# Patient Record
Sex: FEMALE | Race: WHITE | NOT HISPANIC OR LATINO | URBAN - METROPOLITAN AREA
[De-identification: names, ages, dates, MRNs, and addresses within clinical notes are randomized per-mention and may not be internally consistent; named-entity substitution may affect disease eponyms.]

---

## 2019-01-01 ENCOUNTER — EMERGENCY (EMERGENCY)
Facility: HOSPITAL | Age: 74
LOS: 1 days | Discharge: ANOTHER HOSPITAL | End: 2019-01-01
Attending: EMERGENCY MEDICINE | Admitting: EMERGENCY MEDICINE
Payer: MEDICARE

## 2019-01-01 ENCOUNTER — INPATIENT (INPATIENT)
Facility: HOSPITAL | Age: 74
LOS: 9 days | DRG: 85 | End: 2019-10-06
Attending: INTERNAL MEDICINE | Admitting: INTERNAL MEDICINE
Payer: MEDICARE

## 2019-01-01 VITALS
SYSTOLIC BLOOD PRESSURE: 135 MMHG | HEIGHT: 63 IN | DIASTOLIC BLOOD PRESSURE: 91 MMHG | WEIGHT: 115.08 LBS | OXYGEN SATURATION: 95 % | HEART RATE: 102 BPM | RESPIRATION RATE: 20 BRPM

## 2019-01-01 VITALS
HEART RATE: 101 BPM | WEIGHT: 115.08 LBS | DIASTOLIC BLOOD PRESSURE: 56 MMHG | RESPIRATION RATE: 14 BRPM | OXYGEN SATURATION: 92 % | SYSTOLIC BLOOD PRESSURE: 116 MMHG | TEMPERATURE: 98 F

## 2019-01-01 VITALS
RESPIRATION RATE: 18 BRPM | HEART RATE: 103 BPM | DIASTOLIC BLOOD PRESSURE: 54 MMHG | TEMPERATURE: 98 F | OXYGEN SATURATION: 94 % | SYSTOLIC BLOOD PRESSURE: 119 MMHG

## 2019-01-01 VITALS
HEART RATE: 133 BPM | TEMPERATURE: 102 F | SYSTOLIC BLOOD PRESSURE: 79 MMHG | OXYGEN SATURATION: 85 % | RESPIRATION RATE: 26 BRPM | DIASTOLIC BLOOD PRESSURE: 45 MMHG

## 2019-01-01 DIAGNOSIS — S06.5X9A TRAUMATIC SUBDURAL HEMORRHAGE WITH LOSS OF CONSCIOUSNESS OF UNSPECIFIED DURATION, INITIAL ENCOUNTER: ICD-10-CM

## 2019-01-01 DIAGNOSIS — Z71.89 OTHER SPECIFIED COUNSELING: ICD-10-CM

## 2019-01-01 DIAGNOSIS — J96.01 ACUTE RESPIRATORY FAILURE WITH HYPOXIA: ICD-10-CM

## 2019-01-01 DIAGNOSIS — E88.3 TUMOR LYSIS SYNDROME: ICD-10-CM

## 2019-01-01 DIAGNOSIS — R55 SYNCOPE AND COLLAPSE: ICD-10-CM

## 2019-01-01 DIAGNOSIS — Z51.5 ENCOUNTER FOR PALLIATIVE CARE: ICD-10-CM

## 2019-01-01 DIAGNOSIS — A41.9 SEPSIS, UNSPECIFIED ORGANISM: ICD-10-CM

## 2019-01-01 DIAGNOSIS — N17.9 ACUTE KIDNEY FAILURE, UNSPECIFIED: ICD-10-CM

## 2019-01-01 DIAGNOSIS — R06.00 DYSPNEA, UNSPECIFIED: ICD-10-CM

## 2019-01-01 DIAGNOSIS — R45.1 RESTLESSNESS AND AGITATION: ICD-10-CM

## 2019-01-01 DIAGNOSIS — E87.2 ACIDOSIS: ICD-10-CM

## 2019-01-01 DIAGNOSIS — E83.51 HYPOCALCEMIA: ICD-10-CM

## 2019-01-01 DIAGNOSIS — E87.8 OTHER DISORDERS OF ELECTROLYTE AND FLUID BALANCE, NOT ELSEWHERE CLASSIFIED: ICD-10-CM

## 2019-01-01 DIAGNOSIS — Z29.9 ENCOUNTER FOR PROPHYLACTIC MEASURES, UNSPECIFIED: ICD-10-CM

## 2019-01-01 DIAGNOSIS — D46.9 MYELODYSPLASTIC SYNDROME, UNSPECIFIED: ICD-10-CM

## 2019-01-01 DIAGNOSIS — D72.829 ELEVATED WHITE BLOOD CELL COUNT, UNSPECIFIED: ICD-10-CM

## 2019-01-01 DIAGNOSIS — E83.39 OTHER DISORDERS OF PHOSPHORUS METABOLISM: ICD-10-CM

## 2019-01-01 LAB
-  AMIKACIN: SIGNIFICANT CHANGE UP
-  AMPICILLIN/SULBACTAM: SIGNIFICANT CHANGE UP
-  AMPICILLIN: SIGNIFICANT CHANGE UP
-  AZTREONAM: SIGNIFICANT CHANGE UP
-  CEFAZOLIN: SIGNIFICANT CHANGE UP
-  CEFEPIME: SIGNIFICANT CHANGE UP
-  CEFOXITIN: SIGNIFICANT CHANGE UP
-  CEFTRIAXONE: SIGNIFICANT CHANGE UP
-  CIPROFLOXACIN: SIGNIFICANT CHANGE UP
-  GENTAMICIN: SIGNIFICANT CHANGE UP
-  IMIPENEM: SIGNIFICANT CHANGE UP
-  LEVOFLOXACIN: SIGNIFICANT CHANGE UP
-  MEROPENEM: SIGNIFICANT CHANGE UP
-  NITROFURANTOIN: SIGNIFICANT CHANGE UP
-  PIPERACILLIN/TAZOBACTAM: SIGNIFICANT CHANGE UP
-  TIGECYCLINE: SIGNIFICANT CHANGE UP
-  TOBRAMYCIN: SIGNIFICANT CHANGE UP
-  TRIMETHOPRIM/SULFAMETHOXAZOLE: SIGNIFICANT CHANGE UP
ALBUMIN SERPL ELPH-MCNC: 2.9 G/DL — LOW (ref 3.3–5)
ALBUMIN SERPL ELPH-MCNC: 3 G/DL — LOW (ref 3.3–5)
ALBUMIN SERPL ELPH-MCNC: 3.1 G/DL — LOW (ref 3.3–5)
ALBUMIN SERPL ELPH-MCNC: 3.1 G/DL — LOW (ref 3.3–5)
ALBUMIN SERPL ELPH-MCNC: 3.2 G/DL — LOW (ref 3.3–5)
ALBUMIN SERPL ELPH-MCNC: 3.3 G/DL — SIGNIFICANT CHANGE UP (ref 3.3–5)
ALBUMIN SERPL ELPH-MCNC: 3.3 G/DL — SIGNIFICANT CHANGE UP (ref 3.3–5)
ALBUMIN SERPL ELPH-MCNC: 3.4 G/DL — SIGNIFICANT CHANGE UP (ref 3.3–5)
ALBUMIN SERPL ELPH-MCNC: 3.4 G/DL — SIGNIFICANT CHANGE UP (ref 3.3–5)
ALBUMIN SERPL ELPH-MCNC: 3.5 G/DL — SIGNIFICANT CHANGE UP (ref 3.3–5)
ALBUMIN SERPL ELPH-MCNC: 3.6 G/DL — SIGNIFICANT CHANGE UP (ref 3.3–5)
ALBUMIN SERPL ELPH-MCNC: 3.7 G/DL — SIGNIFICANT CHANGE UP (ref 3.3–5)
ALBUMIN SERPL ELPH-MCNC: 3.9 G/DL — SIGNIFICANT CHANGE UP (ref 3.3–5)
ALBUMIN SERPL ELPH-MCNC: 4 G/DL — SIGNIFICANT CHANGE UP (ref 3.3–5)
ALBUMIN SERPL ELPH-MCNC: 4.2 G/DL — SIGNIFICANT CHANGE UP (ref 3.3–5)
ALBUMIN SERPL ELPH-MCNC: 4.4 G/DL — SIGNIFICANT CHANGE UP (ref 3.3–5)
ALP SERPL-CCNC: 101 U/L — SIGNIFICANT CHANGE UP (ref 40–120)
ALP SERPL-CCNC: 106 U/L — SIGNIFICANT CHANGE UP (ref 40–120)
ALP SERPL-CCNC: 109 U/L — SIGNIFICANT CHANGE UP (ref 40–120)
ALP SERPL-CCNC: 111 U/L — SIGNIFICANT CHANGE UP (ref 40–120)
ALP SERPL-CCNC: 111 U/L — SIGNIFICANT CHANGE UP (ref 40–120)
ALP SERPL-CCNC: 114 U/L — SIGNIFICANT CHANGE UP (ref 40–120)
ALP SERPL-CCNC: 120 U/L — SIGNIFICANT CHANGE UP (ref 40–120)
ALP SERPL-CCNC: 123 U/L — HIGH (ref 40–120)
ALP SERPL-CCNC: 126 U/L — HIGH (ref 40–120)
ALP SERPL-CCNC: 130 U/L — HIGH (ref 40–120)
ALP SERPL-CCNC: 155 U/L — HIGH (ref 40–120)
ALP SERPL-CCNC: 157 U/L — HIGH (ref 40–120)
ALP SERPL-CCNC: 82 U/L — SIGNIFICANT CHANGE UP (ref 40–120)
ALP SERPL-CCNC: 83 U/L — SIGNIFICANT CHANGE UP (ref 40–120)
ALP SERPL-CCNC: 84 U/L — SIGNIFICANT CHANGE UP (ref 40–120)
ALP SERPL-CCNC: 88 U/L — SIGNIFICANT CHANGE UP (ref 40–120)
ALP SERPL-CCNC: 89 U/L — SIGNIFICANT CHANGE UP (ref 40–120)
ALP SERPL-CCNC: 90 U/L — SIGNIFICANT CHANGE UP (ref 40–120)
ALP SERPL-CCNC: 90 U/L — SIGNIFICANT CHANGE UP (ref 40–120)
ALP SERPL-CCNC: 92 U/L — SIGNIFICANT CHANGE UP (ref 40–120)
ALP SERPL-CCNC: 94 U/L — SIGNIFICANT CHANGE UP (ref 40–120)
ALP SERPL-CCNC: 94 U/L — SIGNIFICANT CHANGE UP (ref 40–120)
ALP SERPL-CCNC: 97 U/L — SIGNIFICANT CHANGE UP (ref 40–120)
ALP SERPL-CCNC: 97 U/L — SIGNIFICANT CHANGE UP (ref 40–120)
ALT FLD-CCNC: 11 U/L — SIGNIFICANT CHANGE UP (ref 10–45)
ALT FLD-CCNC: 13 U/L — SIGNIFICANT CHANGE UP (ref 10–45)
ALT FLD-CCNC: 13 U/L — SIGNIFICANT CHANGE UP (ref 10–45)
ALT FLD-CCNC: 138 U/L — HIGH (ref 10–45)
ALT FLD-CCNC: 147 U/L — HIGH (ref 10–45)
ALT FLD-CCNC: 147 U/L — HIGH (ref 10–45)
ALT FLD-CCNC: 154 U/L — HIGH (ref 10–45)
ALT FLD-CCNC: 156 U/L — HIGH (ref 10–45)
ALT FLD-CCNC: 158 U/L — HIGH (ref 10–45)
ALT FLD-CCNC: 158 U/L — HIGH (ref 10–45)
ALT FLD-CCNC: 159 U/L — HIGH (ref 10–45)
ALT FLD-CCNC: 161 U/L — HIGH (ref 10–45)
ALT FLD-CCNC: 161 U/L — HIGH (ref 10–45)
ALT FLD-CCNC: 166 U/L — HIGH (ref 10–45)
ALT FLD-CCNC: 17 U/L — SIGNIFICANT CHANGE UP (ref 12–78)
ALT FLD-CCNC: 173 U/L — HIGH (ref 10–45)
ALT FLD-CCNC: 18 U/L — SIGNIFICANT CHANGE UP (ref 10–45)
ALT FLD-CCNC: 18 U/L — SIGNIFICANT CHANGE UP (ref 10–45)
ALT FLD-CCNC: 21 U/L — SIGNIFICANT CHANGE UP (ref 10–45)
ALT FLD-CCNC: 216 U/L — HIGH (ref 10–45)
ALT FLD-CCNC: 226 U/L — HIGH (ref 10–45)
ALT FLD-CCNC: 237 U/L — HIGH (ref 10–45)
ALT FLD-CCNC: 40 U/L — SIGNIFICANT CHANGE UP (ref 10–45)
ALT FLD-CCNC: 92 U/L — HIGH (ref 10–45)
ANION GAP SERPL CALC-SCNC: 12 MMOL/L — SIGNIFICANT CHANGE UP (ref 5–17)
ANION GAP SERPL CALC-SCNC: 13 MMOL/L — SIGNIFICANT CHANGE UP (ref 5–17)
ANION GAP SERPL CALC-SCNC: 14 MMOL/L — SIGNIFICANT CHANGE UP (ref 5–17)
ANION GAP SERPL CALC-SCNC: 15 MMOL/L — SIGNIFICANT CHANGE UP (ref 5–17)
ANION GAP SERPL CALC-SCNC: 16 MMOL/L — SIGNIFICANT CHANGE UP (ref 5–17)
ANION GAP SERPL CALC-SCNC: 17 MMOL/L — SIGNIFICANT CHANGE UP (ref 5–17)
ANION GAP SERPL CALC-SCNC: 18 MMOL/L — HIGH (ref 5–17)
ANION GAP SERPL CALC-SCNC: 19 MMOL/L — HIGH (ref 5–17)
ANION GAP SERPL CALC-SCNC: 21 MMOL/L — HIGH (ref 5–17)
ANION GAP SERPL CALC-SCNC: 28 MMOL/L — HIGH (ref 5–17)
ANION GAP SERPL CALC-SCNC: 30 MMOL/L — HIGH (ref 5–17)
ANION GAP SERPL CALC-SCNC: 30 MMOL/L — HIGH (ref 5–17)
ANION GAP SERPL CALC-SCNC: 31 MMOL/L — HIGH (ref 5–17)
ANION GAP SERPL CALC-SCNC: 32 MMOL/L — HIGH (ref 5–17)
ANION GAP SERPL CALC-SCNC: 33 MMOL/L — HIGH (ref 5–17)
ANION GAP SERPL CALC-SCNC: 34 MMOL/L — HIGH (ref 5–17)
ANISOCYTOSIS BLD QL: SLIGHT — SIGNIFICANT CHANGE UP
ANISOCYTOSIS BLD QL: SLIGHT — SIGNIFICANT CHANGE UP
APPEARANCE UR: ABNORMAL
APTT BLD: 23.9 SEC — LOW (ref 27.5–36.3)
APTT BLD: 24 SEC — LOW (ref 27.5–36.3)
APTT BLD: 24.3 SEC — LOW (ref 27.5–36.3)
APTT BLD: 24.4 SEC — LOW (ref 27.5–36.3)
APTT BLD: 26 SEC — LOW (ref 28.5–37)
APTT BLD: 27.3 SEC — LOW (ref 27.5–36.3)
APTT BLD: 28 SEC — SIGNIFICANT CHANGE UP (ref 27.5–36.3)
APTT BLD: 29.8 SEC — SIGNIFICANT CHANGE UP (ref 27.5–36.3)
APTT BLD: 29.9 SEC — SIGNIFICANT CHANGE UP (ref 27.5–36.3)
APTT BLD: 32 SEC — SIGNIFICANT CHANGE UP (ref 27.5–36.3)
APTT BLD: 37.3 SEC — HIGH (ref 27.5–36.3)
AST SERPL-CCNC: 110 U/L — HIGH (ref 15–37)
AST SERPL-CCNC: 113 U/L — HIGH (ref 10–40)
AST SERPL-CCNC: 163 U/L — HIGH (ref 10–40)
AST SERPL-CCNC: 168 U/L — HIGH (ref 10–40)
AST SERPL-CCNC: 176 U/L — HIGH (ref 10–40)
AST SERPL-CCNC: 186 U/L — HIGH (ref 10–40)
AST SERPL-CCNC: 209 U/L — HIGH (ref 10–40)
AST SERPL-CCNC: 245 U/L — HIGH (ref 10–40)
AST SERPL-CCNC: 257 U/L — HIGH (ref 10–40)
AST SERPL-CCNC: 267 U/L — HIGH (ref 10–40)
AST SERPL-CCNC: 279 U/L — HIGH (ref 10–40)
AST SERPL-CCNC: 295 U/L — HIGH (ref 10–40)
AST SERPL-CCNC: 301 U/L — HIGH (ref 10–40)
AST SERPL-CCNC: 305 U/L — HIGH (ref 10–40)
AST SERPL-CCNC: 326 U/L — HIGH (ref 10–40)
AST SERPL-CCNC: 337 U/L — HIGH (ref 10–40)
AST SERPL-CCNC: 339 U/L — HIGH (ref 10–40)
AST SERPL-CCNC: 366 U/L — HIGH (ref 10–40)
AST SERPL-CCNC: 373 U/L — HIGH (ref 10–40)
AST SERPL-CCNC: 373 U/L — HIGH (ref 10–40)
AST SERPL-CCNC: 390 U/L — HIGH (ref 10–40)
AST SERPL-CCNC: 433 U/L — HIGH (ref 10–40)
AST SERPL-CCNC: 75 U/L — HIGH (ref 10–40)
AST SERPL-CCNC: 87 U/L — HIGH (ref 10–40)
BASE EXCESS BLDA CALC-SCNC: -0.5 MMOL/L — SIGNIFICANT CHANGE UP (ref -2–2)
BASE EXCESS BLDA CALC-SCNC: -3.6 MMOL/L — LOW (ref -2–2)
BASE EXCESS BLDV CALC-SCNC: -0.5 MMOL/L — SIGNIFICANT CHANGE UP (ref -2–2)
BASE EXCESS BLDV CALC-SCNC: -0.8 MMOL/L — SIGNIFICANT CHANGE UP (ref -2–2)
BASE EXCESS BLDV CALC-SCNC: -12 MMOL/L — LOW (ref -2–2)
BASE EXCESS BLDV CALC-SCNC: -12.6 MMOL/L — LOW (ref -2–2)
BASE EXCESS BLDV CALC-SCNC: -15 MMOL/L — LOW (ref -2–2)
BASE EXCESS BLDV CALC-SCNC: -17 MMOL/L — LOW (ref -2–2)
BASE EXCESS BLDV CALC-SCNC: -3.5 MMOL/L — LOW (ref -2–2)
BASE EXCESS BLDV CALC-SCNC: 0 MMOL/L — SIGNIFICANT CHANGE UP (ref -2–2)
BASOPHILS # BLD AUTO: 0 K/UL — SIGNIFICANT CHANGE UP (ref 0–0.2)
BASOPHILS # BLD AUTO: 0.2 K/UL — SIGNIFICANT CHANGE UP (ref 0–0.2)
BASOPHILS NFR BLD AUTO: 0 % — SIGNIFICANT CHANGE UP (ref 0–2)
BASOPHILS NFR BLD AUTO: 0.1 % — SIGNIFICANT CHANGE UP (ref 0–2)
BCR/ABL BY RT - PCR QUANTITATIVE: SIGNIFICANT CHANGE UP
BILIRUB SERPL-MCNC: 0.3 MG/DL — SIGNIFICANT CHANGE UP (ref 0.2–1.2)
BILIRUB SERPL-MCNC: 0.4 MG/DL — SIGNIFICANT CHANGE UP (ref 0.2–1.2)
BILIRUB SERPL-MCNC: 0.5 MG/DL — SIGNIFICANT CHANGE UP (ref 0.2–1.2)
BILIRUB SERPL-MCNC: 0.6 MG/DL — SIGNIFICANT CHANGE UP (ref 0.2–1.2)
BILIRUB SERPL-MCNC: 0.7 MG/DL — SIGNIFICANT CHANGE UP (ref 0.2–1.2)
BILIRUB SERPL-MCNC: 0.7 MG/DL — SIGNIFICANT CHANGE UP (ref 0.2–1.2)
BILIRUB SERPL-MCNC: 0.8 MG/DL — SIGNIFICANT CHANGE UP (ref 0.2–1.2)
BILIRUB SERPL-MCNC: 0.9 MG/DL — SIGNIFICANT CHANGE UP (ref 0.2–1.2)
BILIRUB SERPL-MCNC: 1 MG/DL — SIGNIFICANT CHANGE UP (ref 0.2–1.2)
BILIRUB SERPL-MCNC: 1.1 MG/DL — SIGNIFICANT CHANGE UP (ref 0.2–1.2)
BILIRUB SERPL-MCNC: 1.2 MG/DL — SIGNIFICANT CHANGE UP (ref 0.2–1.2)
BILIRUB UR-MCNC: NEGATIVE — SIGNIFICANT CHANGE UP
BLASTS # FLD: 8 % — HIGH (ref 0–0)
BLD GP AB SCN SERPL QL: NEGATIVE — SIGNIFICANT CHANGE UP
BLD GP AB SCN SERPL QL: NEGATIVE — SIGNIFICANT CHANGE UP
BUN SERPL-MCNC: 10 MG/DL — SIGNIFICANT CHANGE UP (ref 7–23)
BUN SERPL-MCNC: 100 MG/DL — HIGH (ref 7–23)
BUN SERPL-MCNC: 11 MG/DL — SIGNIFICANT CHANGE UP (ref 7–23)
BUN SERPL-MCNC: 16 MG/DL — SIGNIFICANT CHANGE UP (ref 7–23)
BUN SERPL-MCNC: 17 MG/DL — SIGNIFICANT CHANGE UP (ref 7–23)
BUN SERPL-MCNC: 24 MG/DL — HIGH (ref 7–23)
BUN SERPL-MCNC: 28 MG/DL — HIGH (ref 7–23)
BUN SERPL-MCNC: 4 MG/DL — LOW (ref 7–23)
BUN SERPL-MCNC: 46 MG/DL — HIGH (ref 7–23)
BUN SERPL-MCNC: 5 MG/DL — LOW (ref 7–23)
BUN SERPL-MCNC: 53 MG/DL — HIGH (ref 7–23)
BUN SERPL-MCNC: 54 MG/DL — HIGH (ref 7–23)
BUN SERPL-MCNC: 55 MG/DL — HIGH (ref 7–23)
BUN SERPL-MCNC: 6 MG/DL — LOW (ref 7–23)
BUN SERPL-MCNC: 7 MG/DL — SIGNIFICANT CHANGE UP (ref 7–23)
BUN SERPL-MCNC: 8 MG/DL — SIGNIFICANT CHANGE UP (ref 7–23)
BUN SERPL-MCNC: 9 MG/DL — SIGNIFICANT CHANGE UP (ref 7–23)
BUN SERPL-MCNC: 91 MG/DL — HIGH (ref 7–23)
BUN SERPL-MCNC: 95 MG/DL — HIGH (ref 7–23)
BUN SERPL-MCNC: 98 MG/DL — HIGH (ref 7–23)
CA-I BLDA-SCNC: 1.09 MMOL/L — LOW (ref 1.12–1.3)
CA-I SERPL-SCNC: 0.86 MMOL/L — LOW (ref 1.12–1.3)
CA-I SERPL-SCNC: 0.98 MMOL/L — LOW (ref 1.12–1.3)
CA-I SERPL-SCNC: 0.98 MMOL/L — LOW (ref 1.12–1.3)
CA-I SERPL-SCNC: 1.03 MMOL/L — LOW (ref 1.12–1.3)
CA-I SERPL-SCNC: 1.03 MMOL/L — LOW (ref 1.12–1.3)
CA-I SERPL-SCNC: 1.06 MMOL/L — LOW (ref 1.12–1.3)
CA-I SERPL-SCNC: 1.06 MMOL/L — LOW (ref 1.12–1.3)
CA-I SERPL-SCNC: 1.08 MMOL/L — LOW (ref 1.12–1.3)
CALCIUM SERPL-MCNC: 10.1 MG/DL — SIGNIFICANT CHANGE UP (ref 8.4–10.5)
CALCIUM SERPL-MCNC: 10.3 MG/DL — SIGNIFICANT CHANGE UP (ref 8.4–10.5)
CALCIUM SERPL-MCNC: 6 MG/DL — CRITICAL LOW (ref 8.5–10.1)
CALCIUM SERPL-MCNC: 6.2 MG/DL — CRITICAL LOW (ref 8.4–10.5)
CALCIUM SERPL-MCNC: 6.6 MG/DL — LOW (ref 8.4–10.5)
CALCIUM SERPL-MCNC: 6.7 MG/DL — LOW (ref 8.4–10.5)
CALCIUM SERPL-MCNC: 7.1 MG/DL — LOW (ref 8.4–10.5)
CALCIUM SERPL-MCNC: 7.4 MG/DL — LOW (ref 8.4–10.5)
CALCIUM SERPL-MCNC: 7.4 MG/DL — LOW (ref 8.4–10.5)
CALCIUM SERPL-MCNC: 7.6 MG/DL — LOW (ref 8.4–10.5)
CALCIUM SERPL-MCNC: 7.7 MG/DL — LOW (ref 8.4–10.5)
CALCIUM SERPL-MCNC: 7.7 MG/DL — LOW (ref 8.4–10.5)
CALCIUM SERPL-MCNC: 7.8 MG/DL — LOW (ref 8.4–10.5)
CALCIUM SERPL-MCNC: 8 MG/DL — LOW (ref 8.4–10.5)
CALCIUM SERPL-MCNC: 8.1 MG/DL — LOW (ref 8.4–10.5)
CALCIUM SERPL-MCNC: 8.2 MG/DL — LOW (ref 8.4–10.5)
CALCIUM SERPL-MCNC: 8.3 MG/DL — LOW (ref 8.4–10.5)
CALCIUM SERPL-MCNC: 8.3 MG/DL — LOW (ref 8.4–10.5)
CALCIUM SERPL-MCNC: 8.4 MG/DL — SIGNIFICANT CHANGE UP (ref 8.4–10.5)
CALCIUM SERPL-MCNC: 8.5 MG/DL — SIGNIFICANT CHANGE UP (ref 8.4–10.5)
CALCIUM SERPL-MCNC: 8.5 MG/DL — SIGNIFICANT CHANGE UP (ref 8.4–10.5)
CALCIUM SERPL-MCNC: 8.6 MG/DL — SIGNIFICANT CHANGE UP (ref 8.4–10.5)
CALCIUM SERPL-MCNC: 9.6 MG/DL — SIGNIFICANT CHANGE UP (ref 8.4–10.5)
CALCIUM SERPL-MCNC: 9.7 MG/DL — SIGNIFICANT CHANGE UP (ref 8.4–10.5)
CHLORIDE BLDA-SCNC: 101 MMOL/L — SIGNIFICANT CHANGE UP (ref 96–108)
CHLORIDE BLDV-SCNC: 100 MMOL/L — SIGNIFICANT CHANGE UP (ref 96–108)
CHLORIDE BLDV-SCNC: 100 MMOL/L — SIGNIFICANT CHANGE UP (ref 96–108)
CHLORIDE BLDV-SCNC: 101 MMOL/L — SIGNIFICANT CHANGE UP (ref 96–108)
CHLORIDE BLDV-SCNC: 101 MMOL/L — SIGNIFICANT CHANGE UP (ref 96–108)
CHLORIDE BLDV-SCNC: 102 MMOL/L — SIGNIFICANT CHANGE UP (ref 96–108)
CHLORIDE BLDV-SCNC: 103 MMOL/L — SIGNIFICANT CHANGE UP (ref 96–108)
CHLORIDE BLDV-SCNC: 106 MMOL/L — SIGNIFICANT CHANGE UP (ref 96–108)
CHLORIDE BLDV-SCNC: 99 MMOL/L — SIGNIFICANT CHANGE UP (ref 96–108)
CHLORIDE SERPL-SCNC: 100 MMOL/L — SIGNIFICANT CHANGE UP (ref 96–108)
CHLORIDE SERPL-SCNC: 101 MMOL/L — SIGNIFICANT CHANGE UP (ref 96–108)
CHLORIDE SERPL-SCNC: 101 MMOL/L — SIGNIFICANT CHANGE UP (ref 96–108)
CHLORIDE SERPL-SCNC: 104 MMOL/L — SIGNIFICANT CHANGE UP (ref 96–108)
CHLORIDE SERPL-SCNC: 104 MMOL/L — SIGNIFICANT CHANGE UP (ref 96–108)
CHLORIDE SERPL-SCNC: 70 MMOL/L — LOW (ref 96–108)
CHLORIDE SERPL-SCNC: 93 MMOL/L — LOW (ref 96–108)
CHLORIDE SERPL-SCNC: 95 MMOL/L — LOW (ref 96–108)
CHLORIDE SERPL-SCNC: 95 MMOL/L — LOW (ref 96–108)
CHLORIDE SERPL-SCNC: 96 MMOL/L — SIGNIFICANT CHANGE UP (ref 96–108)
CHLORIDE SERPL-SCNC: 97 MMOL/L — SIGNIFICANT CHANGE UP (ref 96–108)
CHLORIDE SERPL-SCNC: 97 MMOL/L — SIGNIFICANT CHANGE UP (ref 96–108)
CHLORIDE SERPL-SCNC: 98 MMOL/L — SIGNIFICANT CHANGE UP (ref 96–108)
CHLORIDE SERPL-SCNC: 99 MMOL/L — SIGNIFICANT CHANGE UP (ref 96–108)
CO2 BLDA-SCNC: 22 MMOL/L — SIGNIFICANT CHANGE UP (ref 22–30)
CO2 BLDA-SCNC: 22 MMOL/L — SIGNIFICANT CHANGE UP (ref 22–30)
CO2 BLDV-SCNC: 11 MMOL/L — LOW (ref 22–30)
CO2 BLDV-SCNC: 13 MMOL/L — LOW (ref 22–30)
CO2 BLDV-SCNC: 14 MMOL/L — LOW (ref 22–30)
CO2 BLDV-SCNC: 15 MMOL/L — LOW (ref 22–30)
CO2 BLDV-SCNC: 23 MMOL/L — SIGNIFICANT CHANGE UP (ref 22–30)
CO2 BLDV-SCNC: 25 MMOL/L — SIGNIFICANT CHANGE UP (ref 22–30)
CO2 BLDV-SCNC: 26 MMOL/L — SIGNIFICANT CHANGE UP (ref 22–30)
CO2 BLDV-SCNC: 26 MMOL/L — SIGNIFICANT CHANGE UP (ref 22–30)
CO2 SERPL-SCNC: 10 MMOL/L — CRITICAL LOW (ref 22–31)
CO2 SERPL-SCNC: 11 MMOL/L — LOW (ref 22–31)
CO2 SERPL-SCNC: 12 MMOL/L — LOW (ref 22–31)
CO2 SERPL-SCNC: 16 MMOL/L — LOW (ref 22–31)
CO2 SERPL-SCNC: 17 MMOL/L — LOW (ref 22–31)
CO2 SERPL-SCNC: 17 MMOL/L — LOW (ref 22–31)
CO2 SERPL-SCNC: 18 MMOL/L — LOW (ref 22–31)
CO2 SERPL-SCNC: 18 MMOL/L — LOW (ref 22–31)
CO2 SERPL-SCNC: 19 MMOL/L — LOW (ref 22–31)
CO2 SERPL-SCNC: 20 MMOL/L — LOW (ref 22–31)
CO2 SERPL-SCNC: 21 MMOL/L — LOW (ref 22–31)
CO2 SERPL-SCNC: 22 MMOL/L — SIGNIFICANT CHANGE UP (ref 22–31)
CO2 SERPL-SCNC: 23 MMOL/L — SIGNIFICANT CHANGE UP (ref 22–31)
CO2 SERPL-SCNC: 8 MMOL/L — CRITICAL LOW (ref 22–31)
CO2 SERPL-SCNC: 9 MMOL/L — CRITICAL LOW (ref 22–31)
COLOR SPEC: YELLOW — SIGNIFICANT CHANGE UP
CREAT SERPL-MCNC: 0.41 MG/DL — LOW (ref 0.5–1.3)
CREAT SERPL-MCNC: 0.42 MG/DL — LOW (ref 0.5–1.3)
CREAT SERPL-MCNC: 0.46 MG/DL — LOW (ref 0.5–1.3)
CREAT SERPL-MCNC: 0.47 MG/DL — LOW (ref 0.5–1.3)
CREAT SERPL-MCNC: 0.5 MG/DL — SIGNIFICANT CHANGE UP (ref 0.5–1.3)
CREAT SERPL-MCNC: 0.55 MG/DL — SIGNIFICANT CHANGE UP (ref 0.5–1.3)
CREAT SERPL-MCNC: 0.56 MG/DL — SIGNIFICANT CHANGE UP (ref 0.5–1.3)
CREAT SERPL-MCNC: 0.56 MG/DL — SIGNIFICANT CHANGE UP (ref 0.5–1.3)
CREAT SERPL-MCNC: 0.61 MG/DL — SIGNIFICANT CHANGE UP (ref 0.5–1.3)
CREAT SERPL-MCNC: 0.61 MG/DL — SIGNIFICANT CHANGE UP (ref 0.5–1.3)
CREAT SERPL-MCNC: 0.69 MG/DL — SIGNIFICANT CHANGE UP (ref 0.5–1.3)
CREAT SERPL-MCNC: 0.8 MG/DL — SIGNIFICANT CHANGE UP (ref 0.5–1.3)
CREAT SERPL-MCNC: 1.06 MG/DL — SIGNIFICANT CHANGE UP (ref 0.5–1.3)
CREAT SERPL-MCNC: 1.14 MG/DL — SIGNIFICANT CHANGE UP (ref 0.5–1.3)
CREAT SERPL-MCNC: 1.33 MG/DL — HIGH (ref 0.5–1.3)
CREAT SERPL-MCNC: 1.51 MG/DL — HIGH (ref 0.5–1.3)
CREAT SERPL-MCNC: 1.61 MG/DL — HIGH (ref 0.5–1.3)
CREAT SERPL-MCNC: 1.73 MG/DL — HIGH (ref 0.5–1.3)
CREAT SERPL-MCNC: 1.91 MG/DL — HIGH (ref 0.5–1.3)
CREAT SERPL-MCNC: 2.22 MG/DL — HIGH (ref 0.5–1.3)
CREAT SERPL-MCNC: 2.7 MG/DL — HIGH (ref 0.5–1.3)
CREAT SERPL-MCNC: 2.79 MG/DL — HIGH (ref 0.5–1.3)
CREAT SERPL-MCNC: 2.8 MG/DL — HIGH (ref 0.5–1.3)
CREAT SERPL-MCNC: 3.19 MG/DL — HIGH (ref 0.5–1.3)
CULTURE RESULTS: SIGNIFICANT CHANGE UP
D DIMER BLD IA.RAPID-MCNC: 9211 NG/ML DDU — HIGH
DACRYOCYTES BLD QL SMEAR: SLIGHT — SIGNIFICANT CHANGE UP
DIFF PNL FLD: ABNORMAL
ELLIPTOCYTES BLD QL SMEAR: SLIGHT — SIGNIFICANT CHANGE UP
EOSINOPHIL # BLD AUTO: 0 K/UL — SIGNIFICANT CHANGE UP (ref 0–0.5)
EOSINOPHIL # BLD AUTO: 0 K/UL — SIGNIFICANT CHANGE UP (ref 0–0.5)
EOSINOPHIL # BLD AUTO: 0.3 K/UL — SIGNIFICANT CHANGE UP (ref 0–0.5)
EOSINOPHIL # BLD AUTO: 0.3 K/UL — SIGNIFICANT CHANGE UP (ref 0–0.5)
EOSINOPHIL NFR BLD AUTO: 0 % — SIGNIFICANT CHANGE UP (ref 0–6)
EOSINOPHIL NFR BLD AUTO: 0.1 % — SIGNIFICANT CHANGE UP (ref 0–6)
EOSINOPHIL NFR BLD AUTO: 1 % — SIGNIFICANT CHANGE UP (ref 0–6)
FIBRINOGEN PPP-MCNC: 448 MG/DL — SIGNIFICANT CHANGE UP (ref 350–510)
GAS PNL BLDA: SIGNIFICANT CHANGE UP
GAS PNL BLDV: 131 MMOL/L — LOW (ref 135–145)
GAS PNL BLDV: 132 MMOL/L — LOW (ref 135–145)
GAS PNL BLDV: 132 MMOL/L — LOW (ref 135–145)
GAS PNL BLDV: 133 MMOL/L — LOW (ref 135–145)
GAS PNL BLDV: 135 MMOL/L — SIGNIFICANT CHANGE UP (ref 135–145)
GAS PNL BLDV: 139 MMOL/L — SIGNIFICANT CHANGE UP (ref 135–145)
GAS PNL BLDV: SIGNIFICANT CHANGE UP
GLUCOSE BLDA-MCNC: 86 MG/DL — SIGNIFICANT CHANGE UP (ref 70–99)
GLUCOSE BLDC GLUCOMTR-MCNC: 113 MG/DL — HIGH (ref 70–99)
GLUCOSE BLDC GLUCOMTR-MCNC: 121 MG/DL — HIGH (ref 70–99)
GLUCOSE BLDC GLUCOMTR-MCNC: 121 MG/DL — HIGH (ref 70–99)
GLUCOSE BLDC GLUCOMTR-MCNC: 125 MG/DL — HIGH (ref 70–99)
GLUCOSE BLDC GLUCOMTR-MCNC: 128 MG/DL — HIGH (ref 70–99)
GLUCOSE BLDC GLUCOMTR-MCNC: 128 MG/DL — HIGH (ref 70–99)
GLUCOSE BLDC GLUCOMTR-MCNC: 135 MG/DL — HIGH (ref 70–99)
GLUCOSE BLDC GLUCOMTR-MCNC: 147 MG/DL — HIGH (ref 70–99)
GLUCOSE BLDC GLUCOMTR-MCNC: 164 MG/DL — HIGH (ref 70–99)
GLUCOSE BLDC GLUCOMTR-MCNC: 170 MG/DL — HIGH (ref 70–99)
GLUCOSE BLDC GLUCOMTR-MCNC: 175 MG/DL — HIGH (ref 70–99)
GLUCOSE BLDC GLUCOMTR-MCNC: 48 MG/DL — LOW (ref 70–99)
GLUCOSE BLDC GLUCOMTR-MCNC: 97 MG/DL — SIGNIFICANT CHANGE UP (ref 70–99)
GLUCOSE BLDC GLUCOMTR-MCNC: 99 MG/DL — SIGNIFICANT CHANGE UP (ref 70–99)
GLUCOSE BLDV-MCNC: 108 MG/DL — HIGH (ref 70–99)
GLUCOSE BLDV-MCNC: 124 MG/DL — HIGH (ref 70–99)
GLUCOSE BLDV-MCNC: 124 MG/DL — HIGH (ref 70–99)
GLUCOSE BLDV-MCNC: 47 MG/DL — LOW (ref 70–99)
GLUCOSE BLDV-MCNC: 71 MG/DL — SIGNIFICANT CHANGE UP (ref 70–99)
GLUCOSE BLDV-MCNC: 80 MG/DL — SIGNIFICANT CHANGE UP (ref 70–99)
GLUCOSE BLDV-MCNC: 82 MG/DL — SIGNIFICANT CHANGE UP (ref 70–99)
GLUCOSE BLDV-MCNC: 82 MG/DL — SIGNIFICANT CHANGE UP (ref 70–99)
GLUCOSE SERPL-MCNC: 1023 MG/DL — CRITICAL HIGH (ref 70–99)
GLUCOSE SERPL-MCNC: 103 MG/DL — HIGH (ref 70–99)
GLUCOSE SERPL-MCNC: 111 MG/DL — HIGH (ref 70–99)
GLUCOSE SERPL-MCNC: 119 MG/DL — HIGH (ref 70–99)
GLUCOSE SERPL-MCNC: 128 MG/DL — HIGH (ref 70–99)
GLUCOSE SERPL-MCNC: 134 MG/DL — HIGH (ref 70–99)
GLUCOSE SERPL-MCNC: 137 MG/DL — HIGH (ref 70–99)
GLUCOSE SERPL-MCNC: 139 MG/DL — HIGH (ref 70–99)
GLUCOSE SERPL-MCNC: 139 MG/DL — HIGH (ref 70–99)
GLUCOSE SERPL-MCNC: 154 MG/DL — HIGH (ref 70–99)
GLUCOSE SERPL-MCNC: 47 MG/DL — LOW (ref 70–99)
GLUCOSE SERPL-MCNC: 77 MG/DL — SIGNIFICANT CHANGE UP (ref 70–99)
GLUCOSE SERPL-MCNC: 78 MG/DL — SIGNIFICANT CHANGE UP (ref 70–99)
GLUCOSE SERPL-MCNC: 79 MG/DL — SIGNIFICANT CHANGE UP (ref 70–99)
GLUCOSE SERPL-MCNC: 83 MG/DL — SIGNIFICANT CHANGE UP (ref 70–99)
GLUCOSE SERPL-MCNC: 84 MG/DL — SIGNIFICANT CHANGE UP (ref 70–99)
GLUCOSE SERPL-MCNC: 85 MG/DL — SIGNIFICANT CHANGE UP (ref 70–99)
GLUCOSE SERPL-MCNC: 88 MG/DL — SIGNIFICANT CHANGE UP (ref 70–99)
GLUCOSE SERPL-MCNC: 88 MG/DL — SIGNIFICANT CHANGE UP (ref 70–99)
GLUCOSE SERPL-MCNC: 91 MG/DL — SIGNIFICANT CHANGE UP (ref 70–99)
GLUCOSE SERPL-MCNC: 97 MG/DL — SIGNIFICANT CHANGE UP (ref 70–99)
GLUCOSE SERPL-MCNC: 98 MG/DL — SIGNIFICANT CHANGE UP (ref 70–99)
GLUCOSE UR QL: NEGATIVE — SIGNIFICANT CHANGE UP
HAPTOGLOB SERPL-MCNC: 92 MG/DL — SIGNIFICANT CHANGE UP (ref 34–200)
HBV SURFACE AB SER-ACNC: SIGNIFICANT CHANGE UP
HBV SURFACE AG SER-ACNC: SIGNIFICANT CHANGE UP
HCO3 BLDA-SCNC: 21 MMOL/L — SIGNIFICANT CHANGE UP (ref 21–29)
HCO3 BLDA-SCNC: 21 MMOL/L — SIGNIFICANT CHANGE UP (ref 21–29)
HCO3 BLDV-SCNC: 10 MMOL/L — LOW (ref 21–29)
HCO3 BLDV-SCNC: 12 MMOL/L — LOW (ref 21–29)
HCO3 BLDV-SCNC: 13 MMOL/L — LOW (ref 21–29)
HCO3 BLDV-SCNC: 14 MMOL/L — LOW (ref 21–29)
HCO3 BLDV-SCNC: 22 MMOL/L — SIGNIFICANT CHANGE UP (ref 21–29)
HCO3 BLDV-SCNC: 24 MMOL/L — SIGNIFICANT CHANGE UP (ref 21–29)
HCO3 BLDV-SCNC: 25 MMOL/L — SIGNIFICANT CHANGE UP (ref 21–29)
HCO3 BLDV-SCNC: 25 MMOL/L — SIGNIFICANT CHANGE UP (ref 21–29)
HCT VFR BLD CALC: 20 % — CRITICAL LOW (ref 34.5–45)
HCT VFR BLD CALC: 20 % — CRITICAL LOW (ref 34.5–45)
HCT VFR BLD CALC: 20.8 % — CRITICAL LOW (ref 34.5–45)
HCT VFR BLD CALC: 20.9 % — CRITICAL LOW (ref 34.5–45)
HCT VFR BLD CALC: 21 % — CRITICAL LOW (ref 34.5–45)
HCT VFR BLD CALC: 21.4 % — LOW (ref 34.5–45)
HCT VFR BLD CALC: 22 % — LOW (ref 34.5–45)
HCT VFR BLD CALC: 22.2 % — LOW (ref 34.5–45)
HCT VFR BLD CALC: 22.3 % — LOW (ref 34.5–45)
HCT VFR BLD CALC: 22.3 % — LOW (ref 34.5–45)
HCT VFR BLD CALC: 22.4 % — LOW (ref 34.5–45)
HCT VFR BLD CALC: 23.2 % — LOW (ref 34.5–45)
HCT VFR BLD CALC: 23.4 % — LOW (ref 34.5–45)
HCT VFR BLD CALC: 23.5 % — LOW (ref 34.5–45)
HCT VFR BLD CALC: 23.8 % — LOW (ref 34.5–45)
HCT VFR BLD CALC: 24.1 % — LOW (ref 34.5–45)
HCT VFR BLD CALC: 24.5 % — LOW (ref 34.5–45)
HCT VFR BLD CALC: 24.8 % — LOW (ref 34.5–45)
HCT VFR BLD CALC: 25.5 % — LOW (ref 34.5–45)
HCT VFR BLDA CALC: 19 % — CRITICAL LOW (ref 39–50)
HCT VFR BLDA CALC: 20 % — CRITICAL LOW (ref 39–50)
HCT VFR BLDA CALC: 22 % — CRITICAL LOW (ref 39–50)
HCT VFR BLDA CALC: 23 % — LOW (ref 39–50)
HCT VFR BLDA CALC: 24 % — LOW (ref 39–50)
HCV AB S/CO SERPL IA: 0.18 S/CO — SIGNIFICANT CHANGE UP (ref 0–0.99)
HCV AB SERPL-IMP: SIGNIFICANT CHANGE UP
HEMATOPATHOLOGY REPORT: SIGNIFICANT CHANGE UP
HGB BLD CALC-MCNC: 6.1 G/DL — CRITICAL LOW (ref 11.5–15.5)
HGB BLD CALC-MCNC: 6.3 G/DL — CRITICAL LOW (ref 11.5–15.5)
HGB BLD CALC-MCNC: 6.4 G/DL — CRITICAL LOW (ref 11.5–15.5)
HGB BLD CALC-MCNC: 6.5 G/DL — CRITICAL LOW (ref 11.5–15.5)
HGB BLD CALC-MCNC: 6.9 G/DL — CRITICAL LOW (ref 11.5–15.5)
HGB BLD CALC-MCNC: 7.3 G/DL — LOW (ref 11.5–15.5)
HGB BLD CALC-MCNC: 7.6 G/DL — LOW (ref 11.5–15.5)
HGB BLD CALC-MCNC: 7.7 G/DL — LOW (ref 11.5–15.5)
HGB BLD CALC-MCNC: 7.8 G/DL — LOW (ref 11.5–15.5)
HGB BLD-MCNC: 5.5 G/DL — CRITICAL LOW (ref 11.5–15.5)
HGB BLD-MCNC: 6.1 G/DL — CRITICAL LOW (ref 11.5–15.5)
HGB BLD-MCNC: 6.5 G/DL — CRITICAL LOW (ref 11.5–15.5)
HGB BLD-MCNC: 6.6 G/DL — CRITICAL LOW (ref 11.5–15.5)
HGB BLD-MCNC: 6.6 G/DL — CRITICAL LOW (ref 11.5–15.5)
HGB BLD-MCNC: 6.8 G/DL — CRITICAL LOW (ref 11.5–15.5)
HGB BLD-MCNC: 6.9 G/DL — CRITICAL LOW (ref 11.5–15.5)
HGB BLD-MCNC: 7 G/DL — CRITICAL LOW (ref 11.5–15.5)
HGB BLD-MCNC: 7.2 G/DL — LOW (ref 11.5–15.5)
HGB BLD-MCNC: 7.3 G/DL — LOW (ref 11.5–15.5)
HGB BLD-MCNC: 7.3 G/DL — LOW (ref 11.5–15.5)
HGB BLD-MCNC: 7.4 G/DL — LOW (ref 11.5–15.5)
HGB BLD-MCNC: 7.5 G/DL — LOW (ref 11.5–15.5)
HGB BLD-MCNC: 7.7 G/DL — LOW (ref 11.5–15.5)
HGB BLD-MCNC: 7.9 G/DL — LOW (ref 11.5–15.5)
HGB BLD-MCNC: 8 G/DL — LOW (ref 11.5–15.5)
HGB BLD-MCNC: 8.2 G/DL — LOW (ref 11.5–15.5)
HGB BLD-MCNC: 8.5 G/DL — LOW (ref 11.5–15.5)
HGB BLD-MCNC: 8.7 G/DL — LOW (ref 11.5–15.5)
HIV 1 & 2 AB SERPL IA.RAPID: SIGNIFICANT CHANGE UP
HOROWITZ INDEX BLDA+IHG-RTO: 40 — SIGNIFICANT CHANGE UP
HOROWITZ INDEX BLDV+IHG-RTO: 30 — SIGNIFICANT CHANGE UP
HOROWITZ INDEX BLDV+IHG-RTO: 50 — SIGNIFICANT CHANGE UP
HOROWITZ INDEX BLDV+IHG-RTO: 70 — SIGNIFICANT CHANGE UP
HOROWITZ INDEX BLDV+IHG-RTO: SIGNIFICANT CHANGE UP
HOROWITZ INDEX BLDV+IHG-RTO: SIGNIFICANT CHANGE UP
HYPOCHROMIA BLD QL: SLIGHT — SIGNIFICANT CHANGE UP
INR BLD: 1.13 RATIO — SIGNIFICANT CHANGE UP (ref 0.88–1.16)
INR BLD: 1.14 RATIO — SIGNIFICANT CHANGE UP (ref 0.88–1.16)
INR BLD: 1.14 RATIO — SIGNIFICANT CHANGE UP (ref 0.88–1.16)
INR BLD: 1.19 RATIO — HIGH (ref 0.88–1.16)
INR BLD: 1.21 RATIO — HIGH (ref 0.88–1.16)
INR BLD: 1.33 RATIO — HIGH (ref 0.88–1.16)
INR BLD: 1.59 RATIO — HIGH (ref 0.88–1.16)
INR BLD: 1.63 RATIO — HIGH (ref 0.88–1.16)
INR BLD: 1.88 RATIO — HIGH (ref 0.88–1.16)
INR BLD: 2.08 RATIO — HIGH (ref 0.88–1.16)
INR BLD: 2.72 RATIO — HIGH (ref 0.88–1.16)
INTUBATED: SIGNIFICANT CHANGE UP
INTUBATED: YES — SIGNIFICANT CHANGE UP
JAK2 P.V617F BLD/T QL: SIGNIFICANT CHANGE UP
KETONES UR-MCNC: SIGNIFICANT CHANGE UP
LACTATE BLDA-MCNC: 4.2 MMOL/L — CRITICAL HIGH (ref 0.7–2)
LACTATE BLDV-MCNC: 1.6 MMOL/L — SIGNIFICANT CHANGE UP (ref 0.7–2)
LACTATE BLDV-MCNC: 11.3 MMOL/L — CRITICAL HIGH (ref 0.7–2)
LACTATE BLDV-MCNC: 11.9 MMOL/L — CRITICAL HIGH (ref 0.7–2)
LACTATE BLDV-MCNC: 12.2 MMOL/L — CRITICAL HIGH (ref 0.7–2)
LACTATE BLDV-MCNC: 2.7 MMOL/L — HIGH (ref 0.7–2)
LACTATE BLDV-MCNC: 2.8 MMOL/L — HIGH (ref 0.7–2)
LACTATE BLDV-MCNC: 3.9 MMOL/L — HIGH (ref 0.7–2)
LACTATE BLDV-MCNC: 4.9 MMOL/L — CRITICAL HIGH (ref 0.7–2)
LDH SERPL L TO P-CCNC: 5945 U/L — HIGH (ref 50–242)
LDH SERPL L TO P-CCNC: 6801 U/L — HIGH (ref 50–242)
LDH SERPL L TO P-CCNC: 7639 U/L — HIGH (ref 50–242)
LDH SERPL L TO P-CCNC: >2250 U/L — HIGH (ref 50–242)
LEGIONELLA AG UR QL: NEGATIVE — SIGNIFICANT CHANGE UP
LEUKOCYTE ESTERASE UR-ACNC: ABNORMAL
LYMPHOCYTES # BLD AUTO: 1.4 K/UL — SIGNIFICANT CHANGE UP (ref 1–3.3)
LYMPHOCYTES # BLD AUTO: 1.45 K/UL — SIGNIFICANT CHANGE UP (ref 1–3.3)
LYMPHOCYTES # BLD AUTO: 16 K/UL — HIGH (ref 1–3.3)
LYMPHOCYTES # BLD AUTO: 2.2 K/UL — SIGNIFICANT CHANGE UP (ref 1–3.3)
LYMPHOCYTES # BLD AUTO: 21 % — SIGNIFICANT CHANGE UP (ref 13–44)
LYMPHOCYTES # BLD AUTO: 5 % — LOW (ref 13–44)
LYMPHOCYTES # BLD AUTO: 6.7 % — LOW (ref 13–44)
LYMPHOCYTES # BLD AUTO: 8 % — LOW (ref 13–44)
MACROCYTES BLD QL: SLIGHT — SIGNIFICANT CHANGE UP
MAGNESIUM SERPL-MCNC: 1.4 MG/DL — LOW (ref 1.6–2.6)
MAGNESIUM SERPL-MCNC: 1.7 MG/DL — SIGNIFICANT CHANGE UP (ref 1.6–2.6)
MAGNESIUM SERPL-MCNC: 1.8 MG/DL — SIGNIFICANT CHANGE UP (ref 1.6–2.6)
MAGNESIUM SERPL-MCNC: 2 MG/DL — SIGNIFICANT CHANGE UP (ref 1.6–2.6)
MAGNESIUM SERPL-MCNC: 2 MG/DL — SIGNIFICANT CHANGE UP (ref 1.6–2.6)
MAGNESIUM SERPL-MCNC: 2.1 MG/DL — SIGNIFICANT CHANGE UP (ref 1.6–2.6)
MAGNESIUM SERPL-MCNC: 2.1 MG/DL — SIGNIFICANT CHANGE UP (ref 1.6–2.6)
MAGNESIUM SERPL-MCNC: 2.2 MG/DL — SIGNIFICANT CHANGE UP (ref 1.6–2.6)
MAGNESIUM SERPL-MCNC: 2.3 MG/DL — SIGNIFICANT CHANGE UP (ref 1.6–2.6)
MAGNESIUM SERPL-MCNC: 2.4 MG/DL — SIGNIFICANT CHANGE UP (ref 1.6–2.6)
MAGNESIUM SERPL-MCNC: 2.5 MG/DL — SIGNIFICANT CHANGE UP (ref 1.6–2.6)
MANUAL SMEAR VERIFICATION: SIGNIFICANT CHANGE UP
MCHC RBC-ENTMCNC: 24.4 PG — LOW (ref 27–34)
MCHC RBC-ENTMCNC: 24.6 PG — LOW (ref 27–34)
MCHC RBC-ENTMCNC: 25.1 PG — LOW (ref 27–34)
MCHC RBC-ENTMCNC: 25.3 PG — LOW (ref 27–34)
MCHC RBC-ENTMCNC: 25.9 PG — LOW (ref 27–34)
MCHC RBC-ENTMCNC: 26 PG — LOW (ref 27–34)
MCHC RBC-ENTMCNC: 26.2 PG — LOW (ref 27–34)
MCHC RBC-ENTMCNC: 26.6 PG — LOW (ref 27–34)
MCHC RBC-ENTMCNC: 26.7 PG — LOW (ref 27–34)
MCHC RBC-ENTMCNC: 26.9 PG — LOW (ref 27–34)
MCHC RBC-ENTMCNC: 27.3 PG — SIGNIFICANT CHANGE UP (ref 27–34)
MCHC RBC-ENTMCNC: 27.5 GM/DL — LOW (ref 32–36)
MCHC RBC-ENTMCNC: 27.6 PG — SIGNIFICANT CHANGE UP (ref 27–34)
MCHC RBC-ENTMCNC: 27.7 PG — SIGNIFICANT CHANGE UP (ref 27–34)
MCHC RBC-ENTMCNC: 27.9 PG — SIGNIFICANT CHANGE UP (ref 27–34)
MCHC RBC-ENTMCNC: 27.9 PG — SIGNIFICANT CHANGE UP (ref 27–34)
MCHC RBC-ENTMCNC: 28 PG — SIGNIFICANT CHANGE UP (ref 27–34)
MCHC RBC-ENTMCNC: 28 PG — SIGNIFICANT CHANGE UP (ref 27–34)
MCHC RBC-ENTMCNC: 28.1 PG — SIGNIFICANT CHANGE UP (ref 27–34)
MCHC RBC-ENTMCNC: 28.2 PG — SIGNIFICANT CHANGE UP (ref 27–34)
MCHC RBC-ENTMCNC: 29 PG — SIGNIFICANT CHANGE UP (ref 27–34)
MCHC RBC-ENTMCNC: 29.3 GM/DL — LOW (ref 32–36)
MCHC RBC-ENTMCNC: 29.3 PG — SIGNIFICANT CHANGE UP (ref 27–34)
MCHC RBC-ENTMCNC: 29.5 GM/DL — LOW (ref 32–36)
MCHC RBC-ENTMCNC: 30.9 GM/DL — LOW (ref 32–36)
MCHC RBC-ENTMCNC: 31 GM/DL — LOW (ref 32–36)
MCHC RBC-ENTMCNC: 31.4 GM/DL — LOW (ref 32–36)
MCHC RBC-ENTMCNC: 31.4 GM/DL — LOW (ref 32–36)
MCHC RBC-ENTMCNC: 31.5 GM/DL — LOW (ref 32–36)
MCHC RBC-ENTMCNC: 31.6 GM/DL — LOW (ref 32–36)
MCHC RBC-ENTMCNC: 32.7 GM/DL — SIGNIFICANT CHANGE UP (ref 32–36)
MCHC RBC-ENTMCNC: 32.8 GM/DL — SIGNIFICANT CHANGE UP (ref 32–36)
MCHC RBC-ENTMCNC: 33.2 GM/DL — SIGNIFICANT CHANGE UP (ref 32–36)
MCHC RBC-ENTMCNC: 33.3 GM/DL — SIGNIFICANT CHANGE UP (ref 32–36)
MCHC RBC-ENTMCNC: 33.5 GM/DL — SIGNIFICANT CHANGE UP (ref 32–36)
MCHC RBC-ENTMCNC: 33.9 GM/DL — SIGNIFICANT CHANGE UP (ref 32–36)
MCHC RBC-ENTMCNC: 34 GM/DL — SIGNIFICANT CHANGE UP (ref 32–36)
MCHC RBC-ENTMCNC: 34.1 GM/DL — SIGNIFICANT CHANGE UP (ref 32–36)
MCHC RBC-ENTMCNC: 34.2 GM/DL — SIGNIFICANT CHANGE UP (ref 32–36)
MCHC RBC-ENTMCNC: 34.3 GM/DL — SIGNIFICANT CHANGE UP (ref 32–36)
MCV RBC AUTO: 81.1 FL — SIGNIFICANT CHANGE UP (ref 80–100)
MCV RBC AUTO: 82.1 FL — SIGNIFICANT CHANGE UP (ref 80–100)
MCV RBC AUTO: 82.4 FL — SIGNIFICANT CHANGE UP (ref 80–100)
MCV RBC AUTO: 82.5 FL — SIGNIFICANT CHANGE UP (ref 80–100)
MCV RBC AUTO: 82.7 FL — SIGNIFICANT CHANGE UP (ref 80–100)
MCV RBC AUTO: 83.2 FL — SIGNIFICANT CHANGE UP (ref 80–100)
MCV RBC AUTO: 83.4 FL — SIGNIFICANT CHANGE UP (ref 80–100)
MCV RBC AUTO: 83.6 FL — SIGNIFICANT CHANGE UP (ref 80–100)
MCV RBC AUTO: 83.7 FL — SIGNIFICANT CHANGE UP (ref 80–100)
MCV RBC AUTO: 83.9 FL — SIGNIFICANT CHANGE UP (ref 80–100)
MCV RBC AUTO: 84.3 FL — SIGNIFICANT CHANGE UP (ref 80–100)
MCV RBC AUTO: 84.3 FL — SIGNIFICANT CHANGE UP (ref 80–100)
MCV RBC AUTO: 85.3 FL — SIGNIFICANT CHANGE UP (ref 80–100)
MCV RBC AUTO: 85.4 FL — SIGNIFICANT CHANGE UP (ref 80–100)
MCV RBC AUTO: 85.5 FL — SIGNIFICANT CHANGE UP (ref 80–100)
MCV RBC AUTO: 85.5 FL — SIGNIFICANT CHANGE UP (ref 80–100)
MCV RBC AUTO: 85.8 FL — SIGNIFICANT CHANGE UP (ref 80–100)
MCV RBC AUTO: 85.9 FL — SIGNIFICANT CHANGE UP (ref 80–100)
MCV RBC AUTO: 86.1 FL — SIGNIFICANT CHANGE UP (ref 80–100)
MCV RBC AUTO: 89.6 FL — SIGNIFICANT CHANGE UP (ref 80–100)
MCV RBC AUTO: 94.5 FL — SIGNIFICANT CHANGE UP (ref 80–100)
METAMYELOCYTES # FLD: 2 % — HIGH (ref 0–0)
METAMYELOCYTES # FLD: 2 % — HIGH (ref 0–0)
METHOD TYPE: SIGNIFICANT CHANGE UP
MICROCYTES BLD QL: SLIGHT — SIGNIFICANT CHANGE UP
MICROCYTES BLD QL: SLIGHT — SIGNIFICANT CHANGE UP
MONOCYTES # BLD AUTO: 4.2 K/UL — HIGH (ref 0–0.9)
MONOCYTES # BLD AUTO: 41.9 K/UL — HIGH (ref 0–0.9)
MONOCYTES # BLD AUTO: 7.96 K/UL — HIGH (ref 0–0.9)
MONOCYTES # BLD AUTO: SIGNIFICANT CHANGE UP (ref 0–0.9)
MONOCYTES NFR BLD AUTO: 13 % — SIGNIFICANT CHANGE UP (ref 2–14)
MONOCYTES NFR BLD AUTO: 20 % — HIGH (ref 2–14)
MONOCYTES NFR BLD AUTO: 24 % — HIGH (ref 2–14)
MONOCYTES NFR BLD AUTO: 36.7 % — HIGH (ref 2–14)
MYELOCYTES NFR BLD: 1.6 % — HIGH (ref 0–0)
MYELOCYTES NFR BLD: 12 % — HIGH (ref 0–0)
NEUTROPHILS # BLD AUTO: 10.48 K/UL — HIGH (ref 1.8–7.4)
NEUTROPHILS # BLD AUTO: 11.6 K/UL — HIGH (ref 1.8–7.4)
NEUTROPHILS # BLD AUTO: 180 K/UL — HIGH (ref 1.8–7.4)
NEUTROPHILS # BLD AUTO: SIGNIFICANT CHANGE UP (ref 1.8–7.4)
NEUTROPHILS NFR BLD AUTO: 31 % — LOW (ref 43–77)
NEUTROPHILS NFR BLD AUTO: 44 % — SIGNIFICANT CHANGE UP (ref 43–77)
NEUTROPHILS NFR BLD AUTO: 48.3 % — SIGNIFICANT CHANGE UP (ref 43–77)
NEUTROPHILS NFR BLD AUTO: 66 % — SIGNIFICANT CHANGE UP (ref 43–77)
NEUTS BAND # BLD: 7 % — SIGNIFICANT CHANGE UP (ref 0–8)
NEUTS HYPERSEG # BLD: PRESENT — SIGNIFICANT CHANGE UP
NITRITE UR-MCNC: NEGATIVE — SIGNIFICANT CHANGE UP
NRBC # BLD: 0 /100 WBCS — SIGNIFICANT CHANGE UP (ref 0–0)
NRBC # BLD: 2 /100 — HIGH (ref 0–0)
NRBC # BLD: 3 /100 WBCS — HIGH (ref 0–0)
NRBC # BLD: 3 /100 WBCS — HIGH (ref 0–0)
NRBC # BLD: 4 /100 — HIGH (ref 0–0)
NRBC # BLD: 9 /100 — HIGH (ref 0–0)
ORGANISM # SPEC MICROSCOPIC CNT: SIGNIFICANT CHANGE UP
ORGANISM # SPEC MICROSCOPIC CNT: SIGNIFICANT CHANGE UP
OTHER CELLS CSF MANUAL: 11 ML/DL — LOW (ref 18–22)
OTHER CELLS CSF MANUAL: 3 ML/DL — LOW (ref 18–22)
OTHER CELLS CSF MANUAL: 5 ML/DL — LOW (ref 18–22)
OTHER CELLS CSF MANUAL: 6 ML/DL — LOW (ref 18–22)
OTHER CELLS CSF MANUAL: 8 ML/DL — LOW (ref 18–22)
PCO2 BLDA: 26 MMHG — LOW (ref 32–46)
PCO2 BLDA: 36 MMHG — SIGNIFICANT CHANGE UP (ref 32–46)
PCO2 BLDV: 28 MMHG — LOW (ref 35–50)
PCO2 BLDV: 29 MMHG — LOW (ref 35–50)
PCO2 BLDV: 34 MMHG — LOW (ref 35–50)
PCO2 BLDV: 35 MMHG — SIGNIFICANT CHANGE UP (ref 35–50)
PCO2 BLDV: 39 MMHG — SIGNIFICANT CHANGE UP (ref 35–50)
PCO2 BLDV: 44 MMHG — SIGNIFICANT CHANGE UP (ref 35–50)
PCO2 BLDV: 47 MMHG — SIGNIFICANT CHANGE UP (ref 35–50)
PCO2 BLDV: 50 MMHG — SIGNIFICANT CHANGE UP (ref 35–50)
PH BLDA: 7.38 — SIGNIFICANT CHANGE UP (ref 7.35–7.45)
PH BLDA: 7.52 — HIGH (ref 7.35–7.45)
PH BLDV: 7.17 — CRITICAL LOW (ref 7.35–7.45)
PH BLDV: 7.18 — CRITICAL LOW (ref 7.35–7.45)
PH BLDV: 7.22 — LOW (ref 7.35–7.45)
PH BLDV: 7.28 — LOW (ref 7.35–7.45)
PH BLDV: 7.32 — LOW (ref 7.35–7.45)
PH BLDV: 7.32 — LOW (ref 7.35–7.45)
PH BLDV: 7.34 — LOW (ref 7.35–7.45)
PH BLDV: 7.41 — SIGNIFICANT CHANGE UP (ref 7.35–7.45)
PH UR: 6.5 — SIGNIFICANT CHANGE UP (ref 5–8)
PHOSPHATE SERPL-MCNC: 1.7 MG/DL — LOW (ref 2.5–4.5)
PHOSPHATE SERPL-MCNC: 11.1 MG/DL — HIGH (ref 2.5–4.5)
PHOSPHATE SERPL-MCNC: 12 MG/DL — HIGH (ref 2.5–4.5)
PHOSPHATE SERPL-MCNC: 2.2 MG/DL — LOW (ref 2.5–4.5)
PHOSPHATE SERPL-MCNC: 2.5 MG/DL — SIGNIFICANT CHANGE UP (ref 2.5–4.5)
PHOSPHATE SERPL-MCNC: 2.5 MG/DL — SIGNIFICANT CHANGE UP (ref 2.5–4.5)
PHOSPHATE SERPL-MCNC: 2.6 MG/DL — SIGNIFICANT CHANGE UP (ref 2.5–4.5)
PHOSPHATE SERPL-MCNC: 2.7 MG/DL — SIGNIFICANT CHANGE UP (ref 2.5–4.5)
PHOSPHATE SERPL-MCNC: 2.9 MG/DL — SIGNIFICANT CHANGE UP (ref 2.5–4.5)
PHOSPHATE SERPL-MCNC: 3.3 MG/DL — SIGNIFICANT CHANGE UP (ref 2.5–4.5)
PHOSPHATE SERPL-MCNC: 3.3 MG/DL — SIGNIFICANT CHANGE UP (ref 2.5–4.5)
PHOSPHATE SERPL-MCNC: 3.5 MG/DL — SIGNIFICANT CHANGE UP (ref 2.5–4.5)
PHOSPHATE SERPL-MCNC: 5.4 MG/DL — HIGH (ref 2.5–4.5)
PHOSPHATE SERPL-MCNC: 6.3 MG/DL — HIGH (ref 2.5–4.5)
PHOSPHATE SERPL-MCNC: 7.3 MG/DL — HIGH (ref 2.5–4.5)
PHOSPHATE SERPL-MCNC: 8 MG/DL — HIGH (ref 2.5–4.5)
PLAT MORPH BLD: NORMAL — SIGNIFICANT CHANGE UP
PLATELET # BLD AUTO: 102 K/UL — LOW (ref 150–400)
PLATELET # BLD AUTO: 109 K/UL — LOW (ref 150–400)
PLATELET # BLD AUTO: 41 K/UL — LOW (ref 150–400)
PLATELET # BLD AUTO: 41 K/UL — LOW (ref 150–400)
PLATELET # BLD AUTO: 42 K/UL — LOW (ref 150–400)
PLATELET # BLD AUTO: 43 K/UL — LOW (ref 150–400)
PLATELET # BLD AUTO: 45 K/UL — LOW (ref 150–400)
PLATELET # BLD AUTO: 47 K/UL — LOW (ref 150–400)
PLATELET # BLD AUTO: 48 K/UL — LOW (ref 150–400)
PLATELET # BLD AUTO: 49 K/UL — LOW (ref 150–400)
PLATELET # BLD AUTO: 55 K/UL — LOW (ref 150–400)
PLATELET # BLD AUTO: 62 K/UL — LOW (ref 150–400)
PLATELET # BLD AUTO: 72 K/UL — LOW (ref 150–400)
PLATELET # BLD AUTO: 73 K/UL — LOW (ref 150–400)
PLATELET # BLD AUTO: 73 K/UL — LOW (ref 150–400)
PLATELET # BLD AUTO: 77 K/UL — LOW (ref 150–400)
PLATELET # BLD AUTO: 78 K/UL — LOW (ref 150–400)
PLATELET # BLD AUTO: 78 K/UL — LOW (ref 150–400)
PLATELET # BLD AUTO: 80 K/UL — LOW (ref 150–400)
PLATELET # BLD AUTO: 87 K/UL — LOW (ref 150–400)
PLATELET # BLD AUTO: 90 K/UL — LOW (ref 150–400)
PO2 BLDA: 172 MMHG — HIGH (ref 74–108)
PO2 BLDA: 86 MMHG — SIGNIFICANT CHANGE UP (ref 74–108)
PO2 BLDV: 26 MMHG — SIGNIFICANT CHANGE UP (ref 25–45)
PO2 BLDV: 30 MMHG — SIGNIFICANT CHANGE UP (ref 25–45)
PO2 BLDV: 33 MMHG — SIGNIFICANT CHANGE UP (ref 25–45)
PO2 BLDV: 34 MMHG — SIGNIFICANT CHANGE UP (ref 25–45)
PO2 BLDV: 37 MMHG — SIGNIFICANT CHANGE UP (ref 25–45)
PO2 BLDV: 38 MMHG — SIGNIFICANT CHANGE UP (ref 25–45)
PO2 BLDV: 42 MMHG — SIGNIFICANT CHANGE UP (ref 25–45)
PO2 BLDV: 50 MMHG — HIGH (ref 25–45)
POIKILOCYTOSIS BLD QL AUTO: SLIGHT — SIGNIFICANT CHANGE UP
POIKILOCYTOSIS BLD QL AUTO: SLIGHT — SIGNIFICANT CHANGE UP
POLYCHROMASIA BLD QL SMEAR: SLIGHT — SIGNIFICANT CHANGE UP
POTASSIUM BLDA-SCNC: 3 MMOL/L — LOW (ref 3.5–5.3)
POTASSIUM BLDV-SCNC: 2.9 MMOL/L — CRITICAL LOW (ref 3.5–5.3)
POTASSIUM BLDV-SCNC: 3 MMOL/L — LOW (ref 3.5–5.3)
POTASSIUM BLDV-SCNC: 3.2 MMOL/L — LOW (ref 3.5–5.3)
POTASSIUM BLDV-SCNC: 3.4 MMOL/L — LOW (ref 3.5–5.3)
POTASSIUM BLDV-SCNC: 3.7 MMOL/L — SIGNIFICANT CHANGE UP (ref 3.5–5.3)
POTASSIUM BLDV-SCNC: 4.1 MMOL/L — SIGNIFICANT CHANGE UP (ref 3.5–5.3)
POTASSIUM BLDV-SCNC: 5.2 MMOL/L — SIGNIFICANT CHANGE UP (ref 3.5–5.3)
POTASSIUM BLDV-SCNC: 6 MMOL/L — HIGH (ref 3.5–5.3)
POTASSIUM SERPL-MCNC: 2.5 MMOL/L — CRITICAL LOW (ref 3.5–5.3)
POTASSIUM SERPL-MCNC: 2.9 MMOL/L — CRITICAL LOW (ref 3.5–5.3)
POTASSIUM SERPL-MCNC: 3.1 MMOL/L — LOW (ref 3.5–5.3)
POTASSIUM SERPL-MCNC: 3.2 MMOL/L — LOW (ref 3.5–5.3)
POTASSIUM SERPL-MCNC: 3.2 MMOL/L — LOW (ref 3.5–5.3)
POTASSIUM SERPL-MCNC: 3.3 MMOL/L — LOW (ref 3.5–5.3)
POTASSIUM SERPL-MCNC: 3.4 MMOL/L — LOW (ref 3.5–5.3)
POTASSIUM SERPL-MCNC: 3.5 MMOL/L — SIGNIFICANT CHANGE UP (ref 3.5–5.3)
POTASSIUM SERPL-MCNC: 3.6 MMOL/L — SIGNIFICANT CHANGE UP (ref 3.5–5.3)
POTASSIUM SERPL-MCNC: 3.7 MMOL/L — SIGNIFICANT CHANGE UP (ref 3.5–5.3)
POTASSIUM SERPL-MCNC: 3.7 MMOL/L — SIGNIFICANT CHANGE UP (ref 3.5–5.3)
POTASSIUM SERPL-MCNC: 3.8 MMOL/L — SIGNIFICANT CHANGE UP (ref 3.5–5.3)
POTASSIUM SERPL-MCNC: 3.9 MMOL/L — SIGNIFICANT CHANGE UP (ref 3.5–5.3)
POTASSIUM SERPL-MCNC: 3.9 MMOL/L — SIGNIFICANT CHANGE UP (ref 3.5–5.3)
POTASSIUM SERPL-MCNC: 4.1 MMOL/L — SIGNIFICANT CHANGE UP (ref 3.5–5.3)
POTASSIUM SERPL-MCNC: 4.2 MMOL/L — SIGNIFICANT CHANGE UP (ref 3.5–5.3)
POTASSIUM SERPL-MCNC: 5.4 MMOL/L — HIGH (ref 3.5–5.3)
POTASSIUM SERPL-MCNC: 6.1 MMOL/L — HIGH (ref 3.5–5.3)
POTASSIUM SERPL-SCNC: 2.5 MMOL/L — CRITICAL LOW (ref 3.5–5.3)
POTASSIUM SERPL-SCNC: 2.9 MMOL/L — CRITICAL LOW (ref 3.5–5.3)
POTASSIUM SERPL-SCNC: 3.1 MMOL/L — LOW (ref 3.5–5.3)
POTASSIUM SERPL-SCNC: 3.2 MMOL/L — LOW (ref 3.5–5.3)
POTASSIUM SERPL-SCNC: 3.2 MMOL/L — LOW (ref 3.5–5.3)
POTASSIUM SERPL-SCNC: 3.3 MMOL/L — LOW (ref 3.5–5.3)
POTASSIUM SERPL-SCNC: 3.4 MMOL/L — LOW (ref 3.5–5.3)
POTASSIUM SERPL-SCNC: 3.5 MMOL/L — SIGNIFICANT CHANGE UP (ref 3.5–5.3)
POTASSIUM SERPL-SCNC: 3.6 MMOL/L — SIGNIFICANT CHANGE UP (ref 3.5–5.3)
POTASSIUM SERPL-SCNC: 3.7 MMOL/L — SIGNIFICANT CHANGE UP (ref 3.5–5.3)
POTASSIUM SERPL-SCNC: 3.7 MMOL/L — SIGNIFICANT CHANGE UP (ref 3.5–5.3)
POTASSIUM SERPL-SCNC: 3.8 MMOL/L — SIGNIFICANT CHANGE UP (ref 3.5–5.3)
POTASSIUM SERPL-SCNC: 3.9 MMOL/L — SIGNIFICANT CHANGE UP (ref 3.5–5.3)
POTASSIUM SERPL-SCNC: 3.9 MMOL/L — SIGNIFICANT CHANGE UP (ref 3.5–5.3)
POTASSIUM SERPL-SCNC: 4.1 MMOL/L — SIGNIFICANT CHANGE UP (ref 3.5–5.3)
POTASSIUM SERPL-SCNC: 4.2 MMOL/L — SIGNIFICANT CHANGE UP (ref 3.5–5.3)
POTASSIUM SERPL-SCNC: 5.4 MMOL/L — HIGH (ref 3.5–5.3)
POTASSIUM SERPL-SCNC: 6.1 MMOL/L — HIGH (ref 3.5–5.3)
PROMYELOCYTES # FLD: 8 % — HIGH (ref 0–0)
PROT SERPL-MCNC: 5.1 G/DL — LOW (ref 6–8.3)
PROT SERPL-MCNC: 5.2 G/DL — LOW (ref 6–8.3)
PROT SERPL-MCNC: 5.4 G/DL — LOW (ref 6–8.3)
PROT SERPL-MCNC: 5.6 G/DL — LOW (ref 6–8.3)
PROT SERPL-MCNC: 5.6 G/DL — LOW (ref 6–8.3)
PROT SERPL-MCNC: 5.7 G/DL — LOW (ref 6–8.3)
PROT SERPL-MCNC: 5.7 G/DL — LOW (ref 6–8.3)
PROT SERPL-MCNC: 5.8 G/DL — LOW (ref 6–8.3)
PROT SERPL-MCNC: 5.9 G/DL — LOW (ref 6–8.3)
PROT SERPL-MCNC: 6 G/DL — SIGNIFICANT CHANGE UP (ref 6–8.3)
PROT SERPL-MCNC: 6 G/DL — SIGNIFICANT CHANGE UP (ref 6–8.3)
PROT SERPL-MCNC: 6.1 G/DL — SIGNIFICANT CHANGE UP (ref 6–8.3)
PROT SERPL-MCNC: 6.1 G/DL — SIGNIFICANT CHANGE UP (ref 6–8.3)
PROT SERPL-MCNC: 6.2 G/DL — SIGNIFICANT CHANGE UP (ref 6–8.3)
PROT SERPL-MCNC: 6.3 G/DL — SIGNIFICANT CHANGE UP (ref 6–8.3)
PROT SERPL-MCNC: 6.4 G/DL — SIGNIFICANT CHANGE UP (ref 6–8.3)
PROT SERPL-MCNC: 6.5 G/DL — SIGNIFICANT CHANGE UP (ref 6–8.3)
PROT SERPL-MCNC: 6.7 G/DL — SIGNIFICANT CHANGE UP (ref 6–8.3)
PROT SERPL-MCNC: 7.6 G/DL — SIGNIFICANT CHANGE UP (ref 6–8.3)
PROT SERPL-MCNC: 7.6 G/DL — SIGNIFICANT CHANGE UP (ref 6–8.3)
PROT SERPL-MCNC: 8.3 G/DL — SIGNIFICANT CHANGE UP (ref 6–8.3)
PROT UR-MCNC: 100 — SIGNIFICANT CHANGE UP
PROTHROM AB SERPL-ACNC: 13 SEC — HIGH (ref 10–12.9)
PROTHROM AB SERPL-ACNC: 13.1 SEC — HIGH (ref 10–12.9)
PROTHROM AB SERPL-ACNC: 13.2 SEC — HIGH (ref 10–12.9)
PROTHROM AB SERPL-ACNC: 13.6 SEC — HIGH (ref 10–12.9)
PROTHROM AB SERPL-ACNC: 14 SEC — HIGH (ref 10–12.9)
PROTHROM AB SERPL-ACNC: 15.3 SEC — HIGH (ref 10–12.9)
PROTHROM AB SERPL-ACNC: 18.4 SEC — HIGH (ref 10–12.9)
PROTHROM AB SERPL-ACNC: 18.9 SEC — HIGH (ref 10–12.9)
PROTHROM AB SERPL-ACNC: 21.9 SEC — HIGH (ref 10–12.9)
PROTHROM AB SERPL-ACNC: 24.2 SEC — HIGH (ref 10–12.9)
PROTHROM AB SERPL-ACNC: 32 SEC — HIGH (ref 10–12.9)
RAPID RVP RESULT: SIGNIFICANT CHANGE UP
RBC # BLD: 2.12 M/UL — LOW (ref 3.8–5.2)
RBC # BLD: 2.35 M/UL — LOW (ref 3.8–5.2)
RBC # BLD: 2.48 M/UL — LOW (ref 3.8–5.2)
RBC # BLD: 2.49 M/UL — LOW (ref 3.8–5.2)
RBC # BLD: 2.49 M/UL — LOW (ref 3.8–5.2)
RBC # BLD: 2.5 M/UL — LOW (ref 3.8–5.2)
RBC # BLD: 2.5 M/UL — LOW (ref 3.8–5.2)
RBC # BLD: 2.59 M/UL — LOW (ref 3.8–5.2)
RBC # BLD: 2.62 M/UL — LOW (ref 3.8–5.2)
RBC # BLD: 2.64 M/UL — LOW (ref 3.8–5.2)
RBC # BLD: 2.69 M/UL — LOW (ref 3.8–5.2)
RBC # BLD: 2.71 M/UL — LOW (ref 3.8–5.2)
RBC # BLD: 2.74 M/UL — LOW (ref 3.8–5.2)
RBC # BLD: 2.78 M/UL — LOW (ref 3.8–5.2)
RBC # BLD: 2.82 M/UL — LOW (ref 3.8–5.2)
RBC # BLD: 2.86 M/UL — LOW (ref 3.8–5.2)
RBC # BLD: 2.88 M/UL — LOW (ref 3.8–5.2)
RBC # BLD: 2.89 M/UL — LOW (ref 3.8–5.2)
RBC # BLD: 2.97 M/UL — LOW (ref 3.8–5.2)
RBC # BLD: 3.02 M/UL — LOW (ref 3.8–5.2)
RBC # BLD: 3.09 M/UL — LOW (ref 3.8–5.2)
RBC # FLD: 17.9 % — HIGH (ref 10.3–14.5)
RBC # FLD: 18.3 % — HIGH (ref 10.3–14.5)
RBC # FLD: 18.5 % — HIGH (ref 10.3–14.5)
RBC # FLD: 18.7 % — HIGH (ref 10.3–14.5)
RBC # FLD: 19.3 % — HIGH (ref 10.3–14.5)
RBC # FLD: 19.4 % — HIGH (ref 10.3–14.5)
RBC # FLD: 19.5 % — HIGH (ref 10.3–14.5)
RBC # FLD: 19.6 % — HIGH (ref 10.3–14.5)
RBC # FLD: 19.7 % — HIGH (ref 10.3–14.5)
RBC # FLD: 19.7 % — HIGH (ref 10.3–14.5)
RBC # FLD: 19.9 % — HIGH (ref 10.3–14.5)
RBC # FLD: 20 % — HIGH (ref 10.3–14.5)
RBC # FLD: 20.3 % — HIGH (ref 10.3–14.5)
RBC # FLD: 20.3 % — HIGH (ref 10.3–14.5)
RBC # FLD: 21 % — HIGH (ref 10.3–14.5)
RBC # FLD: 21.1 % — HIGH (ref 10.3–14.5)
RBC # FLD: 23 % — HIGH (ref 10.3–14.5)
RBC # FLD: 23.2 % — HIGH (ref 10.3–14.5)
RBC # FLD: 23.5 % — HIGH (ref 10.3–14.5)
RBC # FLD: 25.2 % — HIGH (ref 10.3–14.5)
RBC BLD AUTO: ABNORMAL
RBC BLD AUTO: SIGNIFICANT CHANGE UP
RBC BLD AUTO: SIGNIFICANT CHANGE UP
RH IG SCN BLD-IMP: NEGATIVE — SIGNIFICANT CHANGE UP
SAO2 % BLDA: 98 % — HIGH (ref 92–96)
SAO2 % BLDA: 99 % — HIGH (ref 92–96)
SAO2 % BLDV: 33 % — LOW (ref 67–88)
SAO2 % BLDV: 51 % — LOW (ref 67–88)
SAO2 % BLDV: 52 % — LOW (ref 67–88)
SAO2 % BLDV: 54 % — LOW (ref 67–88)
SAO2 % BLDV: 56 % — LOW (ref 67–88)
SAO2 % BLDV: 56 % — LOW (ref 67–88)
SAO2 % BLDV: 60 % — LOW (ref 67–88)
SAO2 % BLDV: 76 % — SIGNIFICANT CHANGE UP (ref 67–88)
SMUDGE CELLS # BLD: PRESENT — SIGNIFICANT CHANGE UP
SODIUM BLDA-SCNC: 132 MMOL/L — LOW (ref 135–145)
SODIUM SERPL-SCNC: 109 MMOL/L — CRITICAL LOW (ref 135–145)
SODIUM SERPL-SCNC: 132 MMOL/L — LOW (ref 135–145)
SODIUM SERPL-SCNC: 133 MMOL/L — LOW (ref 135–145)
SODIUM SERPL-SCNC: 134 MMOL/L — LOW (ref 135–145)
SODIUM SERPL-SCNC: 135 MMOL/L — SIGNIFICANT CHANGE UP (ref 135–145)
SODIUM SERPL-SCNC: 136 MMOL/L — SIGNIFICANT CHANGE UP (ref 135–145)
SODIUM SERPL-SCNC: 136 MMOL/L — SIGNIFICANT CHANGE UP (ref 135–145)
SODIUM SERPL-SCNC: 137 MMOL/L — SIGNIFICANT CHANGE UP (ref 135–145)
SODIUM SERPL-SCNC: 142 MMOL/L — SIGNIFICANT CHANGE UP (ref 135–145)
SODIUM SERPL-SCNC: 143 MMOL/L — SIGNIFICANT CHANGE UP (ref 135–145)
SODIUM SERPL-SCNC: 144 MMOL/L — SIGNIFICANT CHANGE UP (ref 135–145)
SODIUM SERPL-SCNC: 145 MMOL/L — SIGNIFICANT CHANGE UP (ref 135–145)
SP GR SPEC: 1.02 — SIGNIFICANT CHANGE UP (ref 1.01–1.02)
SPECIMEN SOURCE: SIGNIFICANT CHANGE UP
SPHEROCYTES BLD QL SMEAR: SLIGHT — SIGNIFICANT CHANGE UP
TARGETS BLD QL SMEAR: SLIGHT — SIGNIFICANT CHANGE UP
TM INTERPRETATION: SIGNIFICANT CHANGE UP
TROPONIN I SERPL-MCNC: 0.12 NG/ML — HIGH (ref 0.01–0.04)
TROPONIN T, HIGH SENSITIVITY RESULT: 253 NG/L — HIGH (ref 0–51)
TROPONIN T, HIGH SENSITIVITY RESULT: 257 NG/L — HIGH (ref 0–51)
URATE SERPL-MCNC: 0.6 MG/DL — LOW (ref 2.5–7)
URATE SERPL-MCNC: 0.6 MG/DL — LOW (ref 2.5–7)
URATE SERPL-MCNC: 1.5 MG/DL — LOW (ref 2.5–7)
URATE SERPL-MCNC: 18.1 MG/DL — HIGH (ref 2.5–7)
URATE SERPL-MCNC: 2 MG/DL — LOW (ref 2.5–7)
URATE SERPL-MCNC: 2.9 MG/DL — SIGNIFICANT CHANGE UP (ref 2.5–7)
URATE SERPL-MCNC: 3.4 MG/DL — SIGNIFICANT CHANGE UP (ref 2.5–7)
URATE SERPL-MCNC: 3.7 MG/DL — SIGNIFICANT CHANGE UP (ref 2.5–7)
URATE SERPL-MCNC: <0.5 MG/DL — LOW (ref 2.5–7)
URATE SERPL-MCNC: <0.5 MG/DL — LOW (ref 2.5–7)
UROBILINOGEN FLD QL: NEGATIVE — SIGNIFICANT CHANGE UP
VANCOMYCIN TROUGH SERPL-MCNC: 10.9 UG/ML — SIGNIFICANT CHANGE UP (ref 10–20)
VANCOMYCIN TROUGH SERPL-MCNC: 11.9 UG/ML — SIGNIFICANT CHANGE UP (ref 10–20)
VANCOMYCIN TROUGH SERPL-MCNC: 4.7 UG/ML — LOW (ref 10–20)
VANCOMYCIN TROUGH SERPL-MCNC: 8.4 UG/ML — LOW (ref 10–20)
VANCOMYCIN TROUGH SERPL-MCNC: 8.9 UG/ML — LOW (ref 10–20)
VARIANT LYMPHS # BLD: 6.7 % — HIGH (ref 0–6)
WBC # BLD: 108 K/UL — CRITICAL HIGH (ref 3.8–10.5)
WBC # BLD: 12.5 K/UL — HIGH (ref 3.8–10.5)
WBC # BLD: 137 K/UL — CRITICAL HIGH (ref 3.8–10.5)
WBC # BLD: 14.2 K/UL — HIGH (ref 3.8–10.5)
WBC # BLD: 143 K/UL — CRITICAL HIGH (ref 3.8–10.5)
WBC # BLD: 163 K/UL — CRITICAL HIGH (ref 3.8–10.5)
WBC # BLD: 17.5 K/UL — HIGH (ref 3.8–10.5)
WBC # BLD: 184 K/UL — CRITICAL HIGH (ref 3.8–10.5)
WBC # BLD: 205 K/UL — CRITICAL HIGH (ref 3.8–10.5)
WBC # BLD: 21.69 K/UL — HIGH (ref 3.8–10.5)
WBC # BLD: 23 K/UL — HIGH (ref 3.8–10.5)
WBC # BLD: 238 K/UL — CRITICAL HIGH (ref 3.8–10.5)
WBC # BLD: 248.4 K/UL — CRITICAL HIGH (ref 3.8–10.5)
WBC # BLD: 25.8 K/UL — HIGH (ref 3.8–10.5)
WBC # BLD: 266 K/UL — CRITICAL HIGH (ref 3.8–10.5)
WBC # BLD: 27.34 K/UL — HIGH (ref 3.8–10.5)
WBC # BLD: 28.25 K/UL — HIGH (ref 3.8–10.5)
WBC # BLD: 33.4 K/UL — HIGH (ref 3.8–10.5)
WBC # BLD: 38.76 K/UL — HIGH (ref 3.8–10.5)
WBC # BLD: 55.7 K/UL — CRITICAL HIGH (ref 3.8–10.5)
WBC # BLD: 84.3 K/UL — CRITICAL HIGH (ref 3.8–10.5)
WBC # FLD AUTO: 108 K/UL — CRITICAL HIGH (ref 3.8–10.5)
WBC # FLD AUTO: 12.5 K/UL — HIGH (ref 3.8–10.5)
WBC # FLD AUTO: 137 K/UL — CRITICAL HIGH (ref 3.8–10.5)
WBC # FLD AUTO: 14.2 K/UL — HIGH (ref 3.8–10.5)
WBC # FLD AUTO: 143 K/UL — CRITICAL HIGH (ref 3.8–10.5)
WBC # FLD AUTO: 163 K/UL — CRITICAL HIGH (ref 3.8–10.5)
WBC # FLD AUTO: 17.5 K/UL — HIGH (ref 3.8–10.5)
WBC # FLD AUTO: 184 K/UL — CRITICAL HIGH (ref 3.8–10.5)
WBC # FLD AUTO: 205 K/UL — CRITICAL HIGH (ref 3.8–10.5)
WBC # FLD AUTO: 21.69 K/UL — HIGH (ref 3.8–10.5)
WBC # FLD AUTO: 23 K/UL — HIGH (ref 3.8–10.5)
WBC # FLD AUTO: 238 K/UL — CRITICAL HIGH (ref 3.8–10.5)
WBC # FLD AUTO: 248.4 K/UL — CRITICAL HIGH (ref 3.8–10.5)
WBC # FLD AUTO: 25.8 K/UL — HIGH (ref 3.8–10.5)
WBC # FLD AUTO: 266 K/UL — CRITICAL HIGH (ref 3.8–10.5)
WBC # FLD AUTO: 27.34 K/UL — HIGH (ref 3.8–10.5)
WBC # FLD AUTO: 28.25 K/UL — HIGH (ref 3.8–10.5)
WBC # FLD AUTO: 33.4 K/UL — HIGH (ref 3.8–10.5)
WBC # FLD AUTO: 38.76 K/UL — HIGH (ref 3.8–10.5)
WBC # FLD AUTO: 55.7 K/UL — CRITICAL HIGH (ref 3.8–10.5)
WBC # FLD AUTO: 84.3 K/UL — CRITICAL HIGH (ref 3.8–10.5)

## 2019-01-01 PROCEDURE — 99223 1ST HOSP IP/OBS HIGH 75: CPT | Mod: GC

## 2019-01-01 PROCEDURE — 88305 TISSUE EXAM BY PATHOLOGIST: CPT | Mod: 26

## 2019-01-01 PROCEDURE — 74176 CT ABD & PELVIS W/O CONTRAST: CPT | Mod: 26

## 2019-01-01 PROCEDURE — 99233 SBSQ HOSP IP/OBS HIGH 50: CPT

## 2019-01-01 PROCEDURE — 84484 ASSAY OF TROPONIN QUANT: CPT

## 2019-01-01 PROCEDURE — 99233 SBSQ HOSP IP/OBS HIGH 50: CPT | Mod: GC

## 2019-01-01 PROCEDURE — 99497 ADVNCD CARE PLAN 30 MIN: CPT | Mod: 25

## 2019-01-01 PROCEDURE — 86900 BLOOD TYPING SEROLOGIC ABO: CPT

## 2019-01-01 PROCEDURE — 80053 COMPREHEN METABOLIC PANEL: CPT

## 2019-01-01 PROCEDURE — 99292 CRITICAL CARE ADDL 30 MIN: CPT

## 2019-01-01 PROCEDURE — 99291 CRITICAL CARE FIRST HOUR: CPT

## 2019-01-01 PROCEDURE — 99291 CRITICAL CARE FIRST HOUR: CPT | Mod: GC

## 2019-01-01 PROCEDURE — 95951: CPT | Mod: 26

## 2019-01-01 PROCEDURE — 71045 X-RAY EXAM CHEST 1 VIEW: CPT | Mod: 26

## 2019-01-01 PROCEDURE — 85027 COMPLETE CBC AUTOMATED: CPT

## 2019-01-01 PROCEDURE — 88189 FLOWCYTOMETRY/READ 16 & >: CPT

## 2019-01-01 PROCEDURE — 93010 ELECTROCARDIOGRAM REPORT: CPT | Mod: GC

## 2019-01-01 PROCEDURE — 93010 ELECTROCARDIOGRAM REPORT: CPT

## 2019-01-01 PROCEDURE — 71045 X-RAY EXAM CHEST 1 VIEW: CPT | Mod: 26,77

## 2019-01-01 PROCEDURE — 99223 1ST HOSP IP/OBS HIGH 75: CPT

## 2019-01-01 PROCEDURE — 85097 BONE MARROW INTERPRETATION: CPT

## 2019-01-01 PROCEDURE — 36415 COLL VENOUS BLD VENIPUNCTURE: CPT

## 2019-01-01 PROCEDURE — 86850 RBC ANTIBODY SCREEN: CPT

## 2019-01-01 PROCEDURE — 88360 TUMOR IMMUNOHISTOCHEM/MANUAL: CPT | Mod: 26

## 2019-01-01 PROCEDURE — 70450 CT HEAD/BRAIN W/O DYE: CPT | Mod: 26

## 2019-01-01 PROCEDURE — 99222 1ST HOSP IP/OBS MODERATE 55: CPT | Mod: GC

## 2019-01-01 PROCEDURE — 99232 SBSQ HOSP IP/OBS MODERATE 35: CPT | Mod: GC

## 2019-01-01 PROCEDURE — 70450 CT HEAD/BRAIN W/O DYE: CPT

## 2019-01-01 PROCEDURE — 71250 CT THORAX DX C-: CPT | Mod: 26

## 2019-01-01 PROCEDURE — 90945 DIALYSIS ONE EVALUATION: CPT | Mod: GC

## 2019-01-01 PROCEDURE — G0452: CPT | Mod: 26

## 2019-01-01 PROCEDURE — 88342 IMHCHEM/IMCYTCHM 1ST ANTB: CPT | Mod: 26,59

## 2019-01-01 PROCEDURE — 93005 ELECTROCARDIOGRAM TRACING: CPT

## 2019-01-01 PROCEDURE — 36556 INSERT NON-TUNNEL CV CATH: CPT | Mod: GC

## 2019-01-01 PROCEDURE — 71045 X-RAY EXAM CHEST 1 VIEW: CPT

## 2019-01-01 PROCEDURE — 70450 CT HEAD/BRAIN W/O DYE: CPT | Mod: 26,77

## 2019-01-01 PROCEDURE — 93306 TTE W/DOPPLER COMPLETE: CPT | Mod: 26

## 2019-01-01 PROCEDURE — 88341 IMHCHEM/IMCYTCHM EA ADD ANTB: CPT | Mod: 26,59

## 2019-01-01 PROCEDURE — 88313 SPECIAL STAINS GROUP 2: CPT | Mod: 26

## 2019-01-01 PROCEDURE — 95816 EEG AWAKE AND DROWSY: CPT | Mod: 26

## 2019-01-01 PROCEDURE — 99231 SBSQ HOSP IP/OBS SF/LOW 25: CPT | Mod: GC

## 2019-01-01 PROCEDURE — 82962 GLUCOSE BLOOD TEST: CPT

## 2019-01-01 PROCEDURE — 85730 THROMBOPLASTIN TIME PARTIAL: CPT

## 2019-01-01 PROCEDURE — 85610 PROTHROMBIN TIME: CPT

## 2019-01-01 PROCEDURE — 86901 BLOOD TYPING SEROLOGIC RH(D): CPT

## 2019-01-01 RX ORDER — CHLORHEXIDINE GLUCONATE 213 G/1000ML
1 SOLUTION TOPICAL
Refills: 0 | Status: DISCONTINUED | OUTPATIENT
Start: 2019-01-01 | End: 2019-01-01

## 2019-01-01 RX ORDER — HYDROMORPHONE HYDROCHLORIDE 2 MG/ML
0.5 INJECTION INTRAMUSCULAR; INTRAVENOUS; SUBCUTANEOUS
Refills: 0 | Status: DISCONTINUED | OUTPATIENT
Start: 2019-01-01 | End: 2019-01-01

## 2019-01-01 RX ORDER — MAGNESIUM SULFATE 500 MG/ML
1 VIAL (ML) INJECTION ONCE
Refills: 0 | Status: COMPLETED | OUTPATIENT
Start: 2019-01-01 | End: 2019-01-01

## 2019-01-01 RX ORDER — CALCIUM GLUCONATE 100 MG/ML
2 VIAL (ML) INTRAVENOUS ONCE
Refills: 0 | Status: DISCONTINUED | OUTPATIENT
Start: 2019-01-01 | End: 2019-01-01

## 2019-01-01 RX ORDER — PHYTONADIONE (VIT K1) 5 MG
10 TABLET ORAL ONCE
Refills: 0 | Status: COMPLETED | OUTPATIENT
Start: 2019-01-01 | End: 2019-01-01

## 2019-01-01 RX ORDER — CALCIUM ACETATE 667 MG
1334 TABLET ORAL
Refills: 0 | Status: DISCONTINUED | OUTPATIENT
Start: 2019-01-01 | End: 2019-01-01

## 2019-01-01 RX ORDER — FUROSEMIDE 40 MG
40 TABLET ORAL ONCE
Refills: 0 | Status: COMPLETED | OUTPATIENT
Start: 2019-01-01 | End: 2019-01-01

## 2019-01-01 RX ORDER — AZITHROMYCIN 500 MG/1
500 TABLET, FILM COATED ORAL ONCE
Refills: 0 | Status: COMPLETED | OUTPATIENT
Start: 2019-01-01 | End: 2019-01-01

## 2019-01-01 RX ORDER — FUROSEMIDE 40 MG
80 TABLET ORAL ONCE
Refills: 0 | Status: COMPLETED | OUTPATIENT
Start: 2019-01-01 | End: 2019-01-01

## 2019-01-01 RX ORDER — MORPHINE SULFATE 50 MG/1
2 CAPSULE, EXTENDED RELEASE ORAL ONCE
Refills: 0 | Status: DISCONTINUED | OUTPATIENT
Start: 2019-01-01 | End: 2019-01-01

## 2019-01-01 RX ORDER — CEFTRIAXONE 500 MG/1
1000 INJECTION, POWDER, FOR SOLUTION INTRAMUSCULAR; INTRAVENOUS EVERY 24 HOURS
Refills: 0 | Status: DISCONTINUED | OUTPATIENT
Start: 2019-01-01 | End: 2019-01-01

## 2019-01-01 RX ORDER — LIDOCAINE 4 G/100G
1 CREAM TOPICAL ONCE
Refills: 0 | Status: COMPLETED | OUTPATIENT
Start: 2019-01-01 | End: 2019-01-01

## 2019-01-01 RX ORDER — INSULIN HUMAN 100 [IU]/ML
5 INJECTION, SOLUTION SUBCUTANEOUS ONCE
Refills: 0 | Status: COMPLETED | OUTPATIENT
Start: 2019-01-01 | End: 2019-01-01

## 2019-01-01 RX ORDER — POTASSIUM CHLORIDE 20 MEQ
40 PACKET (EA) ORAL ONCE
Refills: 0 | Status: DISCONTINUED | OUTPATIENT
Start: 2019-01-01 | End: 2019-01-01

## 2019-01-01 RX ORDER — SODIUM CHLORIDE 9 MG/ML
1000 INJECTION INTRAMUSCULAR; INTRAVENOUS; SUBCUTANEOUS ONCE
Refills: 0 | Status: DISCONTINUED | OUTPATIENT
Start: 2019-01-01 | End: 2019-01-01

## 2019-01-01 RX ORDER — AZITHROMYCIN 500 MG/1
500 TABLET, FILM COATED ORAL EVERY 24 HOURS
Refills: 0 | Status: DISCONTINUED | OUTPATIENT
Start: 2019-01-01 | End: 2019-01-01

## 2019-01-01 RX ORDER — LEVETIRACETAM 250 MG/1
1000 TABLET, FILM COATED ORAL ONCE
Refills: 0 | Status: COMPLETED | OUTPATIENT
Start: 2019-01-01 | End: 2019-01-01

## 2019-01-01 RX ORDER — SODIUM CHLORIDE 9 MG/ML
1000 INJECTION, SOLUTION INTRAVENOUS
Refills: 0 | Status: DISCONTINUED | OUTPATIENT
Start: 2019-01-01 | End: 2019-01-01

## 2019-01-01 RX ORDER — VANCOMYCIN HCL 1 G
1000 VIAL (EA) INTRAVENOUS ONCE
Refills: 0 | Status: COMPLETED | OUTPATIENT
Start: 2019-01-01 | End: 2019-01-01

## 2019-01-01 RX ORDER — DEXTROSE 50 % IN WATER 50 %
50 SYRINGE (ML) INTRAVENOUS ONCE
Refills: 0 | Status: COMPLETED | OUTPATIENT
Start: 2019-01-01 | End: 2019-01-01

## 2019-01-01 RX ORDER — POTASSIUM CHLORIDE 20 MEQ
20 PACKET (EA) ORAL ONCE
Refills: 0 | Status: COMPLETED | OUTPATIENT
Start: 2019-01-01 | End: 2019-01-01

## 2019-01-01 RX ORDER — POTASSIUM CHLORIDE 20 MEQ
10 PACKET (EA) ORAL
Refills: 0 | Status: COMPLETED | OUTPATIENT
Start: 2019-01-01 | End: 2019-01-01

## 2019-01-01 RX ORDER — METOPROLOL TARTRATE 50 MG
5 TABLET ORAL ONCE
Refills: 0 | Status: COMPLETED | OUTPATIENT
Start: 2019-01-01 | End: 2019-01-01

## 2019-01-01 RX ORDER — RASBURICASE 7.5 MG
6 KIT INTRAVENOUS ONCE
Refills: 0 | Status: DISCONTINUED | OUTPATIENT
Start: 2019-01-01 | End: 2019-01-01

## 2019-01-01 RX ORDER — METOPROLOL TARTRATE 50 MG
2.5 TABLET ORAL EVERY 6 HOURS
Refills: 0 | Status: DISCONTINUED | OUTPATIENT
Start: 2019-01-01 | End: 2019-01-01

## 2019-01-01 RX ORDER — HYDROXYUREA 500 MG/1
2000 CAPSULE ORAL ONCE
Refills: 0 | Status: COMPLETED | OUTPATIENT
Start: 2019-01-01 | End: 2019-01-01

## 2019-01-01 RX ORDER — FENTANYL CITRATE 50 UG/ML
12.5 INJECTION INTRAVENOUS ONCE
Refills: 0 | Status: DISCONTINUED | OUTPATIENT
Start: 2019-01-01 | End: 2019-01-01

## 2019-01-01 RX ORDER — ALTEPLASE 100 MG
2 KIT INTRAVENOUS ONCE
Refills: 0 | Status: DISCONTINUED | OUTPATIENT
Start: 2019-01-01 | End: 2019-01-01

## 2019-01-01 RX ORDER — LEVETIRACETAM 250 MG/1
750 TABLET, FILM COATED ORAL
Refills: 0 | Status: DISCONTINUED | OUTPATIENT
Start: 2019-01-01 | End: 2019-01-01

## 2019-01-01 RX ORDER — POTASSIUM CHLORIDE 20 MEQ
40 PACKET (EA) ORAL ONCE
Refills: 0 | Status: COMPLETED | OUTPATIENT
Start: 2019-01-01 | End: 2019-01-01

## 2019-01-01 RX ORDER — VANCOMYCIN HCL 1 G
750 VIAL (EA) INTRAVENOUS DAILY
Refills: 0 | Status: DISCONTINUED | OUTPATIENT
Start: 2019-01-01 | End: 2019-01-01

## 2019-01-01 RX ORDER — ONDANSETRON 8 MG/1
4 TABLET, FILM COATED ORAL EVERY 6 HOURS
Refills: 0 | Status: DISCONTINUED | OUTPATIENT
Start: 2019-01-01 | End: 2019-01-01

## 2019-01-01 RX ORDER — LEVETIRACETAM 250 MG/1
750 TABLET, FILM COATED ORAL EVERY 12 HOURS
Refills: 0 | Status: DISCONTINUED | OUTPATIENT
Start: 2019-01-01 | End: 2019-01-01

## 2019-01-01 RX ORDER — POTASSIUM CHLORIDE 20 MEQ
10 PACKET (EA) ORAL ONCE
Refills: 0 | Status: COMPLETED | OUTPATIENT
Start: 2019-01-01 | End: 2019-01-01

## 2019-01-01 RX ORDER — ALLOPURINOL 300 MG
150 TABLET ORAL ONCE
Refills: 0 | Status: DISCONTINUED | OUTPATIENT
Start: 2019-01-01 | End: 2019-01-01

## 2019-01-01 RX ORDER — POTASSIUM PHOSPHATE, MONOBASIC POTASSIUM PHOSPHATE, DIBASIC 236; 224 MG/ML; MG/ML
15 INJECTION, SOLUTION INTRAVENOUS ONCE
Refills: 0 | Status: COMPLETED | OUTPATIENT
Start: 2019-01-01 | End: 2019-01-01

## 2019-01-01 RX ORDER — FENTANYL CITRATE 50 UG/ML
25 INJECTION INTRAVENOUS ONCE
Refills: 0 | Status: DISCONTINUED | OUTPATIENT
Start: 2019-01-01 | End: 2019-01-01

## 2019-01-01 RX ORDER — ASPIRIN/CALCIUM CARB/MAGNESIUM 324 MG
0 TABLET ORAL
Qty: 0 | Refills: 0 | DISCHARGE

## 2019-01-01 RX ORDER — DESMOPRESSIN ACETATE 0.1 MG/1
20 TABLET ORAL ONCE
Refills: 0 | Status: COMPLETED | OUTPATIENT
Start: 2019-01-01 | End: 2019-01-01

## 2019-01-01 RX ORDER — RASBURICASE 7.5 MG
6 KIT INTRAVENOUS ONCE
Refills: 0 | Status: COMPLETED | OUTPATIENT
Start: 2019-01-01 | End: 2019-01-01

## 2019-01-01 RX ORDER — VANCOMYCIN HCL 1 G
1000 VIAL (EA) INTRAVENOUS EVERY 24 HOURS
Refills: 0 | Status: DISCONTINUED | OUTPATIENT
Start: 2019-01-01 | End: 2019-01-01

## 2019-01-01 RX ORDER — MORPHINE SULFATE 50 MG/1
1 CAPSULE, EXTENDED RELEASE ORAL EVERY 4 HOURS
Refills: 0 | Status: DISCONTINUED | OUTPATIENT
Start: 2019-01-01 | End: 2019-01-01

## 2019-01-01 RX ORDER — HYDROMORPHONE HYDROCHLORIDE 2 MG/ML
0.2 INJECTION INTRAMUSCULAR; INTRAVENOUS; SUBCUTANEOUS
Refills: 0 | Status: DISCONTINUED | OUTPATIENT
Start: 2019-01-01 | End: 2019-01-01

## 2019-01-01 RX ORDER — DEXMEDETOMIDINE HYDROCHLORIDE IN 0.9% SODIUM CHLORIDE 4 UG/ML
0.5 INJECTION INTRAVENOUS
Qty: 200 | Refills: 0 | Status: DISCONTINUED | OUTPATIENT
Start: 2019-01-01 | End: 2019-01-01

## 2019-01-01 RX ORDER — FENTANYL CITRATE 50 UG/ML
50 INJECTION INTRAVENOUS ONCE
Refills: 0 | Status: DISCONTINUED | OUTPATIENT
Start: 2019-01-01 | End: 2019-01-01

## 2019-01-01 RX ORDER — DEXTROSE 10 % IN WATER 10 %
1000 INTRAVENOUS SOLUTION INTRAVENOUS
Refills: 0 | Status: DISCONTINUED | OUTPATIENT
Start: 2019-01-01 | End: 2019-01-01

## 2019-01-01 RX ORDER — ACETAMINOPHEN 500 MG
650 TABLET ORAL ONCE
Refills: 0 | Status: COMPLETED | OUTPATIENT
Start: 2019-01-01 | End: 2019-01-01

## 2019-01-01 RX ORDER — IPRATROPIUM BROMIDE 0.2 MG/ML
500 SOLUTION, NON-ORAL INHALATION ONCE
Refills: 0 | Status: COMPLETED | OUTPATIENT
Start: 2019-01-01 | End: 2019-01-01

## 2019-01-01 RX ORDER — CALCIUM GLUCONATE 100 MG/ML
2 VIAL (ML) INTRAVENOUS ONCE
Refills: 0 | Status: COMPLETED | OUTPATIENT
Start: 2019-01-01 | End: 2019-01-01

## 2019-01-01 RX ORDER — LIDOCAINE HCL 20 MG/ML
1 VIAL (ML) INJECTION ONCE
Refills: 0 | Status: COMPLETED | OUTPATIENT
Start: 2019-01-01 | End: 2019-01-01

## 2019-01-01 RX ORDER — ROBINUL 0.2 MG/ML
0.4 INJECTION INTRAMUSCULAR; INTRAVENOUS EVERY 6 HOURS
Refills: 0 | Status: DISCONTINUED | OUTPATIENT
Start: 2019-01-01 | End: 2019-01-01

## 2019-01-01 RX ORDER — MEROPENEM 1 G/30ML
1000 INJECTION INTRAVENOUS EVERY 8 HOURS
Refills: 0 | Status: DISCONTINUED | OUTPATIENT
Start: 2019-01-01 | End: 2019-01-01

## 2019-01-01 RX ORDER — ACETAMINOPHEN 500 MG
650 TABLET ORAL EVERY 6 HOURS
Refills: 0 | Status: DISCONTINUED | OUTPATIENT
Start: 2019-01-01 | End: 2019-01-01

## 2019-01-01 RX ORDER — DESMOPRESSIN ACETATE 0.1 MG/1
20 TABLET ORAL ONCE
Refills: 0 | Status: DISCONTINUED | OUTPATIENT
Start: 2019-01-01 | End: 2019-01-01

## 2019-01-01 RX ORDER — PIPERACILLIN AND TAZOBACTAM 4; .5 G/20ML; G/20ML
3.38 INJECTION, POWDER, LYOPHILIZED, FOR SOLUTION INTRAVENOUS ONCE
Refills: 0 | Status: COMPLETED | OUTPATIENT
Start: 2019-01-01 | End: 2019-01-01

## 2019-01-01 RX ORDER — HYDROXYUREA 500 MG/1
0 CAPSULE ORAL
Qty: 0 | Refills: 0 | DISCHARGE

## 2019-01-01 RX ORDER — AZITHROMYCIN 500 MG/1
TABLET, FILM COATED ORAL
Refills: 0 | Status: DISCONTINUED | OUTPATIENT
Start: 2019-01-01 | End: 2019-01-01

## 2019-01-01 RX ORDER — ACETAMINOPHEN 500 MG
975 TABLET ORAL ONCE
Refills: 0 | Status: COMPLETED | OUTPATIENT
Start: 2019-01-01 | End: 2019-01-01

## 2019-01-01 RX ORDER — HYDROXYUREA 500 MG/1
2000 CAPSULE ORAL
Refills: 0 | Status: DISCONTINUED | OUTPATIENT
Start: 2019-01-01 | End: 2019-01-01

## 2019-01-01 RX ORDER — NOREPINEPHRINE BITARTRATE/D5W 8 MG/250ML
0.05 PLASTIC BAG, INJECTION (ML) INTRAVENOUS
Qty: 8 | Refills: 0 | Status: DISCONTINUED | OUTPATIENT
Start: 2019-01-01 | End: 2019-01-01

## 2019-01-01 RX ORDER — PIPERACILLIN AND TAZOBACTAM 4; .5 G/20ML; G/20ML
3.38 INJECTION, POWDER, LYOPHILIZED, FOR SOLUTION INTRAVENOUS EVERY 12 HOURS
Refills: 0 | Status: DISCONTINUED | OUTPATIENT
Start: 2019-01-01 | End: 2019-01-01

## 2019-01-01 RX ORDER — SODIUM CHLORIDE 9 MG/ML
1000 INJECTION, SOLUTION INTRAVENOUS ONCE
Refills: 0 | Status: COMPLETED | OUTPATIENT
Start: 2019-01-01 | End: 2019-01-01

## 2019-01-01 RX ORDER — METOPROLOL TARTRATE 50 MG
0 TABLET ORAL
Qty: 0 | Refills: 0 | DISCHARGE

## 2019-01-01 RX ORDER — HEPARIN SODIUM 5000 [USP'U]/ML
5000 INJECTION INTRAVENOUS; SUBCUTANEOUS ONCE
Refills: 0 | Status: COMPLETED | OUTPATIENT
Start: 2019-01-01 | End: 2019-01-01

## 2019-01-01 RX ORDER — FENTANYL CITRATE 50 UG/ML
100 INJECTION INTRAVENOUS ONCE
Refills: 0 | Status: DISCONTINUED | OUTPATIENT
Start: 2019-01-01 | End: 2019-01-01

## 2019-01-01 RX ORDER — LEVETIRACETAM 250 MG/1
1000 TABLET, FILM COATED ORAL ONCE
Refills: 0 | Status: DISCONTINUED | OUTPATIENT
Start: 2019-01-01 | End: 2019-01-01

## 2019-01-01 RX ORDER — CALCIUM GLUCONATE 100 MG/ML
2 VIAL (ML) INTRAVENOUS EVERY 6 HOURS
Refills: 0 | Status: DISCONTINUED | OUTPATIENT
Start: 2019-01-01 | End: 2019-01-01

## 2019-01-01 RX ORDER — HYDROMORPHONE HYDROCHLORIDE 2 MG/ML
0.5 INJECTION INTRAMUSCULAR; INTRAVENOUS; SUBCUTANEOUS EVERY 6 HOURS
Refills: 0 | Status: DISCONTINUED | OUTPATIENT
Start: 2019-01-01 | End: 2019-01-01

## 2019-01-01 RX ORDER — SODIUM BICARBONATE 1 MEQ/ML
50 SYRINGE (ML) INTRAVENOUS ONCE
Refills: 0 | Status: DISCONTINUED | OUTPATIENT
Start: 2019-01-01 | End: 2019-01-01

## 2019-01-01 RX ORDER — IPRATROPIUM/ALBUTEROL SULFATE 18-103MCG
3 AEROSOL WITH ADAPTER (GRAM) INHALATION EVERY 6 HOURS
Refills: 0 | Status: DISCONTINUED | OUTPATIENT
Start: 2019-01-01 | End: 2019-01-01

## 2019-01-01 RX ADMIN — Medication 100 MILLIEQUIVALENT(S): at 04:10

## 2019-01-01 RX ADMIN — Medication 200 GRAM(S): at 21:01

## 2019-01-01 RX ADMIN — Medication 4.89 MICROGRAM(S)/KG/MIN: at 08:11

## 2019-01-01 RX ADMIN — FENTANYL CITRATE 12.5 MICROGRAM(S): 50 INJECTION INTRAVENOUS at 03:25

## 2019-01-01 RX ADMIN — FENTANYL CITRATE 25 MICROGRAM(S): 50 INJECTION INTRAVENOUS at 14:55

## 2019-01-01 RX ADMIN — Medication 0.5 MILLIGRAM(S): at 05:29

## 2019-01-01 RX ADMIN — CHLORHEXIDINE GLUCONATE 1 APPLICATION(S): 213 SOLUTION TOPICAL at 05:04

## 2019-01-01 RX ADMIN — Medication 100 GRAM(S): at 01:04

## 2019-01-01 RX ADMIN — LEVETIRACETAM 400 MILLIGRAM(S): 250 TABLET, FILM COATED ORAL at 05:05

## 2019-01-01 RX ADMIN — SODIUM CHLORIDE 50 MILLILITER(S): 9 INJECTION, SOLUTION INTRAVENOUS at 19:01

## 2019-01-01 RX ADMIN — CEFTRIAXONE 100 MILLIGRAM(S): 500 INJECTION, POWDER, FOR SOLUTION INTRAMUSCULAR; INTRAVENOUS at 11:56

## 2019-01-01 RX ADMIN — Medication 0.5 MILLIGRAM(S): at 15:22

## 2019-01-01 RX ADMIN — Medication 0.5 MILLIGRAM(S): at 19:52

## 2019-01-01 RX ADMIN — LEVETIRACETAM 400 MILLIGRAM(S): 250 TABLET, FILM COATED ORAL at 01:59

## 2019-01-01 RX ADMIN — ROBINUL 0.4 MILLIGRAM(S): 0.2 INJECTION INTRAMUSCULAR; INTRAVENOUS at 11:31

## 2019-01-01 RX ADMIN — Medication 3 MILLILITER(S): at 17:09

## 2019-01-01 RX ADMIN — DEXMEDETOMIDINE HYDROCHLORIDE IN 0.9% SODIUM CHLORIDE 6.53 MICROGRAM(S)/KG/HR: 4 INJECTION INTRAVENOUS at 12:45

## 2019-01-01 RX ADMIN — CEFTRIAXONE 100 MILLIGRAM(S): 500 INJECTION, POWDER, FOR SOLUTION INTRAMUSCULAR; INTRAVENOUS at 11:16

## 2019-01-01 RX ADMIN — Medication 100 GRAM(S): at 23:53

## 2019-01-01 RX ADMIN — LEVETIRACETAM 400 MILLIGRAM(S): 250 TABLET, FILM COATED ORAL at 05:34

## 2019-01-01 RX ADMIN — FENTANYL CITRATE 12.5 MICROGRAM(S): 50 INJECTION INTRAVENOUS at 03:11

## 2019-01-01 RX ADMIN — Medication 2 MILLIGRAM(S): at 11:31

## 2019-01-01 RX ADMIN — Medication 200 GRAM(S): at 22:37

## 2019-01-01 RX ADMIN — Medication 1 MILLIGRAM(S): at 22:11

## 2019-01-01 RX ADMIN — Medication 3 MILLILITER(S): at 05:28

## 2019-01-01 RX ADMIN — LEVETIRACETAM 400 MILLIGRAM(S): 250 TABLET, FILM COATED ORAL at 17:01

## 2019-01-01 RX ADMIN — Medication 3 MILLILITER(S): at 17:42

## 2019-01-01 RX ADMIN — HYDROMORPHONE HYDROCHLORIDE 0.5 MILLIGRAM(S): 2 INJECTION INTRAMUSCULAR; INTRAVENOUS; SUBCUTANEOUS at 05:29

## 2019-01-01 RX ADMIN — RASBURICASE 108 MILLIGRAM(S): KIT at 14:07

## 2019-01-01 RX ADMIN — Medication 50 MILLILITER(S): at 07:58

## 2019-01-01 RX ADMIN — Medication 200 GRAM(S): at 16:03

## 2019-01-01 RX ADMIN — FENTANYL CITRATE 25 MICROGRAM(S): 50 INJECTION INTRAVENOUS at 13:54

## 2019-01-01 RX ADMIN — Medication 2 MILLIGRAM(S): at 00:53

## 2019-01-01 RX ADMIN — Medication 80 MILLIGRAM(S): at 15:12

## 2019-01-01 RX ADMIN — Medication 200 GRAM(S): at 03:41

## 2019-01-01 RX ADMIN — Medication 40 MILLIEQUIVALENT(S): at 20:10

## 2019-01-01 RX ADMIN — Medication 50 MILLILITER(S): at 18:07

## 2019-01-01 RX ADMIN — SODIUM CHLORIDE 100 MILLILITER(S): 9 INJECTION, SOLUTION INTRAVENOUS at 01:59

## 2019-01-01 RX ADMIN — Medication 250 MILLIGRAM(S): at 14:29

## 2019-01-01 RX ADMIN — MEROPENEM 100 MILLIGRAM(S): 1 INJECTION INTRAVENOUS at 06:05

## 2019-01-01 RX ADMIN — SODIUM CHLORIDE 2000 MILLILITER(S): 9 INJECTION, SOLUTION INTRAVENOUS at 11:02

## 2019-01-01 RX ADMIN — Medication 3 MILLILITER(S): at 11:55

## 2019-01-01 RX ADMIN — HYDROMORPHONE HYDROCHLORIDE 0.5 MILLIGRAM(S): 2 INJECTION INTRAMUSCULAR; INTRAVENOUS; SUBCUTANEOUS at 11:31

## 2019-01-01 RX ADMIN — FENTANYL CITRATE 100 MICROGRAM(S): 50 INJECTION INTRAVENOUS at 10:52

## 2019-01-01 RX ADMIN — MEROPENEM 100 MILLIGRAM(S): 1 INJECTION INTRAVENOUS at 06:32

## 2019-01-01 RX ADMIN — Medication 250 MILLIGRAM(S): at 02:05

## 2019-01-01 RX ADMIN — HYDROXYUREA 2000 MILLIGRAM(S): 500 CAPSULE ORAL at 03:03

## 2019-01-01 RX ADMIN — HYDROMORPHONE HYDROCHLORIDE 0.2 MILLIGRAM(S): 2 INJECTION INTRAMUSCULAR; INTRAVENOUS; SUBCUTANEOUS at 02:37

## 2019-01-01 RX ADMIN — LEVETIRACETAM 400 MILLIGRAM(S): 250 TABLET, FILM COATED ORAL at 17:45

## 2019-01-01 RX ADMIN — Medication 1 MILLIGRAM(S): at 06:05

## 2019-01-01 RX ADMIN — Medication 975 MILLIGRAM(S): at 01:55

## 2019-01-01 RX ADMIN — LIDOCAINE 1 PATCH: 4 CREAM TOPICAL at 01:03

## 2019-01-01 RX ADMIN — SODIUM CHLORIDE 100 MILLILITER(S): 9 INJECTION, SOLUTION INTRAVENOUS at 06:43

## 2019-01-01 RX ADMIN — Medication 100 MILLIEQUIVALENT(S): at 21:36

## 2019-01-01 RX ADMIN — MEROPENEM 100 MILLIGRAM(S): 1 INJECTION INTRAVENOUS at 22:24

## 2019-01-01 RX ADMIN — Medication 1 MILLIGRAM(S): at 18:00

## 2019-01-01 RX ADMIN — Medication 50 MILLILITER(S): at 06:05

## 2019-01-01 RX ADMIN — CHLORHEXIDINE GLUCONATE 1 APPLICATION(S): 213 SOLUTION TOPICAL at 05:09

## 2019-01-01 RX ADMIN — Medication 200 GRAM(S): at 15:13

## 2019-01-01 RX ADMIN — Medication 80 MILLIGRAM(S): at 00:10

## 2019-01-01 RX ADMIN — Medication 0.5 MILLIGRAM(S): at 02:30

## 2019-01-01 RX ADMIN — ROBINUL 0.4 MILLIGRAM(S): 0.2 INJECTION INTRAMUSCULAR; INTRAVENOUS at 05:29

## 2019-01-01 RX ADMIN — Medication 200 GRAM(S): at 15:36

## 2019-01-01 RX ADMIN — Medication 2 MILLIGRAM(S): at 08:03

## 2019-01-01 RX ADMIN — FENTANYL CITRATE 25 MICROGRAM(S): 50 INJECTION INTRAVENOUS at 12:40

## 2019-01-01 RX ADMIN — HYDROMORPHONE HYDROCHLORIDE 0.5 MILLIGRAM(S): 2 INJECTION INTRAMUSCULAR; INTRAVENOUS; SUBCUTANEOUS at 23:37

## 2019-01-01 RX ADMIN — FENTANYL CITRATE 25 MICROGRAM(S): 50 INJECTION INTRAVENOUS at 14:40

## 2019-01-01 RX ADMIN — Medication 650 MILLIGRAM(S): at 05:30

## 2019-01-01 RX ADMIN — Medication 200 GRAM(S): at 03:27

## 2019-01-01 RX ADMIN — Medication 3 MILLILITER(S): at 17:58

## 2019-01-01 RX ADMIN — Medication 200 GRAM(S): at 05:09

## 2019-01-01 RX ADMIN — LEVETIRACETAM 400 MILLIGRAM(S): 250 TABLET, FILM COATED ORAL at 18:10

## 2019-01-01 RX ADMIN — Medication 100 MILLIEQUIVALENT(S): at 02:23

## 2019-01-01 RX ADMIN — DESMOPRESSIN ACETATE 220 MICROGRAM(S): 0.1 TABLET ORAL at 20:10

## 2019-01-01 RX ADMIN — LEVETIRACETAM 400 MILLIGRAM(S): 250 TABLET, FILM COATED ORAL at 05:25

## 2019-01-01 RX ADMIN — Medication 0.5 MILLIGRAM(S): at 13:05

## 2019-01-01 RX ADMIN — MEROPENEM 100 MILLIGRAM(S): 1 INJECTION INTRAVENOUS at 06:59

## 2019-01-01 RX ADMIN — Medication 1 MILLIGRAM(S): at 05:02

## 2019-01-01 RX ADMIN — Medication 3 MILLILITER(S): at 23:47

## 2019-01-01 RX ADMIN — DEXMEDETOMIDINE HYDROCHLORIDE IN 0.9% SODIUM CHLORIDE 6.53 MICROGRAM(S)/KG/HR: 4 INJECTION INTRAVENOUS at 21:02

## 2019-01-01 RX ADMIN — Medication 50 MILLILITER(S): at 00:05

## 2019-01-01 RX ADMIN — Medication 250 MILLIGRAM(S): at 15:17

## 2019-01-01 RX ADMIN — Medication 50 MILLILITER(S): at 10:06

## 2019-01-01 RX ADMIN — HYDROMORPHONE HYDROCHLORIDE 0.2 MILLIGRAM(S): 2 INJECTION INTRAMUSCULAR; INTRAVENOUS; SUBCUTANEOUS at 08:19

## 2019-01-01 RX ADMIN — HYDROMORPHONE HYDROCHLORIDE 0.2 MILLIGRAM(S): 2 INJECTION INTRAMUSCULAR; INTRAVENOUS; SUBCUTANEOUS at 22:23

## 2019-01-01 RX ADMIN — FENTANYL CITRATE 25 MICROGRAM(S): 50 INJECTION INTRAVENOUS at 11:00

## 2019-01-01 RX ADMIN — MEROPENEM 100 MILLIGRAM(S): 1 INJECTION INTRAVENOUS at 21:36

## 2019-01-01 RX ADMIN — Medication 3 MILLILITER(S): at 05:36

## 2019-01-01 RX ADMIN — CHLORHEXIDINE GLUCONATE 1 APPLICATION(S): 213 SOLUTION TOPICAL at 11:00

## 2019-01-01 RX ADMIN — MORPHINE SULFATE 2 MILLIGRAM(S): 50 CAPSULE, EXTENDED RELEASE ORAL at 22:59

## 2019-01-01 RX ADMIN — Medication 0.5 MILLIGRAM(S): at 02:01

## 2019-01-01 RX ADMIN — MEROPENEM 100 MILLIGRAM(S): 1 INJECTION INTRAVENOUS at 13:12

## 2019-01-01 RX ADMIN — Medication 100 MILLIEQUIVALENT(S): at 02:55

## 2019-01-01 RX ADMIN — Medication 3 MILLILITER(S): at 05:20

## 2019-01-01 RX ADMIN — Medication 3 MILLILITER(S): at 23:00

## 2019-01-01 RX ADMIN — Medication 200 GRAM(S): at 22:24

## 2019-01-01 RX ADMIN — Medication 100 MILLIEQUIVALENT(S): at 02:26

## 2019-01-01 RX ADMIN — Medication 100 MILLIEQUIVALENT(S): at 01:05

## 2019-01-01 RX ADMIN — LEVETIRACETAM 400 MILLIGRAM(S): 250 TABLET, FILM COATED ORAL at 05:00

## 2019-01-01 RX ADMIN — Medication 40 MILLIGRAM(S): at 13:05

## 2019-01-01 RX ADMIN — Medication 3 MILLILITER(S): at 00:05

## 2019-01-01 RX ADMIN — HYDROMORPHONE HYDROCHLORIDE 0.2 MILLIGRAM(S): 2 INJECTION INTRAMUSCULAR; INTRAVENOUS; SUBCUTANEOUS at 16:16

## 2019-01-01 RX ADMIN — LIDOCAINE 1 PATCH: 4 CREAM TOPICAL at 07:31

## 2019-01-01 RX ADMIN — INSULIN HUMAN 5 UNIT(S): 100 INJECTION, SOLUTION SUBCUTANEOUS at 12:23

## 2019-01-01 RX ADMIN — LEVETIRACETAM 400 MILLIGRAM(S): 250 TABLET, FILM COATED ORAL at 18:47

## 2019-01-01 RX ADMIN — FENTANYL CITRATE 100 MICROGRAM(S): 50 INJECTION INTRAVENOUS at 11:58

## 2019-01-01 RX ADMIN — Medication 3 MILLILITER(S): at 23:58

## 2019-01-01 RX ADMIN — HYDROMORPHONE HYDROCHLORIDE 0.2 MILLIGRAM(S): 2 INJECTION INTRAMUSCULAR; INTRAVENOUS; SUBCUTANEOUS at 18:25

## 2019-01-01 RX ADMIN — MEROPENEM 100 MILLIGRAM(S): 1 INJECTION INTRAVENOUS at 23:04

## 2019-01-01 RX ADMIN — CHLORHEXIDINE GLUCONATE 1 APPLICATION(S): 213 SOLUTION TOPICAL at 05:16

## 2019-01-01 RX ADMIN — LIDOCAINE 1 PATCH: 4 CREAM TOPICAL at 13:00

## 2019-01-01 RX ADMIN — HYDROMORPHONE HYDROCHLORIDE 0.5 MILLIGRAM(S): 2 INJECTION INTRAMUSCULAR; INTRAVENOUS; SUBCUTANEOUS at 13:50

## 2019-01-01 RX ADMIN — HYDROMORPHONE HYDROCHLORIDE 0.2 MILLIGRAM(S): 2 INJECTION INTRAMUSCULAR; INTRAVENOUS; SUBCUTANEOUS at 19:00

## 2019-01-01 RX ADMIN — Medication 0.5 MILLIGRAM(S): at 10:29

## 2019-01-01 RX ADMIN — POTASSIUM PHOSPHATE, MONOBASIC POTASSIUM PHOSPHATE, DIBASIC 62.5 MILLIMOLE(S): 236; 224 INJECTION, SOLUTION INTRAVENOUS at 03:38

## 2019-01-01 RX ADMIN — CHLORHEXIDINE GLUCONATE 1 APPLICATION(S): 213 SOLUTION TOPICAL at 06:59

## 2019-01-01 RX ADMIN — AZITHROMYCIN 250 MILLIGRAM(S): 500 TABLET, FILM COATED ORAL at 00:10

## 2019-01-01 RX ADMIN — LEVETIRACETAM 400 MILLIGRAM(S): 250 TABLET, FILM COATED ORAL at 17:52

## 2019-01-01 RX ADMIN — POTASSIUM PHOSPHATE, MONOBASIC POTASSIUM PHOSPHATE, DIBASIC 62.5 MILLIMOLE(S): 236; 224 INJECTION, SOLUTION INTRAVENOUS at 21:25

## 2019-01-01 RX ADMIN — Medication 3 MILLILITER(S): at 11:52

## 2019-01-01 RX ADMIN — Medication 70 MILLILITER(S): at 01:47

## 2019-01-01 RX ADMIN — SODIUM CHLORIDE 200 MILLILITER(S): 9 INJECTION, SOLUTION INTRAVENOUS at 14:03

## 2019-01-01 RX ADMIN — LEVETIRACETAM 400 MILLIGRAM(S): 250 TABLET, FILM COATED ORAL at 17:10

## 2019-01-01 RX ADMIN — Medication 0.5 MILLIGRAM(S): at 18:57

## 2019-01-01 RX ADMIN — MEROPENEM 100 MILLIGRAM(S): 1 INJECTION INTRAVENOUS at 14:42

## 2019-01-01 RX ADMIN — LEVETIRACETAM 400 MILLIGRAM(S): 250 TABLET, FILM COATED ORAL at 05:03

## 2019-01-01 RX ADMIN — Medication 200 GRAM(S): at 10:07

## 2019-01-01 RX ADMIN — Medication 50 MILLILITER(S): at 01:07

## 2019-01-01 RX ADMIN — Medication 1 VIAL(S): at 12:30

## 2019-01-01 RX ADMIN — Medication 0.5 MILLIGRAM(S): at 17:02

## 2019-01-01 RX ADMIN — Medication 10 MILLIGRAM(S): at 02:37

## 2019-01-01 RX ADMIN — HYDROMORPHONE HYDROCHLORIDE 0.2 MILLIGRAM(S): 2 INJECTION INTRAMUSCULAR; INTRAVENOUS; SUBCUTANEOUS at 16:36

## 2019-01-01 RX ADMIN — Medication 40 MILLIGRAM(S): at 11:32

## 2019-01-01 RX ADMIN — Medication 0.5 MILLIGRAM(S): at 22:37

## 2019-01-01 RX ADMIN — CHLORHEXIDINE GLUCONATE 1 APPLICATION(S): 213 SOLUTION TOPICAL at 18:02

## 2019-01-01 RX ADMIN — FENTANYL CITRATE 25 MICROGRAM(S): 50 INJECTION INTRAVENOUS at 11:15

## 2019-01-01 RX ADMIN — MEROPENEM 100 MILLIGRAM(S): 1 INJECTION INTRAVENOUS at 21:01

## 2019-01-01 RX ADMIN — POTASSIUM PHOSPHATE, MONOBASIC POTASSIUM PHOSPHATE, DIBASIC 62.5 MILLIMOLE(S): 236; 224 INJECTION, SOLUTION INTRAVENOUS at 02:05

## 2019-01-01 RX ADMIN — LEVETIRACETAM 400 MILLIGRAM(S): 250 TABLET, FILM COATED ORAL at 20:25

## 2019-01-01 RX ADMIN — CEFTRIAXONE 100 MILLIGRAM(S): 500 INJECTION, POWDER, FOR SOLUTION INTRAMUSCULAR; INTRAVENOUS at 11:00

## 2019-01-01 RX ADMIN — Medication 102 MILLIGRAM(S): at 21:02

## 2019-01-01 RX ADMIN — CHLORHEXIDINE GLUCONATE 1 APPLICATION(S): 213 SOLUTION TOPICAL at 05:29

## 2019-01-01 RX ADMIN — Medication 50 MILLILITER(S): at 21:01

## 2019-01-01 RX ADMIN — MEROPENEM 100 MILLIGRAM(S): 1 INJECTION INTRAVENOUS at 05:16

## 2019-01-01 RX ADMIN — Medication 200 GRAM(S): at 11:56

## 2019-01-01 RX ADMIN — MEROPENEM 100 MILLIGRAM(S): 1 INJECTION INTRAVENOUS at 14:40

## 2019-01-01 RX ADMIN — HYDROMORPHONE HYDROCHLORIDE 0.2 MILLIGRAM(S): 2 INJECTION INTRAMUSCULAR; INTRAVENOUS; SUBCUTANEOUS at 08:30

## 2019-01-01 RX ADMIN — Medication 5 MILLIGRAM(S): at 06:02

## 2019-01-01 RX ADMIN — ROBINUL 0.4 MILLIGRAM(S): 0.2 INJECTION INTRAMUSCULAR; INTRAVENOUS at 18:57

## 2019-01-01 RX ADMIN — Medication 3 MILLILITER(S): at 05:25

## 2019-01-01 RX ADMIN — Medication 3 MILLILITER(S): at 17:13

## 2019-01-01 RX ADMIN — Medication 1 MILLIGRAM(S): at 01:14

## 2019-01-01 RX ADMIN — Medication 1 MILLIGRAM(S): at 23:11

## 2019-01-01 RX ADMIN — Medication 100 MILLIEQUIVALENT(S): at 08:34

## 2019-01-01 RX ADMIN — PIPERACILLIN AND TAZOBACTAM 200 GRAM(S): 4; .5 INJECTION, POWDER, LYOPHILIZED, FOR SOLUTION INTRAVENOUS at 02:49

## 2019-01-01 RX ADMIN — Medication 0.5 MILLIGRAM(S): at 13:48

## 2019-01-01 RX ADMIN — Medication 3 MILLILITER(S): at 17:54

## 2019-01-01 RX ADMIN — HYDROMORPHONE HYDROCHLORIDE 0.5 MILLIGRAM(S): 2 INJECTION INTRAMUSCULAR; INTRAVENOUS; SUBCUTANEOUS at 11:45

## 2019-01-01 RX ADMIN — LEVETIRACETAM 400 MILLIGRAM(S): 250 TABLET, FILM COATED ORAL at 05:09

## 2019-01-01 RX ADMIN — Medication 200 GRAM(S): at 10:02

## 2019-01-01 RX ADMIN — Medication 0.5 MILLIGRAM(S): at 09:27

## 2019-01-01 RX ADMIN — Medication 200 GRAM(S): at 21:02

## 2019-01-01 RX ADMIN — HYDROMORPHONE HYDROCHLORIDE 0.2 MILLIGRAM(S): 2 INJECTION INTRAMUSCULAR; INTRAVENOUS; SUBCUTANEOUS at 05:35

## 2019-01-01 RX ADMIN — Medication 50 MILLILITER(S): at 12:23

## 2019-01-01 RX ADMIN — CHLORHEXIDINE GLUCONATE 1 APPLICATION(S): 213 SOLUTION TOPICAL at 05:01

## 2019-01-01 RX ADMIN — Medication 0.5 MILLIGRAM(S): at 21:46

## 2019-01-01 RX ADMIN — LEVETIRACETAM 400 MILLIGRAM(S): 250 TABLET, FILM COATED ORAL at 05:16

## 2019-01-01 RX ADMIN — Medication 50 MILLIEQUIVALENT(S): at 20:41

## 2019-01-01 RX ADMIN — LEVETIRACETAM 400 MILLIGRAM(S): 250 TABLET, FILM COATED ORAL at 17:02

## 2019-01-01 RX ADMIN — Medication 3 MILLILITER(S): at 11:14

## 2019-01-01 RX ADMIN — HYDROMORPHONE HYDROCHLORIDE 0.5 MILLIGRAM(S): 2 INJECTION INTRAMUSCULAR; INTRAVENOUS; SUBCUTANEOUS at 18:57

## 2019-01-01 RX ADMIN — Medication 0.5 MILLIGRAM(S): at 05:36

## 2019-01-01 RX ADMIN — LEVETIRACETAM 400 MILLIGRAM(S): 250 TABLET, FILM COATED ORAL at 06:06

## 2019-01-01 RX ADMIN — LEVETIRACETAM 400 MILLIGRAM(S): 250 TABLET, FILM COATED ORAL at 17:40

## 2019-01-01 RX ADMIN — Medication 80 MILLIGRAM(S): at 15:53

## 2019-01-01 RX ADMIN — CHLORHEXIDINE GLUCONATE 1 APPLICATION(S): 213 SOLUTION TOPICAL at 05:34

## 2019-01-01 RX ADMIN — Medication 3 MILLILITER(S): at 11:49

## 2019-01-01 RX ADMIN — DESMOPRESSIN ACETATE 220 MICROGRAM(S): 0.1 TABLET ORAL at 22:58

## 2019-01-01 RX ADMIN — Medication 3 MILLILITER(S): at 05:01

## 2019-01-01 RX ADMIN — ROBINUL 0.4 MILLIGRAM(S): 0.2 INJECTION INTRAMUSCULAR; INTRAVENOUS at 23:36

## 2019-01-01 RX ADMIN — Medication 3 MILLILITER(S): at 11:28

## 2019-01-01 RX ADMIN — LEVETIRACETAM 400 MILLIGRAM(S): 250 TABLET, FILM COATED ORAL at 09:06

## 2019-01-01 RX ADMIN — Medication 200 GRAM(S): at 01:33

## 2019-01-01 RX ADMIN — Medication 3 MILLILITER(S): at 17:33

## 2019-01-01 RX ADMIN — CHLORHEXIDINE GLUCONATE 1 APPLICATION(S): 213 SOLUTION TOPICAL at 06:06

## 2019-09-26 NOTE — ED PROVIDER NOTE - NEUROLOGICAL, MLM
Alert and oriented to person and year. 1/5 strength LUE. Slight left facial droop. Dysphagia Screen=Failed

## 2019-09-26 NOTE — ED PROVIDER NOTE - CRITICAL CARE PROVIDED
direct patient care (not related to procedure)/additional history taking/consult w/ pt's family directly relating to pts condition/interpretation of diagnostic studies/consultation with other physicians/conducted a detailed discussion of DNR status/telephone consultation with the patient's family/documentation

## 2019-09-26 NOTE — ED ADULT NURSE NOTE - OBJECTIVE STATEMENT
74 yrs old female with h/o htn , was transferred from [place] for subdural bleed. As per EMS report pt was found at [facility] unresponsive in her car. When EMS arrived pt was awake but confused and they had to give her instructions to open the car. Pt was taken to [facility] where  Ct. Scan of the head revealed a bilateral bleed. Upon assessment pt is Aox4 with short term memory loss. Pt has obvious weakness noted to left side  and report generalized body ache only when she moves. Pt also report she is being followed by oncology in [place] but cannot recall why.

## 2019-09-26 NOTE — ED ADULT NURSE REASSESSMENT NOTE - NS ED NURSE REASSESS COMMENT FT1
1unit of Platelets given. Consent in chart. Risks and benefits explained to patient. Patient verbalized understanding of risks and benefits. Patient aware of possible side effects. Vital signs stable. Second RN at bedside for confirmation. Patient a/ox3, confused at times, appears to be relaxed in stretcher with family at the bedside. Will continue to monitor and reassess.

## 2019-09-26 NOTE — ED ADULT NURSE NOTE - CHPI ED NUR SYMPTOMS NEG
no confusion/no fever/no vomiting/no dizziness/no change in level of consciousness/no blurred vision/no loss of consciousness/no nausea/no numbness

## 2019-09-26 NOTE — ED PROVIDER NOTE - CARE PLAN
Principal Discharge DX:	Subdural hematoma  Secondary Diagnosis:	Anemia, unspecified type Principal Discharge DX:	Subdural hematoma  Secondary Diagnosis:	Anemia, unspecified type  Secondary Diagnosis:	ADITHYA (acute kidney injury)

## 2019-09-26 NOTE — ED PROVIDER NOTE - ATTENDING CONTRIBUTION TO CARE
74 f W/ pmh OF htn and polycythemia vera, on ASA and hydroxyurea, p/w bilateral subdurals,   On labs from [place], pt has elevated WBC at 248, hgb of 6.9 and plts of 42,  Called pts hematologist Dr. West [phone] to discuss case,  no answer 74 f W/ pmh OF htn and myelodysplastic syndrome on ASA and hydroxyurea, seen by Dr. West ([phone]) and pts internist ([phone]) p/w bilateral subdurals, from [facility] after she was found by bystanders unresponsive w/ no report of a fall or trauma.   On labs from [place], pt has elevated WBC at 248, hgb of 6.9 and plts of 42,  Called pts hematologist Dr. West [phone] to discuss case,  reviewed labs of elevated WBC in the 200s and hgb of 7 w/ plts in the 40s, appears to be consistent w/ patients baseline. Pt does get intermittent phlebotomy 4 times a year for symptoms. Unknown when patients last aspirin use was.     Spoke w/ patients daughter ([phone]? unsure if this is correct number) who is a physician over the phone who just landed in [place] and will be flying back - discussed subdurals and NSG consult. Spoke w/ NSG and Dr. West as well as in house heme (Dr. Castro) regarding the patient. Agreed on platelet transfusion for platelets at 40 and ddavp for concominant aspirin usage. Additionally, patients son in law  at bedside, discussed code status w/ patient and she is a DNR.    Pt had micu consult for concern for blast crisis    1- Subdurals: pt had repeat scan, stable in size, gcs 15, aaox4 weakness on L leg>L arm; no acute surgical intervention  2- thrombocytopenia- obtaining a unit of platelets  3- leukocytosis- appears to be at baseline , if febrile would culture but pt is denying any fevers, no cough, no dysuria. will continue to monitor  4- prior to subdurals pt had an episode of unresponsiveness- unclear if seizure- neuro was consulted, recommending keppra load of 1g  5- consulted MICU for possible blast crisis and need for leukophresis, w/ stat heme consult, currently no imminent plans to start leukophoresis for symptoms as pt doesn't appear to have hyperviscosity per heme.

## 2019-09-26 NOTE — ED PROVIDER NOTE - CARE PLAN
Principal Discharge DX:	Subdural hematoma  Secondary Diagnosis:	Leukocytosis, unspecified type  Secondary Diagnosis:	Anemia, unspecified type  Secondary Diagnosis:	Platelets decreased Principal Discharge DX:	Subdural hematoma  Secondary Diagnosis:	Leukocytosis, unspecified type  Secondary Diagnosis:	Anemia, unspecified type  Secondary Diagnosis:	Platelets decreased  Secondary Diagnosis:	Acute pulmonary edema  Secondary Diagnosis:	Hypokalemia  Secondary Diagnosis:	Hypocalcemia

## 2019-09-26 NOTE — ED PROVIDER NOTE - CLINICAL SUMMARY MEDICAL DECISION MAKING FREE TEXT BOX
74f w unknown pmh BIBEMS as xfer from [place] for b/l subdural hematomas. Pt appears well, awake and alert. Neuro exam w LUE weakness otherwise nonfocal. From OSH, w b/l subdural + leukocytosis and thrombocytopenia. Will recheck labs and imaging, d/w nsgy, attempt to obtain more collateral info, r/a likely admit

## 2019-09-26 NOTE — CONSULT NOTE ADULT - SUBJECTIVE AND OBJECTIVE BOX
p ([id number])     HPI: 74F transfer from [place] PMx myelodysplastic syndrome on ASA (and hydroxyurea per daughter who is physician at [facility]) p/w bilateral acute SDH. Patient was apparently found unresponsive in her car. Labs from  notable for HgB 6.9, plt 42, . CT head shows acute SDH, 1cm on R, 3mm on L, no shift.    Exam:  AAOx3 with mild slowing, FC, 5/5 strength, no drift    --Anticoagulation:    =====================  PAST MEDICAL HISTORY     PAST SURGICAL HISTORY     Alcohol (Unknown)  shellfish (Unknown)      MEDICATIONS:  Antibiotics:    Neuro:    Other:      SOCIAL HISTORY:   Occupation:   Marital Status:     FAMILY HISTORY:      ROS: Negative except per HPI    LABS:

## 2019-09-26 NOTE — ED PROVIDER NOTE - OBJECTIVE STATEMENT
74f w unknown pmh BIBEMS as xfer from [place] for b/l subdural hematomas. Per EMS pt was found in her parked car not responding w visible oatmeal in her mouth by a bystander who called 911 when she would not respond to knock on window. On EMS arrival pt was awake and alert but confused. Took a while before she was able to understand how to open the door. On arrival at [place] was noted to have left-sided weakness. CT scan revealed b/l subdurals and was sent here w stable neuro exam. Pt herself denies complaints.    Other MRN 74f w unknown pmh BIBEMS as xfer from [place] for b/l subdural hematomas. Per EMS pt was found in her parked car not responding w visible oatmeal in her mouth by a bystander who called 911 when she would not respond to knock on window. On EMS arrival pt was awake and alert but confused. Took a while before she was able to understand how to open the door. On arrival at [place] was noted to have left-sided weakness. CT scan revealed b/l subdurals and was sent here w stable neuro exam. Pt herself denies complaints.

## 2019-09-26 NOTE — ED PROVIDER NOTE - SECONDARY DIAGNOSIS.
Leukocytosis, unspecified type Anemia, unspecified type Platelets decreased Hypokalemia Hypocalcemia ADITHYA (acute kidney injury) Acute pulmonary edema

## 2019-09-26 NOTE — CONSULT NOTE ADULT - SUBJECTIVE AND OBJECTIVE BOX
Clinical impression is most likely right hemispheric cerebrovascular accident.  What could be calculated from NIH stroke scale would be 6 .  The patient would not be a tPA candidate since,  symptoms started unknown time  IF CT HEAD SHOWS NO ICH THEN WORK UP WILL follow up labs   I would recommend telemetry evaluation to rule out underlying arrhythmia.  I would recommend echocardiogram to evaluate ejection fraction.  I would recommend carotid Doppler's to evaluate for carotid stenosis.  I would recommend MRI/MRA of the brain IF NO CONTRADICTIONS   I would recommend to check TSH,lipid panel  and A1C  Avoid hypotension, allow permissive hypertension, keep SBP above 150 and below 190 if possible if no bleed  I will start the patient on aspirin 325 mg once a day.  I will recommend cholesterol medications.  I will get Physical Therapy and Occupational Therapy.  I would recommend fall precautions.  I will continue to followup.

## 2019-09-26 NOTE — ED ADULT NURSE NOTE - CHPI ED NUR SYMPTOMS NEG
no nausea/no numbness/no blurred vision/no confusion/no fever/no vomiting/no dizziness/no loss of consciousness

## 2019-09-26 NOTE — ED PROVIDER NOTE - PROGRESS NOTE DETAILS
spoke w/ nsg phone call from pts internist, states pt has myeloproliferative disease, w/ essential thrombocytosis. Elizabeth Goldberger PGY-3: nsgy came to see pt, state hx sounds c/f possible seizure and requested getting neuro on board  In meantime pt w plt 40s, d/w nsgy - will give platelets and DDAVP Elizabeth Goldberger PGY-3: radiology called to report CT head stable from previous. Called nsgy to discuss, requesting admission to medical service. Still no recs from neuro so paged, awaiting callback, but likely plan to admit to medicine. Pt now c/o left lateral low back pain, unsure whether 2/2 lying flat. Examined affected area, no obvious deformities and neuro intact. Will treat pain phone call from pts internist, states pt has myeloproliferative disease, w/ essential thrombocytosis.  Reportedly w hx elevated wbc. Still given values will call heme Elizabeth Goldberger PGY-3: labs now w blasts 20%, pt w/o known hx leukemia. Called back heme to reeval case, as pt may require higher level of care. States not currently inhouse but will come in to see pt Elizabeth Goldberger PGY-3: also ordered TLS/DIC labs and IV hydration. Will hold off on transfusing prbc (Hb 7) as pt w such high WBC and possibly hyperviscous, pending final heme recs. Will also consult MICU given potential for blast crisis + numerous lab abnormalities Elizabeth Goldberger PGY-3: MICU consulted, will see pt Jonathan Weil, PGY3 - started bipap for tachypnea and hypoxia to mid-80s with fever as well. Treated fever, patient now more comfortable on bipap. Abx given empirically. MICU reassessed and still think she is appropriate for the floor.

## 2019-09-26 NOTE — ED ADULT NURSE NOTE - OBJECTIVE STATEMENT
73 y/o female comes in via EMS with complaints of confusion, left sided weakness after being found in her car. As per EMS they state they found her in her car outside of her hairdresser with oatmeal spilled in the car. Patient unable to recall what happened. Patient denies pain at this time. EKG obtained. Blood glucose obtained. Plan of care discussed with patient. Comfort and safety maintained. Will continue to monitor.

## 2019-09-26 NOTE — ED PROVIDER NOTE - MUSCULOSKELETAL, MLM
Spine appears normal, range of motion is not limited, no muscle or joint tenderness. Hips non tender

## 2019-09-26 NOTE — ED PROVIDER NOTE - OBJECTIVE STATEMENT
75 yo white female found sitting in car in local parking lot with oatmeal in moth and spilled on floor. Police called EMS and patient was transported here at which time, upon arrival, patient denied any symptoms and cannot provide any information what so ever.

## 2019-09-26 NOTE — ED ADULT NURSE NOTE - NSIMPLEMENTINTERV_GEN_ALL_ED
Implemented All Fall with Harm Risk Interventions:  [place] to call system. Call bell, personal items and telephone within reach. Instruct patient to call for assistance. Room bathroom lighting operational. Non-slip footwear when patient is off stretcher. Physically safe environment: no spills, clutter or unnecessary equipment. Stretcher in lowest position, wheels locked, appropriate side rails in place. Provide visual cue, wrist band, yellow gown, etc. Monitor gait and stability. Monitor for mental status changes and reorient to person, place, and time. Review medications for side effects contributing to fall risk. Reinforce activity limits and safety measures with patient and family. Provide visual clues: red socks.

## 2019-09-27 NOTE — H&P ADULT - PROBLEM SELECTOR PLAN 5
In setting of lactic acidosis, uremia, trace ketones  - delta delta of 2 suggestive of pure HAGMA In setting of lactic acidosis, uremia, trace ketones  - delta delta of 2  - bicarb downtrending  - renal c/s In setting of lactic acidosis, uremia, trace ketones  - delta delta of 2  - bicarb downtrending; per renal no need for bicarb at the moment, further recs pending.   - renal c/s, called in am ~7:55 am, pending recs.

## 2019-09-27 NOTE — CONSULT NOTE ADULT - SUBJECTIVE AND OBJECTIVE BOX
# Acute leukemia   # History of MPD         -Admit to floor by hospitalist (No need leukophoresis at this moment)  -Start hydroxyurea 2g BID  -Start allopurinol 150mg daily (stat now and start tomorrow morning)  -Start IVF@150cc/hr  -Please send fibrinogen, LDH, HIV, HCV antibody, HBVc antibody, HBVs antigen  -Check CMP, P, uric acid, Mg, LDH BID for now  -Check CBC at least daily for now  -Echo or MUGA   -Hematology consultation team will continue to follow  -Will send peripheral flow cytometry (Need one green top) Hematology/Oncology Consult Note    HPI:  74 year old female with history of MPD on HU and ASA followed by Dr. Moon at [facility] was brought to [place] ED by EMS after she had syncope and was found to have SDH for which she was transferred to [facility] for further care. At ED, her WBC was 238 with 20% blasts and hematology was consulted for possible leukemia.     She reports that she had epistaxis last week and stopped taking HU and ASA at that time. Otherwise, she denies fever, weight change, appetite change, night sweats.     PAST MEDICAL & SURGICAL HISTORY:  Spleen enlarged  MPD    FAMILY HISTORY:  No cancer history in her family    MEDICATIONS  (STANDING):  lidocaine   Patch 1 Patch Transdermal Once  sodium chloride 0.9% Bolus 1000 milliLiter(s) IV Bolus once      Allergies  Alcohol (Unknown)  No Known Drug Allergies  shellfish (Unknown)    SOCIAL HISTORY:   EtOH: None  Tobacco: Quit 40 years ago. Smokes only occasionally    REVIEW OF SYSTEMS:  General: POSITIVE FOR fatigue. Negative for chills, fever, night sweats, unintentional weight change.  Head: Negative for HA  Eyes: Negative for vision changes, diplopia, impaired vision.  ENT: Negative for tinnitus, vertigo, hearing impairment, postnasal drip, sore throat.  Respiratory: Negative for shortness of breath, cough, sputum.  Cardiovascular: Negative for chest pain, dyspnea on exertion.  Gastrointestinal: Negative for dysphagia, nausea, vomiting, hematemesis, abdominal pain, diarrhea, constipation, hematochezia, tarry stool.  MSK: Negative for myalgia, joint pain, neck stiffness, weakness POSITIVE FOR lowqer back pain.  Neuro: Negative for weakness, loss of balance.   Skin: Negative for rash, erythema.  Psych: Negative for anxiety, depression, sleep disturbance.    VITAL SIGNS:  Height (cm): 160.02 (09-26 @ 18:38), 63 (09-26 @ 17:46)  Weight (kg): 52.2 (09-26 @ 18:38), 52.2 (09-26 @ 16:16)  BMI (kg/m2): 20.4 (09-26 @ 18:38), 131.5 (09-26 @ 17:46)  BSA (m2): 1.53 (09-26 @ 18:38), 0.78 (09-26 @ 17:46)  T(F): 98.6 (09-26-19 @ 22:50), Max: 98.6 (09-26-19 @ 22:50)  HR: 110 (09-26-19 @ 22:50)  BP: 112/67 (09-26-19 @ 22:50)  RR: 20 (09-26-19 @ 22:50)  SpO2: 96% (09-26-19 @ 22:50)  Wt(kg): --    PHYSICAL EXAMINATION:  Constitutional: NAD   Eyes: PERRLA, EOMI, sclera non-icteric, conjunctiva non-anemia  Neck: supple, no masses, no elevated JVP  Mouth Dry oral mucosa.   Respiratory: CTA b/l  Cardiovascular: Normal rate regular rhythm. normal S1S2, no murmur  Gastrointestinal: soft and flat, no tenderness to palpation, normoactive bowel sound, +splenomegaly, + petechiae  Extremities:  No c/c/e  MSK: No joint swelling, erythema, or tenderness   Neurological: AOx3, Cranial nerves II-XII grossly intact  Skin: No rash  Psych: Normal affect    LABS:                        7.0    238.0 )-----------( 43       ( 26 Sep 2019 19:05 )             23.8     09-26    145  |  98  |  98<H>  ----------------------------<  103<H>  2.5<LL>   |  17<L>  |  2.79<H>    Ca    6.2<LL>      26 Sep 2019 19:05  Phos  7.4     09-26  Mg     1.4     09-26    TPro  7.6  /  Alb  4.2  /  TBili  0.4  /  DBili  x   /  AST  75<H>  /  ALT  11  /  AlkPhos  97  09-26    PT/INR - ( 26 Sep 2019 19:05 )   PT: 13.2 sec;   INR: 1.14 ratio         PTT - ( 26 Sep 2019 19:05 )  PTT:24.0 sec Magnesium, Serum: 1.4 mg/dL (09-26 @ 19:06)  Phosphorus Level, Serum: 7.4 mg/dL (09-26 @ 19:06)        RADIOLOGY & ADDITIONAL TESTS:  < from: CT Head No Cont (09.26.19 @ 21:26) >    FINDINGS: There is stable appearance of the right > left holohemispheric   subdural hematomas with minimal mass effectand minimal leftward midline   shift (0.3 cm). Small falcine subdural hematoma is unchanged. There is no   large cortical infarct. Nonspecific mild periventricular and subcortical   white matter lucencies likely represent chronic microvascular ischemic   changes. There is mild to moderate cerebral volume loss with commensurate   ventricular dilatation. There is a partially empty sella.    There is no depressed skull fracture. There is minimal mucosal thickening   of the sinuses. The tympanomastoid region is clear.      IMPRESSION:     Stable appearance of the right > left holohemispheric subdural hematomas   and falcine subdural hematoma.

## 2019-09-27 NOTE — H&P ADULT - HISTORY OF PRESENT ILLNESS
74y F w/ PMH MDS transfer from [place] for b/l subdural hematomas. Patient says she doesn’t know what was happening earlier today and how she ended up in the hospital. Patient is AO to self, hospital, and September. Patient states she feels “shitty”. History obtained per chart review as patient is not cooperative with history, not responding to questions, fixated on discomfort from minimal contact from ongoing interventions (placement of leon, bedside ultrasound, bipap, blood draws, etc). Patient remembered going to [email or web address] to get oatmeal then remembers being the hospital. Per EMS pt was found in her parked car not responding w visible oatmeal in her mouth by a bystander who called 911 when she would not respond to knock on window. On EMS arrival pt was awake and alert but confused. Took a while before she was able to understand how to open the door. On arrival at [place] was noted to have left-sided weakness. Patient had foaming in the mouth. Patient was confused for several hours. No tongue laceration or urinary incontinence. CT scan revealed b/l subdurals and was sent here w stable neuro exam. Patient had returned to the baseline mental status per son but on repeat encounter in ED by MICU, patient seemed to be waxing and waning. Reported that patient had epistaxis last week and stopped taking hydroxyurea and aspirin at that time. Denied fevers, chills, headache, weakness, numbness, vision change, photophobia, neck stiffness, cp, sob, abd pain, n/v/d, dysuria. ED course c/b hypoxemia to 83% on 5L NC, tachycardia to 122, tachypnea to 30s and temporarily placed on bipap. Now sating 95% on 4L. 74y F w/ PMH MDS transfer from [place] for b/l subdural hematomas. Patient says she doesn’t know what was happening earlier today and how she ended up in the hospital. Patient is AO to self, hospital, and September. Patient states she feels “shitty”. History obtained per chart review as patient is not cooperative with history, not responding to questions, fixated on discomfort from minimal contact from ongoing interventions (placement of leon, bedside ultrasound, bipap, blood draws, etc), no family at bedside, and unable to reach family via available emergency contact. Patient remembered going to [employer] to get oatmeal then remembers being the hospital. Per EMS pt was found in her parked car not responding w visible oatmeal in her mouth by a bystander who called 911 when she would not respond to knock on window. On EMS arrival pt was awake and alert but confused. Took a while before she was able to understand how to open the door. On arrival at [place] was noted to have left-sided weakness. Patient had foaming in the mouth. Patient was confused for several hours. No tongue laceration or urinary incontinence. CT scan revealed b/l subdurals and was sent here w stable neuro exam. Patient had returned to the baseline mental status per son but on repeat encounter in ED by MICU, patient seemed to be waxing and waning. Reported that patient had epistaxis last week and stopped taking hydroxyurea and aspirin at that time. Denied fevers, chills, headache, weakness, numbness, vision change, photophobia, neck stiffness, cp, sob, abd pain, n/v/d, dysuria. ED course c/b hypoxemia to 83% on 5L NC, tachycardia to 122, tachypnea to 30s and temporarily placed on bipap. Now sating 95% on 4L. 74y F w/ PMH MDS transfer from [place] for b/l subdural hematomas. Patient says she doesn’t know what was happening earlier today and how she ended up in the hospital. Patient is AO to self, hospital, and September. Patient states she feels “shitty”. History obtained per chart review as patient is not cooperative with history, not responding to questions, fixated on discomfort from minimal contact from ongoing interventions (placement of leon, bedside ultrasound, bipap, blood draws, etc), no family at bedside, and unable to reach family via available emergency contact. Patient remembered going to [place] to get oatmeal then remembers being the hospital. Per EMS pt was found in her parked car not responding w visible oatmeal in her mouth by a bystander who called 911 when she would not respond to knock on window. On EMS arrival pt was awake and alert but confused. Took a while before she was able to understand how to open the door. On arrival at [place] was noted to have left-sided weakness. Patient had foaming in the mouth. Patient was confused for several hours. No tongue laceration or urinary incontinence. CT scan revealed b/l subdurals and was sent here w stable neuro exam. Patient had returned to the baseline mental status per son but on repeat encounter in ED by MICU, patient seemed to be waxing and waning. Reported that patient had epistaxis last week and stopped taking hydroxyurea and aspirin at that time. Denied fevers, chills, headache, weakness, numbness, vision change, photophobia, neck stiffness, cp, sob, abd pain, n/v/d, dysuria. ED course c/b Tm 101, hypoxemia to 83%, tachycardia to 122, tachypnea to 30s and temporarily placed on bipap. Now sating 95% on 4L.

## 2019-09-27 NOTE — H&P ADULT - NSHPREVIEWOFSYSTEMS_GEN_ALL_CORE
Per chart review:  General: No fevers, chills  HEENT: No headaches, acute visual changes, neck stiffness  CVS: No chest pain  Resp: No cough, dyspnea  GI: No abdominal pain, nausea, vomiting, diarrhea  : No dysuria  MSK: unable to assess given clinical status  Ext: unable to assess given clinical status  Skin: unable to assess given clinical status  Heme: unable to assess given clinical status  Neuro: + loss of consciousness, confusion; no numbness, weakness  Endocrine: unable to assess given clinical status

## 2019-09-27 NOTE — CHART NOTE - NSCHARTNOTEFT_GEN_A_CORE
MICU Accept Note    CHIEF COMPLAINT:     HPI / INTERVAL HISTORY:    PAST MEDICAL & SURGICAL HISTORY:  Spleen enlarged  MDS (myelodysplastic syndrome)  No significant past surgical history      FAMILY HISTORY:  Family history of CVA: mother      SOCIAL HISTORY:  Smoking: [  ] Never Smoked  [  ] Former Smoker (# packs x # years)  [  ] Current Smoker (# packs x # years)  Substance Use:   EtOH Use:   Marital Status: [  ] Single  [  ]   [  ]   [  ]   Sexual History:   Occupation:  Recent Travel:  Country of Birth:   Advance Directives:     HOME MEDICATIONS:      Allergies    Alcohol (Unknown)  No Known Drug Allergies  shellfish (Unknown)    Intolerances          REVIEW OF SYSTEMS:  No acute complaints     OBJECTIVE:  ICU Vital Signs Last 24 Hrs  T(C): 36.8 (27 Sep 2019 09:02), Max: 38.4 (27 Sep 2019 01:31)  T(F): 98.3 (27 Sep 2019 09:02), Max: 101.2 (27 Sep 2019 01:31)  HR: 104 (27 Sep 2019 09:02) (101 - 122)  BP: 103/57 (27 Sep 2019 09:02) (101/60 - 135/91)  BP(mean): --  ABP: --  ABP(mean): --  RR: 16 (27 Sep 2019 09:02) (14 - 20)  SpO2: 100% (27 Sep 2019 09:02) (92% - 100%)        CAPILLARY BLOOD GLUCOSE      POCT Blood Glucose.: 163 mg/dL (26 Sep 2019 16:32)      PHYSICAL EXAM:  General:   HEENT:   Neck:   Chest/Lungs:  Heart:  Abdomen:   Extremities:   Skin:   Neuro:   Psych:     LINES:     HOSPITAL MEDICATIONS:  MEDICATIONS  (STANDING):  allopurinol 150 milliGRAM(s) Oral Once  calcium acetate 1334 milliGRAM(s) Oral four times a day with meals  calcium gluconate IVPB 2 Gram(s) IV Intermittent once  chlorhexidine 4% Liquid 1 Application(s) Topical <User Schedule>  CRRT Treatment    <Continuous>  hydroxyurea 2000 milliGRAM(s) Oral two times a day  lactated ringers. 1000 milliLiter(s) (100 mL/Hr) IV Continuous <Continuous>  levETIRAcetam 750 milliGRAM(s) Oral two times a day  piperacillin/tazobactam IVPB.. 3.375 Gram(s) IV Intermittent every 12 hours  PrismaSATE Dialysate BGK 4 / 2.5 5000 milliLiter(s) (4000 mL/Hr) CRRT <Continuous>  PrismaSOL Filtration BGK 0 / 2.5 5000 milliLiter(s) (1800 mL/Hr) CRRT <Continuous>  PrismaSOL Filtration BGK 0 / 2.5 5000 milliLiter(s) (200 mL/Hr) CRRT <Continuous>  rasburicase IVPB 6 milliGRAM(s) IV Intermittent once    MEDICATIONS  (PRN):  acetaminophen   Tablet .. 650 milliGRAM(s) Oral every 6 hours PRN Temp greater or equal to 38C (100.4F), Mild Pain (1 - 3)  ondansetron Injectable 4 milliGRAM(s) IV Push every 6 hours PRN Nausea      LABS:                        7.0    266.0 )-----------( 102      ( 27 Sep 2019 00:22 )             22.4     Hgb Trend: 7.0<--, 7.0<--, 6.9<--      144  |  100  |  95<H>  ----------------------------<  103<H>  3.4<L>   |  16<L>  |  2.70<H>    Ca    7.4<L>      27 Sep 2019 00:20  Phos  6.9       Mg     1.4         TPro  7.6  /  Alb  4.4  /  TBili  0.4  /  DBili  x   /  AST  87<H>  /  ALT  13  /  AlkPhos  94      Creatinine Trend: 2.70<--, 2.79<--, 2.80<--  PT/INR - ( 26 Sep 2019 19:05 )   PT: 13.2 sec;   INR: 1.14 ratio         PTT - ( 26 Sep 2019 19:05 )  PTT:24.0 sec  Urinalysis Basic - ( 27 Sep 2019 01:46 )    Color: Yellow / Appearance: Slightly Turbid / S.018 / pH: x  Gluc: x / Ketone: Trace  / Bili: Negative / Urobili: Negative   Blood: x / Protein: 100 / Nitrite: Negative   Leuk Esterase: Moderate / RBC: 6-10 /hpf / WBC >50   Sq Epi: x / Non Sq Epi: Few / Bacteria: Many      Arterial Blood Gas:   @ 04:55  7.37/24/58/13/86/-10.7  ABG lactate: --        MICROBIOLOGY:     RADIOLOGY & ADDITIONAL TESTS: MICU Accept Note    CHIEF COMPLAINT: CVVHD for tumor lysis syndrome and hyperkalemia    HPI / INTERVAL HISTORY: As per chart review,   74y F w/ PMH MDS transfer from [place] for b/l subdural hematomas. Patient says she doesn’t know what was happening earlier today and how she ended up in the hospital. Patient is AO to [facility], hospital, and [place]. Patient states she feels “shitty”. History obtained per chart review as patient is not cooperative with history, not responding to questions, fixated on discomfort from minimal contact from ongoing interventions (placement of leon, bedside ultrasound, bipap, blood draws, etc), no family at bedside, and unable to reach family via available emergency contact. Patient remembered going to [place] to get oatmeal then remembers being the hospital. Per EMS pt was found in her parked car not responding w visible oatmeal in her mouth by a bystander who called 911 when she would not respond to knock on window. On EMS arrival pt was awake and alert but confused. Took a while before she was able to understand how to open the door. On arrival at [place] was noted to have left-sided weakness. Patient had foaming in the mouth. Patient was confused for several hours. No tongue laceration or urinary incontinence. CT scan revealed b/l subdurals and was sent here w stable neuro exam. Patient had returned to the baseline mental status per son but on repeat encounter in ED by MICU, patient seemed to be waxing and waning. Reported that patient had epistaxis last week and stopped taking hydroxyurea and aspirin at that time. Denied fevers, chills, headache, weakness, numbness, vision change, photophobia, neck stiffness, cp, sob, abd pain, n/v/d, dysuria. ED course c/b Tm 101, hypoxemia to 83%, tachycardia to 122, tachypnea to 30s and temporarily placed on bipap. Now sating 95% on 4L.    PAST MEDICAL & SURGICAL HISTORY:  Spleen enlarged  MDS (myelodysplastic syndrome)  No significant past surgical history      FAMILY HISTORY:  Family history of CVA: mother      HOME MEDICATIONS: Hydrea, ASA, Metoprolol      Allergies:   Alcohol (Unknown)  No Known Drug Allergies  shellfish (Unknown)    REVIEW OF SYSTEMS: Unable to assess. Pt lethargic.     OBJECTIVE:  ICU Vital Signs Last 24 Hrs  T(C): 36.8 (27 Sep 2019 09:02), Max: 38.4 (27 Sep 2019 01:31)  T(F): 98.3 (27 Sep 2019 09:02), Max: 101.2 (27 Sep 2019 01:31)  HR: 104 (27 Sep 2019 09:02) (101 - 122)  BP: 103/57 (27 Sep 2019 09:02) (101/60 - 135/91)  BP(mean): --  ABP: --  ABP(mean): --  RR: 16 (27 Sep 2019 09:02) (14 - 20)  SpO2: 100% (27 Sep 2019 09:02) (92% - 100%)        CAPILLARY BLOOD GLUCOSE      POCT Blood Glucose.: 163 mg/dL (26 Sep 2019 16:32)      PHYSICAL EXAM:    Constitutional: NAD  HEENT: sclera non-icteric, MMM  Neck: supple  Respiratory: basal rales, with normal respiratory effort and no intercostal retractions  Cardiovascular: RRR, normal S1S2, no M/R/G  Gastrointestinal: soft, NTND, BS normal in all four quadrants  Extremities:  no peripheral edema  Neurological: AAOx2-3  Skin: Normal temperature    LINES:     HOSPITAL MEDICATIONS:  MEDICATIONS  (STANDING):  allopurinol 150 milliGRAM(s) Oral Once  calcium acetate 1334 milliGRAM(s) Oral four times a day with meals  calcium gluconate IVPB 2 Gram(s) IV Intermittent once  chlorhexidine 4% Liquid 1 Application(s) Topical <User Schedule>  CRRT Treatment    <Continuous>  hydroxyurea 2000 milliGRAM(s) Oral two times a day  lactated ringers. 1000 milliLiter(s) (100 mL/Hr) IV Continuous <Continuous>  levETIRAcetam 750 milliGRAM(s) Oral two times a day  piperacillin/tazobactam IVPB.. 3.375 Gram(s) IV Intermittent every 12 hours  PrismaSATE Dialysate BGK 4 / 2.5 5000 milliLiter(s) (4000 mL/Hr) CRRT <Continuous>  PrismaSOL Filtration BGK 0 / 2.5 5000 milliLiter(s) (1800 mL/Hr) CRRT <Continuous>  PrismaSOL Filtration BGK 0 / 2.5 5000 milliLiter(s) (200 mL/Hr) CRRT <Continuous>  rasburicase IVPB 6 milliGRAM(s) IV Intermittent once    MEDICATIONS  (PRN):  acetaminophen   Tablet .. 650 milliGRAM(s) Oral every 6 hours PRN Temp greater or equal to 38C (100.4F), Mild Pain (1 - 3)  ondansetron Injectable 4 milliGRAM(s) IV Push every 6 hours PRN Nausea      LABS:                        7.0    266.0 )-----------( 102      ( 27 Sep 2019 00:22 )             22.4     Hgb Trend: 7.0<--, 7.0<--, 6.9<--      144  |  100  |  95<H>  ----------------------------<  103<H>  3.4<L>   |  16<L>  |  2.70<H>    Ca    7.4<L>      27 Sep 2019 00:20  Phos  6.9       Mg     1.4         TPro  7.6  /  Alb  4.4  /  TBili  0.4  /  DBili  x   /  AST  87<H>  /  ALT  13  /  AlkPhos  94      Creatinine Trend: 2.70<--, 2.79<--, 2.80<--  PT/INR - ( 26 Sep 2019 19:05 )   PT: 13.2 sec;   INR: 1.14 ratio         PTT - ( 26 Sep 2019 19:05 )  PTT:24.0 sec  Urinalysis Basic - ( 27 Sep 2019 01:46 )    Color: Yellow / Appearance: Slightly Turbid / S.018 / pH: x  Gluc: x / Ketone: Trace  / Bili: Negative / Urobili: Negative   Blood: x / Protein: 100 / Nitrite: Negative   Leuk Esterase: Moderate / RBC: 6-10 /hpf / WBC >50   Sq Epi: x / Non Sq Epi: Few / Bacteria: Many      Arterial Blood Gas:   @ 04:55  7.37/24/58/13/86/-10.7  ABG lactate: --    MICROBIOLOGY:     RADIOLOGY & ADDITIONAL TESTS: MICU Accept Note    CHIEF COMPLAINT: CVVHD for tumor lysis syndrome and hyperkalemia    HPI / INTERVAL HISTORY: As per chart review,   74y F w/ PMH MDS transfer from [place] for b/l subdural hematomas. Patient says she doesn’t know what was happening earlier today and how she ended up in the hospital. Patient is AO to [facility], hospital, and [place]. Patient states she feels “shitty”. History obtained per chart review as patient is not cooperative with history, not responding to questions, fixated on discomfort from minimal contact from ongoing interventions (placement of leon, bedside ultrasound, bipap, blood draws, etc), no family at bedside, and unable to reach family via available emergency contact. Patient remembered going to [employer] to get oatmeal then remembers being the hospital. Per EMS pt was found in her parked car not responding w visible oatmeal in her mouth by a bystander who called 911 when she would not respond to knock on window. On EMS arrival pt was awake and alert but confused. Took a while before she was able to understand how to open the door. On arrival at [place] was noted to have left-sided weakness. Patient had foaming in the mouth. Patient was confused for several hours. No tongue laceration or urinary incontinence. CT scan revealed b/l subdurals and was sent here w stable neuro exam. Patient had returned to the baseline mental status per son but on repeat encounter in ED by MICU, patient seemed to be waxing and waning. Reported that patient had epistaxis last week and stopped taking hydroxyurea and aspirin at that time. Denied fevers, chills, headache, weakness, numbness, vision change, photophobia, neck stiffness, cp, sob, abd pain, n/v/d, dysuria. ED course c/b Tm 101, hypoxemia to 83%, tachycardia to 122, tachypnea to 30s and temporarily placed on bipap. Now sating 95% on 4L.    PAST MEDICAL & SURGICAL HISTORY:  Spleen enlarged  MDS (myelodysplastic syndrome)  No significant past surgical history      FAMILY HISTORY:  Family history of CVA: mother      HOME MEDICATIONS: Hydrea, ASA, Metoprolol      Allergies:   Alcohol (Unknown)  No Known Drug Allergies  shellfish (Unknown)    REVIEW OF SYSTEMS: Unable to assess. Pt lethargic.     OBJECTIVE:  ICU Vital Signs Last 24 Hrs  T(C): 36.8 (27 Sep 2019 09:02), Max: 38.4 (27 Sep 2019 01:31)  T(F): 98.3 (27 Sep 2019 09:02), Max: 101.2 (27 Sep 2019 01:31)  HR: 104 (27 Sep 2019 09:02) (101 - 122)  BP: 103/57 (27 Sep 2019 09:02) (101/60 - 135/91)  BP(mean): --  ABP: --  ABP(mean): --  RR: 16 (27 Sep 2019 09:02) (14 - 20)  SpO2: 100% (27 Sep 2019 09:02) (92% - 100%)        CAPILLARY BLOOD GLUCOSE      POCT Blood Glucose.: 163 mg/dL (26 Sep 2019 16:32)      PHYSICAL EXAM:    Constitutional: NAD  HEENT: sclera non-icteric, MMM  Neck: supple  Respiratory: basal rales, with normal respiratory effort and no intercostal retractions  Cardiovascular: RRR, normal S1S2, no M/R/G  Gastrointestinal: soft, NTND, BS normal in all four quadrants  Extremities:  no peripheral edema  Neurological: AAOx2-3  Skin: Normal temperature    LINES:     HOSPITAL MEDICATIONS:  MEDICATIONS  (STANDING):  allopurinol 150 milliGRAM(s) Oral Once  calcium acetate 1334 milliGRAM(s) Oral four times a day with meals  calcium gluconate IVPB 2 Gram(s) IV Intermittent once  chlorhexidine 4% Liquid 1 Application(s) Topical <User Schedule>  CRRT Treatment    <Continuous>  hydroxyurea 2000 milliGRAM(s) Oral two times a day  lactated ringers. 1000 milliLiter(s) (100 mL/Hr) IV Continuous <Continuous>  levETIRAcetam 750 milliGRAM(s) Oral two times a day  piperacillin/tazobactam IVPB.. 3.375 Gram(s) IV Intermittent every 12 hours  PrismaSATE Dialysate BGK 4 / 2.5 5000 milliLiter(s) (4000 mL/Hr) CRRT <Continuous>  PrismaSOL Filtration BGK 0 / 2.5 5000 milliLiter(s) (1800 mL/Hr) CRRT <Continuous>  PrismaSOL Filtration BGK 0 / 2.5 5000 milliLiter(s) (200 mL/Hr) CRRT <Continuous>  rasburicase IVPB 6 milliGRAM(s) IV Intermittent once    MEDICATIONS  (PRN):  acetaminophen   Tablet .. 650 milliGRAM(s) Oral every 6 hours PRN Temp greater or equal to 38C (100.4F), Mild Pain (1 - 3)  ondansetron Injectable 4 milliGRAM(s) IV Push every 6 hours PRN Nausea      LABS:                        7.0    266.0 )-----------( 102      ( 27 Sep 2019 00:22 )             22.4     Hgb Trend: 7.0<--, 7.0<--, 6.9<--      144  |  100  |  95<H>  ----------------------------<  103<H>  3.4<L>   |  16<L>  |  2.70<H>    Ca    7.4<L>      27 Sep 2019 00:20  Phos  6.9       Mg     1.4         TPro  7.6  /  Alb  4.4  /  TBili  0.4  /  DBili  x   /  AST  87<H>  /  ALT  13  /  AlkPhos  94      Creatinine Trend: 2.70<--, 2.79<--, 2.80<--  PT/INR - ( 26 Sep 2019 19:05 )   PT: 13.2 sec;   INR: 1.14 ratio         PTT - ( 26 Sep 2019 19:05 )  PTT:24.0 sec  Urinalysis Basic - ( 27 Sep 2019 01:46 )    Color: Yellow / Appearance: Slightly Turbid / S.018 / pH: x  Gluc: x / Ketone: Trace  / Bili: Negative / Urobili: Negative   Blood: x / Protein: 100 / Nitrite: Negative   Leuk Esterase: Moderate / RBC: 6-10 /hpf / WBC >50   Sq Epi: x / Non Sq Epi: Few / Bacteria: Many      Arterial Blood Gas:   @ 04:55  7.37/24/58/13/86/-10.7  ABG lactate: --    MICROBIOLOGY:     RADIOLOGY & ADDITIONAL TESTS:    ASSESSMENT AND PLAN:  74F PMH MDS transferred from [place] with BL subdural hematomas, 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    #NEURO  BL subdural hematomas  - no plan for surgical intervention  -    #RESP    #CV    #ID        # MICU Accept Note    CHIEF COMPLAINT: CVVHD for tumor lysis syndrome and hyperkalemia    HPI / INTERVAL HISTORY: As per chart review,   74y F w/ PMH MDS transfer from [place] for b/l subdural hematomas. Patient says she doesn’t know what was happening earlier today and how she ended up in the hospital. Patient is AO to [facility], hospital, and [place]. Patient states she feels “shitty”. History obtained per chart review as patient is not cooperative with history, not responding to questions, fixated on discomfort from minimal contact from ongoing interventions (placement of leon, bedside ultrasound, bipap, blood draws, etc), no family at bedside, and unable to reach family via available emergency contact. Patient remembered going to [place] to get oatmeal then remembers being the hospital. Per EMS pt was found in her parked car not responding w visible oatmeal in her mouth by a bystander who called 911 when she would not respond to knock on window. On EMS arrival pt was awake and alert but confused. Took a while before she was able to understand how to open the door. On arrival at [place] was noted to have left-sided weakness. Patient had foaming in the mouth. Patient was confused for several hours. No tongue laceration or urinary incontinence. CT scan revealed b/l subdurals and was sent here w stable neuro exam. Patient had returned to the baseline mental status per son but on repeat encounter in ED by MICU, patient seemed to be waxing and waning. Reported that patient had epistaxis last week and stopped taking hydroxyurea and aspirin at that time. Denied fevers, chills, headache, weakness, numbness, vision change, photophobia, neck stiffness, cp, sob, abd pain, n/v/d, dysuria. ED course c/b Tm 101, hypoxemia to 83%, tachycardia to 122, tachypnea to 30s and temporarily placed on bipap. Now sating 95% on 4L.    PAST MEDICAL & SURGICAL HISTORY:  Spleen enlarged  MDS (myelodysplastic syndrome)  No significant past surgical history      FAMILY HISTORY:  Family history of CVA: mother      HOME MEDICATIONS: Hydrea, ASA, Metoprolol      Allergies:   Alcohol (Unknown)  No Known Drug Allergies  shellfish (Unknown)    REVIEW OF SYSTEMS: Unable to assess. Pt lethargic.     OBJECTIVE:  ICU Vital Signs Last 24 Hrs  T(C): 36.8 (27 Sep 2019 09:02), Max: 38.4 (27 Sep 2019 01:31)  T(F): 98.3 (27 Sep 2019 09:02), Max: 101.2 (27 Sep 2019 01:31)  HR: 104 (27 Sep 2019 09:02) (101 - 122)  BP: 103/57 (27 Sep 2019 09:02) (101/60 - 135/91)  BP(mean): --  ABP: --  ABP(mean): --  RR: 16 (27 Sep 2019 09:02) (14 - 20)  SpO2: 100% (27 Sep 2019 09:02) (92% - 100%)        CAPILLARY BLOOD GLUCOSE      POCT Blood Glucose.: 163 mg/dL (26 Sep 2019 16:32)      PHYSICAL EXAM:    Constitutional: NAD  HEENT: sclera non-icteric, MMM  Neck: supple  Respiratory: basal rales, with normal respiratory effort and no intercostal retractions  Cardiovascular: RRR, normal S1S2, no M/R/G  Gastrointestinal: soft, NTND, BS normal in all four quadrants  Extremities:  no peripheral edema  Neurological: AAOx2-3  Skin: Normal temperature    LINES:     HOSPITAL MEDICATIONS:  MEDICATIONS  (STANDING):  allopurinol 150 milliGRAM(s) Oral Once  calcium acetate 1334 milliGRAM(s) Oral four times a day with meals  calcium gluconate IVPB 2 Gram(s) IV Intermittent once  chlorhexidine 4% Liquid 1 Application(s) Topical <User Schedule>  CRRT Treatment    <Continuous>  hydroxyurea 2000 milliGRAM(s) Oral two times a day  lactated ringers. 1000 milliLiter(s) (100 mL/Hr) IV Continuous <Continuous>  levETIRAcetam 750 milliGRAM(s) Oral two times a day  piperacillin/tazobactam IVPB.. 3.375 Gram(s) IV Intermittent every 12 hours  PrismaSATE Dialysate BGK 4 / 2.5 5000 milliLiter(s) (4000 mL/Hr) CRRT <Continuous>  PrismaSOL Filtration BGK 0 / 2.5 5000 milliLiter(s) (1800 mL/Hr) CRRT <Continuous>  PrismaSOL Filtration BGK 0 / 2.5 5000 milliLiter(s) (200 mL/Hr) CRRT <Continuous>  rasburicase IVPB 6 milliGRAM(s) IV Intermittent once    MEDICATIONS  (PRN):  acetaminophen   Tablet .. 650 milliGRAM(s) Oral every 6 hours PRN Temp greater or equal to 38C (100.4F), Mild Pain (1 - 3)  ondansetron Injectable 4 milliGRAM(s) IV Push every 6 hours PRN Nausea      LABS:                        7.0    266.0 )-----------( 102      ( 27 Sep 2019 00:22 )             22.4     Hgb Trend: 7.0<--, 7.0<--, 6.9<--      144  |  100  |  95<H>  ----------------------------<  103<H>  3.4<L>   |  16<L>  |  2.70<H>    Ca    7.4<L>      27 Sep 2019 00:20  Phos  6.9       Mg     1.4         TPro  7.6  /  Alb  4.4  /  TBili  0.4  /  DBili  x   /  AST  87<H>  /  ALT  13  /  AlkPhos  94      Creatinine Trend: 2.70<--, 2.79<--, 2.80<--  PT/INR - ( 26 Sep 2019 19:05 )   PT: 13.2 sec;   INR: 1.14 ratio         PTT - ( 26 Sep 2019 19:05 )  PTT:24.0 sec  Urinalysis Basic - ( 27 Sep 2019 01:46 )    Color: Yellow / Appearance: Slightly Turbid / S.018 / pH: x  Gluc: x / Ketone: Trace  / Bili: Negative / Urobili: Negative   Blood: x / Protein: 100 / Nitrite: Negative   Leuk Esterase: Moderate / RBC: 6-10 /hpf / WBC >50   Sq Epi: x / Non Sq Epi: Few / Bacteria: Many      Arterial Blood Gas:   @ 04:55  7.37/24/58/13/86/-10.7  ABG lactate: --    MICROBIOLOGY:     RADIOLOGY & ADDITIONAL TESTS:    ASSESSMENT AND PLAN:  74F PMH MDS transferred from [place] with BL subdural hematomas, 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    #NEURO  waxing MS at this   BL subdural hematomas  -no plan for surgical intervention  -repeat CTH now     Seizure today  s/p Ativan 1   Will get CTH, Keppra load    #RESP  Hypoxic respiratory failure on 4-5 L NC  No history of lung disease, but possible leukostasis from blast crisis?  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  In TLS, on CVVHD for hyperkalemia, will give calcium as needed,   Labs q6, LR @ 200 cc/hr  Will given pRBC for anemia     #GOC  DNR/DNI  No NGT  Daughter is plastic surgeon at [employer], please speak to her before any intervention such as pressors MICU Accept Note    CHIEF COMPLAINT: CVVHD for tumor lysis syndrome and hyperkalemia    HPI / INTERVAL HISTORY: As per chart review,   74y F w/ PMH MDS transfer from [place] for b/l subdural hematomas. Patient says she doesn’t know what was happening earlier today and how she ended up in the hospital. Patient is AO to [facility], hospital, and September. Patient states she feels “shitty”. History obtained per chart review as patient is not cooperative with history, not responding to questions, fixated on discomfort from minimal contact from ongoing interventions (placement of leon, bedside ultrasound, bipap, blood draws, etc), no family at bedside, and unable to reach family via available emergency contact. Patient remembered going to [place] to get oatmeal then remembers being the hospital. Per EMS pt was found in her parked car not responding w visible oatmeal in her mouth by a bystander who called 911 when she would not respond to knock on window. On EMS arrival pt was awake and alert but confused. Took a while before she was able to understand how to open the door. On arrival at [place] was noted to have left-sided weakness. Patient had foaming in the mouth. Patient was confused for several hours. No tongue laceration or urinary incontinence. CT scan revealed b/l subdurals and was sent here w stable neuro exam. Patient had returned to the baseline mental status per son but on repeat encounter in ED by MICU, patient seemed to be waxing and waning. Reported that patient had epistaxis last week and stopped taking hydroxyurea and aspirin at that time. Denied fevers, chills, headache, weakness, numbness, vision change, photophobia, neck stiffness, cp, sob, abd pain, n/v/d, dysuria. ED course c/b Tm 101, hypoxemia to 83%, tachycardia to 122, tachypnea to 30s and temporarily placed on bipap. Now sating 95% on 4L.    PAST MEDICAL & SURGICAL HISTORY:  Spleen enlarged  MDS (myelodysplastic syndrome)  No significant past surgical history      FAMILY HISTORY:  Family history of CVA: mother      HOME MEDICATIONS: Hydrea, ASA, Metoprolol      Allergies:   Alcohol (Unknown)  No Known Drug Allergies  shellfish (Unknown)    REVIEW OF SYSTEMS: Unable to assess. Pt lethargic.     OBJECTIVE:  ICU Vital Signs Last 24 Hrs  T(C): 36.8 (27 Sep 2019 09:02), Max: 38.4 (27 Sep 2019 01:31)  T(F): 98.3 (27 Sep 2019 09:02), Max: 101.2 (27 Sep 2019 01:31)  HR: 104 (27 Sep 2019 09:02) (101 - 122)  BP: 103/57 (27 Sep 2019 09:02) (101/60 - 135/91)  BP(mean): --  ABP: --  ABP(mean): --  RR: 16 (27 Sep 2019 09:02) (14 - 20)  SpO2: 100% (27 Sep 2019 09:02) (92% - 100%)        CAPILLARY BLOOD GLUCOSE      POCT Blood Glucose.: 163 mg/dL (26 Sep 2019 16:32)      PHYSICAL EXAM:    Constitutional: NAD  HEENT: sclera non-icteric, MMM  Neck: supple  Respiratory: basal rales, with normal respiratory effort and no intercostal retractions  Cardiovascular: RRR, normal S1S2, no M/R/G  Gastrointestinal: soft, NTND, BS normal in all four quadrants  Extremities:  no peripheral edema  Neurological: AAOx2-3  Skin: Normal temperature    LINES:     HOSPITAL MEDICATIONS:  MEDICATIONS  (STANDING):  allopurinol 150 milliGRAM(s) Oral Once  calcium acetate 1334 milliGRAM(s) Oral four times a day with meals  calcium gluconate IVPB 2 Gram(s) IV Intermittent once  chlorhexidine 4% Liquid 1 Application(s) Topical <User Schedule>  CRRT Treatment    <Continuous>  hydroxyurea 2000 milliGRAM(s) Oral two times a day  lactated ringers. 1000 milliLiter(s) (100 mL/Hr) IV Continuous <Continuous>  levETIRAcetam 750 milliGRAM(s) Oral two times a day  piperacillin/tazobactam IVPB.. 3.375 Gram(s) IV Intermittent every 12 hours  PrismaSATE Dialysate BGK 4 / 2.5 5000 milliLiter(s) (4000 mL/Hr) CRRT <Continuous>  PrismaSOL Filtration BGK 0 / 2.5 5000 milliLiter(s) (1800 mL/Hr) CRRT <Continuous>  PrismaSOL Filtration BGK 0 / 2.5 5000 milliLiter(s) (200 mL/Hr) CRRT <Continuous>  rasburicase IVPB 6 milliGRAM(s) IV Intermittent once    MEDICATIONS  (PRN):  acetaminophen   Tablet .. 650 milliGRAM(s) Oral every 6 hours PRN Temp greater or equal to 38C (100.4F), Mild Pain (1 - 3)  ondansetron Injectable 4 milliGRAM(s) IV Push every 6 hours PRN Nausea      LABS:                        7.0    266.0 )-----------( 102      ( 27 Sep 2019 00:22 )             22.4     Hgb Trend: 7.0<--, 7.0<--, 6.9<--      144  |  100  |  95<H>  ----------------------------<  103<H>  3.4<L>   |  16<L>  |  2.70<H>    Ca    7.4<L>      27 Sep 2019 00:20  Phos  6.9       Mg     1.4         TPro  7.6  /  Alb  4.4  /  TBili  0.4  /  DBili  x   /  AST  87<H>  /  ALT  13  /  AlkPhos  94      Creatinine Trend: 2.70<--, 2.79<--, 2.80<--  PT/INR - ( 26 Sep 2019 19:05 )   PT: 13.2 sec;   INR: 1.14 ratio         PTT - ( 26 Sep 2019 19:05 )  PTT:24.0 sec  Urinalysis Basic - ( 27 Sep 2019 01:46 )    Color: Yellow / Appearance: Slightly Turbid / S.018 / pH: x  Gluc: x / Ketone: Trace  / Bili: Negative / Urobili: Negative   Blood: x / Protein: 100 / Nitrite: Negative   Leuk Esterase: Moderate / RBC: 6-10 /hpf / WBC >50   Sq Epi: x / Non Sq Epi: Few / Bacteria: Many      Arterial Blood Gas:   @ 04:55  7.37/24/58/13/86/-10.7  ABG lactate: --    MICROBIOLOGY:     RADIOLOGY & ADDITIONAL TESTS:    ASSESSMENT AND PLAN:  74F PMH MDS transferred from [place] with BL subdural hematomas, 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    #NEURO  waxing MS at this   BL subdural hematomas  -no plan for surgical intervention  -repeat CTH now     Seizure today  s/p Ativan 1   Will get CTH, Keppra load    #RESP  Hypoxic respiratory failure on 4-5 L NC  No history of lung disease, but possible leukostasis from blast crisis?  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  In TLS, on CVVHD for hyperkalemia, will give calcium as needed,   Labs q6, LR @ 200 cc/hr  Will given pRBC for anemia     #GOC  DNR/DNI  No NGT  Daughter is plastic surgeon at [place], please speak to her before any intervention such as pressors MICU Accept Note    CHIEF COMPLAINT: CVVHD for tumor lysis syndrome and hyperkalemia    HPI / INTERVAL HISTORY: As per chart review,   74y F w/ PMH MDS transfer from [place] for b/l subdural hematomas. Patient says she doesn’t know what was happening earlier today and how she ended up in the hospital. Patient is AO to [facility], hospital, and September. Patient states she feels “shitty”. History obtained per chart review as patient is not cooperative with history, not responding to questions, fixated on discomfort from minimal contact from ongoing interventions (placement of leon, bedside ultrasound, bipap, blood draws, etc), no family at bedside, and unable to reach family via available emergency contact. Patient remembered going to [employer] to get oatmeal then remembers being the hospital. Per EMS pt was found in her parked car not responding w visible oatmeal in her mouth by a bystander who called 911 when she would not respond to knock on window. On EMS arrival pt was awake and alert but confused. Took a while before she was able to understand how to open the door. On arrival at [place] was noted to have left-sided weakness. Patient had foaming in the mouth. Patient was confused for several hours. No tongue laceration or urinary incontinence. CT scan revealed b/l subdurals and was sent here w stable neuro exam. Patient had returned to the baseline mental status per son but on repeat encounter in ED by MICU, patient seemed to be waxing and waning. Reported that patient had epistaxis last week and stopped taking hydroxyurea and aspirin at that time. Denied fevers, chills, headache, weakness, numbness, vision change, photophobia, neck stiffness, cp, sob, abd pain, n/v/d, dysuria. ED course c/b Tm 101, hypoxemia to 83%, tachycardia to 122, tachypnea to 30s and temporarily placed on bipap. Now sating 95% on 4L.    PAST MEDICAL & SURGICAL HISTORY:  Spleen enlarged  MDS (myelodysplastic syndrome)  No significant past surgical history      FAMILY HISTORY:  Family history of CVA: mother      HOME MEDICATIONS: Hydrea, ASA, Metoprolol      Allergies:   Alcohol (Unknown)  No Known Drug Allergies  shellfish (Unknown)    REVIEW OF SYSTEMS: Unable to assess. Pt lethargic.     OBJECTIVE:  ICU Vital Signs Last 24 Hrs  T(C): 36.8 (27 Sep 2019 09:02), Max: 38.4 (27 Sep 2019 01:31)  T(F): 98.3 (27 Sep 2019 09:02), Max: 101.2 (27 Sep 2019 01:31)  HR: 104 (27 Sep 2019 09:02) (101 - 122)  BP: 103/57 (27 Sep 2019 09:02) (101/60 - 135/91)  BP(mean): --  ABP: --  ABP(mean): --  RR: 16 (27 Sep 2019 09:02) (14 - 20)  SpO2: 100% (27 Sep 2019 09:02) (92% - 100%)        CAPILLARY BLOOD GLUCOSE      POCT Blood Glucose.: 163 mg/dL (26 Sep 2019 16:32)      PHYSICAL EXAM:    Constitutional: NAD  HEENT: sclera non-icteric, MMM  Neck: supple  Respiratory: basal rales, with normal respiratory effort and no intercostal retractions  Cardiovascular: RRR, normal S1S2, no M/R/G  Gastrointestinal: soft, NTND, BS normal in all four quadrants  Extremities:  no peripheral edema  Neurological: AAOx2-3  Skin: Normal temperature    LINES:     HOSPITAL MEDICATIONS:  MEDICATIONS  (STANDING):  allopurinol 150 milliGRAM(s) Oral Once  calcium acetate 1334 milliGRAM(s) Oral four times a day with meals  calcium gluconate IVPB 2 Gram(s) IV Intermittent once  chlorhexidine 4% Liquid 1 Application(s) Topical <User Schedule>  CRRT Treatment    <Continuous>  hydroxyurea 2000 milliGRAM(s) Oral two times a day  lactated ringers. 1000 milliLiter(s) (100 mL/Hr) IV Continuous <Continuous>  levETIRAcetam 750 milliGRAM(s) Oral two times a day  piperacillin/tazobactam IVPB.. 3.375 Gram(s) IV Intermittent every 12 hours  PrismaSATE Dialysate BGK 4 / 2.5 5000 milliLiter(s) (4000 mL/Hr) CRRT <Continuous>  PrismaSOL Filtration BGK 0 / 2.5 5000 milliLiter(s) (1800 mL/Hr) CRRT <Continuous>  PrismaSOL Filtration BGK 0 / 2.5 5000 milliLiter(s) (200 mL/Hr) CRRT <Continuous>  rasburicase IVPB 6 milliGRAM(s) IV Intermittent once    MEDICATIONS  (PRN):  acetaminophen   Tablet .. 650 milliGRAM(s) Oral every 6 hours PRN Temp greater or equal to 38C (100.4F), Mild Pain (1 - 3)  ondansetron Injectable 4 milliGRAM(s) IV Push every 6 hours PRN Nausea      LABS:                        7.0    266.0 )-----------( 102      ( 27 Sep 2019 00:22 )             22.4     Hgb Trend: 7.0<--, 7.0<--, 6.9<--      144  |  100  |  95<H>  ----------------------------<  103<H>  3.4<L>   |  16<L>  |  2.70<H>    Ca    7.4<L>      27 Sep 2019 00:20  Phos  6.9       Mg     1.4         TPro  7.6  /  Alb  4.4  /  TBili  0.4  /  DBili  x   /  AST  87<H>  /  ALT  13  /  AlkPhos  94      Creatinine Trend: 2.70<--, 2.79<--, 2.80<--  PT/INR - ( 26 Sep 2019 19:05 )   PT: 13.2 sec;   INR: 1.14 ratio         PTT - ( 26 Sep 2019 19:05 )  PTT:24.0 sec  Urinalysis Basic - ( 27 Sep 2019 01:46 )    Color: Yellow / Appearance: Slightly Turbid / S.018 / pH: x  Gluc: x / Ketone: Trace  / Bili: Negative / Urobili: Negative   Blood: x / Protein: 100 / Nitrite: Negative   Leuk Esterase: Moderate / RBC: 6-10 /hpf / WBC >50   Sq Epi: x / Non Sq Epi: Few / Bacteria: Many      Arterial Blood Gas:   @ 04:55  7.37/24/58/13/86/-10.7  ABG lactate: --    MICROBIOLOGY:     RADIOLOGY & ADDITIONAL TESTS:    ASSESSMENT AND PLAN:  74F PMH MDS transferred from [place] with BL subdural hematomas, 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    #NEURO  waxing MS at this   BL subdural hematomas  -no plan for surgical intervention  -repeat CTH now     Seizure today  s/p Ativan 1   Will get CTH, Keppra load    #RESP  Hypoxic respiratory failure on 4-5 L NC  No history of lung disease, but possible leukostasis from blast crisis?  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

## 2019-09-27 NOTE — H&P ADULT - PROBLEM SELECTOR PLAN 10
DVT ppx: SCDs, hold pharmacologic ppx in setting of SDH  NPO for now in setting of critical illness, waxing and waning mental status  Fall, seizure, aspiration precaution DVT ppx: SCDs, hold pharmacologic ppx in setting of SDH  NPO for now in setting of critical illness, waxing and waning mental status  S/S eval  Fall, seizure, aspiration precaution  GOC: DNR order placed in ED; unclear if DNI as well; per signout given to RN, MOLST filled out with son-in-law who temporarily left the ED with the form but should be coming back with the form; needs follow up  Needs med-rec in AM DVT ppx: SCDs, hold pharmacologic ppx in setting of SDH  NPO for now in setting of critical illness, waxing and waning mental status  S/S eval  Fall, seizure, aspiration precaution  GOC: DNR order placed in ED; confirmed entire MOLST with HCP/daughter, pt also DNI and no feeding tube, but +trial of IVF and +trial of abx.   Needs med-rec in AM, daughter at bedside/HCP unable to provide full list.

## 2019-09-27 NOTE — H&P ADULT - PROBLEM SELECTOR PLAN 7
- check records from Dr. Moon at [facility] including BMBx  - hydroxyurea 2g BID  - monitor CBC daily  - f/u heme recs

## 2019-09-27 NOTE — CONSULT NOTE ADULT - SUBJECTIVE AND OBJECTIVE BOX
HPI:  74F w/ PMH of Myelodysplastic syndrome on ASA (and hydroxyurea per daughter who is physician at [facility]), was noted to have LOC episode in car, later found to have bilateral SDH at [facility]. Was transferred here for further care. Neurology consulted for LOC episode while in car.     Patient accompanied by son whom is at bedside. Son was not there during the episode. Patient states she was sitting in her car and she doesn't remember anything else. States she woke up at [facility]. Son states patient was confused for several hours after arriving at [place] but later returned to her baseline mental status. [place] ED states patient found foaming at the mouth. No tongue laceration or urinary incontinence. No hx of seizures or falls.     Labs from PV notable for HgB 6.9, plt 42, . CT head shows acute SDH, 1cm on R, 3mm on L, no shift.    A 10-system ROS was performed and is negative except for those items noted above and/or in the HPI.    PAST MEDICAL & SURGICAL HISTORY:  Spleen enlarged  MDS (myelodysplastic syndrome)    FAMILY HISTORY:    SOCIAL HISTORY:   T/E/D: No smoke, drink, drugs     MEDICATIONS (HOME):  Home Medications:    Exam:   MS: A&Ox3. Language fluent, comprehensive w/ intact repetition. Severe L.  Visuospatial neglect, anosognosia, though her arm was mine. Can follow all commands in right hand but not left.   CN: VFF. LANE. EOMI. V1-V3 intact. Face symmetric. T/u midline. Normal shrug.   Motor: Diffuse atrophy. Increased tone throughout. LUE drift (poor effort, suspect its 2/2 neglect as patient able to keep it up past 10 seconds when assisted).  LLE 5/5. R. side full strength.   Sensation: intact to PP throughout  Reflexes: 3+ in LE, ?Clonus in r. patella. B  Coordination: No dysmetria on FNF or H2S bilaterally   Gait: Deferred     STUDIES & IMAGIN-27 Na 144   K 3.4<L>   P --   Mg --   Ca 7.4<L>   Cr 2.70<H>

## 2019-09-27 NOTE — CONSULT NOTE ADULT - SUBJECTIVE AND OBJECTIVE BOX
[facility] DIVISION OF KIDNEY DISEASES AND HYPERTENSION -- INITIAL CONSULT NOTE  --------------------------------------------------------------------------------  HPI:    History obtained from pt's daughter and chart review pt was confused.     73yo F with hx MPD on HU and ASA (followed by Dr. Edwina Moon at [facility]) transferred from [facility] for b/l SDH after found unconscious and  with 20% blasts, PLT 40. As per daughter pt was in her usual state of health, until past days, independent on daily activities.. Pt was found in her car unresponsiveness  by a bystander called for EMS, transferred to [facility], upon arrival pt was awake and alert but confused, CT scan revealed b/l subdurals, transferred to [facility] for further evaluation.  As per chart pt did not have seizure, LOC, syncope, stroke, Family denied recent falls/trauma, hx of bleeding disorder or kidney disease. She takes aspirin, but no other blood thinners. Labs on arrival to [facility] showed  with 20% blasts, PLT 40. Scr of 2.80.  Given 1u PLT and DDAVP. ED course c/b fever T101.2, hypoxia to 83% on NC that improved to 89% on 5L NC, tachypnea to 30s. Placed on BIPAP with RR 27. Overnight was evaluated by HemOnc, whom started on hydroxyurea, allopurinol, MICU evaluated initially suggest to candidate for MICU admission. Today pt noted to have scr at 2.7, decreasing UOP, uric acid 18, rasburicase ordered, Phos elevated, Nephrology consulted for ADITHYA in the setting of TLS.      During examination pt was somnolent, opened eyes to verbal commands, able to say few words, but overall had periods of confusion, family at bed side, whom stated not her baseline, denies previous hx of kidney disease, NSAID use or herbal medications. On review of labs no previous labs to compare.     PAST HISTORY  --------------------------------------------------------------------------------  PAST MEDICAL & SURGICAL HISTORY:  Spleen enlarged  MDS (myelodysplastic syndrome)  No significant past surgical history    FAMILY HISTORY:  Family history of CVA: mother    PAST SOCIAL HISTORY:    ALLERGIES & MEDICATIONS  --------------------------------------------------------------------------------  Allergies    Alcohol (Unknown)  No Known Drug Allergies  shellfish (Unknown)    Intolerances      Standing Inpatient Medications  allopurinol 150 milliGRAM(s) Oral Once  calcium acetate 1334 milliGRAM(s) Oral four times a day with meals  calcium gluconate IVPB 2 Gram(s) IV Intermittent once  chlorhexidine 4% Liquid 1 Application(s) Topical <User Schedule>  CRRT Treatment    <Continuous>  hydroxyurea 2000 milliGRAM(s) Oral two times a day  lactated ringers. 1000 milliLiter(s) IV Continuous <Continuous>  levETIRAcetam 750 milliGRAM(s) Oral two times a day  piperacillin/tazobactam IVPB.. 3.375 Gram(s) IV Intermittent every 12 hours  PrismaSATE Dialysate BGK 4 / 2.5 5000 milliLiter(s) CRRT <Continuous>  PrismaSOL Filtration BGK 0 / 2.5 5000 milliLiter(s) CRRT <Continuous>  PrismaSOL Filtration BGK 0 / 2.5 5000 milliLiter(s) CRRT <Continuous>  rasburicase IVPB 6 milliGRAM(s) IV Intermittent once    PRN Inpatient Medications  acetaminophen   Tablet .. 650 milliGRAM(s) Oral every 6 hours PRN  ondansetron Injectable 4 milliGRAM(s) IV Push every 6 hours PRN      REVIEW OF SYSTEMS  --------------------------------------------------------------------------------  not able to obtain.     VITALS/PHYSICAL EXAM  --------------------------------------------------------------------------------  T(C): 36.8 (09-27-19 @ 09:02), Max: 38.4 (09-27-19 @ 01:31)  HR: 104 (09-27-19 @ 09:02) (101 - 122)  BP: 103/57 (09-27-19 @ 09:02) (101/60 - 135/91)  RR: 16 (09-27-19 @ 09:02) (14 - 20)  SpO2: 100% (09-27-19 @ 09:02) (92% - 100%)  Wt(kg): --  Height (cm): 160.02 (09-26-19 @ 18:38)  Weight (kg): 52.2 (09-27-19 @ 06:07)  BMI (kg/m2): 20.4 (09-27-19 @ 06:07)  BSA (m2): 1.53 (09-27-19 @ 06:07)      Physical Exam:  	Gen: NAD,  	HEENT: Oral mucosa moist.   	Pulm: CTA B/L  	CV: RRR, S1S2; no rub  	Abd: +BS, soft, slightly tender, splenomegaly.   	: No suprapubic tenderness  	UE:  no edema;   	LE: no edema  	Neuro: somnolent, limited  	Vascular access: none.     LABS/STUDIES  --------------------------------------------------------------------------------              7.0    266.0 >-----------<  102      [09-27-19 @ 00:22]              22.4     144  |  100  |  95  ----------------------------<  103      [09-27-19 @ 00:20]  3.4   |  16  |  2.70        Ca     7.4     [09-27-19 @ 00:20]      Mg     1.4     [09-26-19 @ 19:06]      Phos  6.9     [09-27-19 @ 00:20]    TPro  7.6  /  Alb  4.4  /  TBili  0.4  /  DBili  x   /  AST  87  /  ALT  13  /  AlkPhos  94  [09-27-19 @ 00:20]    PT/INR: PT 13.2 , INR 1.14       [09-26-19 @ 19:05]  PTT: 24.0       [09-26-19 @ 19:05]    Uric acid 18.4      [09-27-19 @ 00:20]  Troponin .121      [09-26-19 @ 16:54]  LDH >2250      [09-27-19 @ 00:20]    Creatinine Trend:  SCr 2.70 [09-27 @ 00:20]  SCr 2.79 [09-26 @ 19:05]  SCr 2.80 [09-26 @ 16:54]    Urinalysis - [09-27-19 @ 01:46]      Color Yellow / Appearance Slightly Turbid / SG 1.018 / pH 6.5      Gluc Negative / Ketone Trace  / Bili Negative / Urobili Negative       Blood Moderate / Protein 100 / Leuk Est Moderate / Nitrite Negative      RBC 6-10 / WBC >50 / Hyaline 0-2 / Gran  / Sq Epi  / Non Sq Epi Few / Bacteria Many        HBsAb Nonreact      [09-27-19 @ 05:35]  HBsAg Nonreact      [09-27-19 @ 05:35]  HCV 0.18, Nonreact      [09-27-19 @ 05:35]  HIV Nonreact      [09-27-19 @ 02:50]

## 2019-09-27 NOTE — H&P ADULT - PROBLEM SELECTOR PLAN 1
Repeat CT showed stable appearance of right > left holohemispheric   subdural hematomas with minimal mass effect and minimal leftward midline   shift (0.3 cm). Small falcine subdural hematoma unchanged. No   acute infarcts. No depressed skull fracture.  - s/p 1U platelet and DDAVP  - neurosurgery recs appreciated; no plan for surgical intervention Repeat CT showed stable appearance of right > left holohemispheric   subdural hematomas with minimal mass effect and minimal leftward midline   shift (0.3 cm). Small falcine subdural hematoma unchanged. No   acute infarcts. No depressed skull fracture.  - s/p 1U platelet and DDAVP  - neurosurgery recs appreciated; no plan for surgical intervention  - monitor neuro checks  - monitor CBC Repeat CT showed stable appearance of right > left holohemispheric   subdural hematomas with minimal mass effect and minimal leftward midline   shift (0.3 cm). Small falcine subdural hematoma unchanged. No   acute infarcts. No depressed skull fracture.  - s/p 1U platelet and DDAVP  - neurosurgery recs appreciated; no plan for surgical intervention  - repeat CT in 24 hours  - monitor neuro checks  - monitor CBC, platelet goal >100 Repeat CT showed stable appearance of right > left holohemispheric subdural hematomas with minimal mass effect and minimal leftward midline shift (0.3 cm). Small falcine subdural hematoma unchanged. No acute infarcts. No depressed skull fracture.  - s/p 1U platelet and DDAVP  - neurosurgery recs appreciated; no plan for surgical intervention  - repeat CT in 24 hours  - monitor neuro checks  - monitor CBC, platelet goal >100 Repeat CT showed stable appearance of right > left holohemispheric subdural hematomas with minimal mass effect and minimal leftward midline shift (0.3 cm). Small falcine subdural hematoma unchanged. No acute infarcts. No depressed skull fracture.  - s/p 1U platelet and DDAVP  - neurosurgery recs appreciated; no plan for surgical intervention  - repeat CT in 24 hours  - monitor neuro checks  - monitor CBC, platelet goal >100  - will need to discuss with renal regarding CTA of head and neck Repeat CT showed stable appearance of right > left holohemispheric subdural hematomas with minimal mass effect and minimal leftward midline shift (0.3 cm). Small falcine subdural hematoma unchanged. No acute infarcts. No depressed skull fracture.  - s/p 1U platelet and DDAVP given uremia. HOlding aspirin.   - neurosurgery recs appreciated; no plan for surgical intervention  - repeat CT in 24 hours  - monitor neuro checks  - monitor CBC, platelet goal >100  - neuro recs appreciated; will defer to day team to discuss with renal garding CTA of head and neck if Scr improved. Also can consider MRI without Baldomero.

## 2019-09-27 NOTE — ED ADULT NURSE REASSESSMENT NOTE - NS ED NURSE REASSESS COMMENT FT1
Patient has a rectal temp of 101.2 and a respiratory rate of 38 with an O2 of 89 and HR of 122. MD Rao made aware. As per MD patient is to receive 975mg of Tylenol PO, blood cultures to be drawn and antibiotics to be started. Patient is a/ox3, confused at times and in NAD at this time. Will continue to monitor and reassess.

## 2019-09-27 NOTE — CONSULT NOTE ADULT - SUBJECTIVE AND OBJECTIVE BOX
CHIEF COMPLAINT: LOC      HPI: 74 year old female with history of MPD on HU and ASA (followed by Dr. Edwina Moon at [facility]) presents from OSH for b/l SDH after found unconscious and  with 20% blasts, PLT 40. Son-in-law at bedside. Pt was found in her car yesterday and a bystander called for EMS. She was arousable, but confused, found to have R>L SDH on CTH, AO x 3, L-sided weakness on neuro exam. Her LNW was  since she lives alone. She remembers going to [employer] to get oatmeal, then remembers being in the hospital. Per SONG at bedside, she is at her baseline mental status but speaking slower/less. Pt denies hx of seizure, LOC, syncope, stroke. Denies recent falls/trauma, hx of bleeding disorder or kidney disease. She takes aspirin, but no anticoagulation/anti-platelets. Denies HA, change of vision, weakness, numbness, CP, SOB, F/C, abd pain, dysuria, photophobia, neck stiffness, N/V/D. Blood work showed  with 20% blasts. ED course c/b fever T101.2, hypoxia to 83% on NC that improved to 89% on 5L NC, tachypnea to 30s. Placed on BIPAP with RR 27. MICU consulted for possible need for leukophoresis and hypoxia.      PAST MEDICAL & SURGICAL HISTORY:  Spleen enlarged  MDS (myelodysplastic syndrome)      FAMILY HISTORY:      SOCIAL HISTORY:  Smoking: [x ] Never Smoked [ ] Former Smoker (__ packs x ___ years) [ ] Current Smoker  (__ packs x ___ years)  Substance Use: [ x] Never Used [ ] Used ____  EtOH Use:  Marital Status: [ ] Single [ ]  [ ]  [ ]   Sexual History:   Occupation:  Recent Travel:  Country of Birth:  Advance Directives:    Allergies    Alcohol (Unknown)  No Known Drug Allergies  shellfish (Unknown)    Intolerances        HOME MEDICATIONS:  Aspirin  Hydroxyurea  Metoprolol    REVIEW OF SYSTEMS:  Constitutional: [x ] negative [ ] fevers [ ] chills [ ] weight loss [ ] weight gain  HEENT: [x ] negative [ ] dry eyes [ ] eye irritation [ ] postnasal drip [ ] nasal congestion  CV: [ x] negative  [ ] chest pain [ ] orthopnea [ ] palpitations [ ] murmur  Resp: [ x] negative [ ] cough [ ] shortness of breath [ ] dyspnea [ ] wheezing [ ] sputum [ ] hemoptysis  GI: [ x] negative [ ] nausea [ ] vomiting [ ] diarrhea [ ] constipation [ ] abd pain [ ] dysphagia   : [ x] negative [ ] dysuria [ ] nocturia [ ] hematuria [ ] increased urinary frequency  Musculoskeletal: [ ] negative [x ] back pain [ ] myalgias [ ] arthralgias [ ] fracture  Skin: [ x] negative [ ] rash [ ] itch  Neurological: see HPI  Psychiatric: x[ ] negative [ ] anxiety [ ] depression  Endocrine: [x ] negative [ ] diabetes [ ] thyroid problem  Hematologic/Lymphatic: [x ] negative [ ] anemia [ ] bleeding problem  Allergic/Immunologic: [x ] negative [ ] itchy eyes [ ] nasal discharge [ ] hives [ ] angioedema  [ ] All other systems negative  [ ] Unable to assess ROS because ________    OBJECTIVE:  ICU Vital Signs Last 24 Hrs  T(C): 38.4 (27 Sep 2019 01:31), Max: 38.4 (27 Sep 2019 01:31)  T(F): 101.2 (27 Sep 2019 01:31), Max: 101.2 (27 Sep 2019 01:31)  HR: 113 (27 Sep 2019 03:09) (101 - 122)  BP: 103/66 (27 Sep 2019 03:09) (103/66 - 135/91)  BP(mean): --  ABP: --  ABP(mean): --  RR: 14 (27 Sep 2019 03:09) (14 - 20)  SpO2: 96% (27 Sep 2019 03:09) (92% - 98%)        CAPILLARY BLOOD GLUCOSE      POCT Blood Glucose.: 163 mg/dL (26 Sep 2019 16:32)      PHYSICAL EXAM:  General: NAD, thin  Neurology: A&Ox3 (self, [facility], 2019), CN intact except will not gaze left, L juvencio-neglect, speech fluent but short responses, sensation to light touch intact, strength 4/5 on right, 3/4 on left, FTN intact RUE, pt sometimes follows commands using left side  Eyes: PERRLA, Gross vision intact  HEENT: Neck supple, trachea midline, No JVD, Gross hearing intact, cervical spine nontender, no nuchal rigidity  Respiratory: Crackles at bases, no wheeze, respirations nonlabored on O2 NC then placed on BIPAP with RR 27  CV: RRR, S1S2, no murmurs, rubs or gallops  Abdominal: Soft, NT, ND +BS, no guarding  Extremities: No edema, + peripheral pulses  Skin: No Rashes, Hematoma. Ecchymoses over arms/legs    Lines/drains/airway:     LINES:     HOSPITAL MEDICATIONS:  MEDICATIONS  (STANDING):  allopurinol 150 milliGRAM(s) Oral Once  lactated ringers. 1000 milliLiter(s) (100 mL/Hr) IV Continuous <Continuous>  sodium chloride 0.9% Bolus 1000 milliLiter(s) IV Bolus once    MEDICATIONS  (PRN):      LABS:                        7.0    266.0 )-----------( 102      ( 27 Sep 2019 00:22 )             22.4     Hgb Trend: 7.0<--, 7.0<--, 6.9<--      144  |  100  |  95<H>  ----------------------------<  103<H>  3.4<L>   |  16<L>  |  2.70<H>    Ca    7.4<L>      27 Sep 2019 00:20  Phos  7.4       Mg     1.4         TPro  7.6  /  Alb  4.4  /  TBili  0.4  /  DBili  x   /  AST  87<H>  /  ALT  13  /  AlkPhos  94      Creatinine Trend: 2.70<--, 2.79<--, 2.80<--  PT/INR - ( 26 Sep 2019 19:05 )   PT: 13.2 sec;   INR: 1.14 ratio         PTT - ( 26 Sep 2019 19:05 )  PTT:24.0 sec  Urinalysis Basic - ( 27 Sep 2019 01:46 )    Color: Yellow / Appearance: Slightly Turbid / S.018 / pH: x  Gluc: x / Ketone: Trace  / Bili: Negative / Urobili: Negative   Blood: x / Protein: 100 / Nitrite: Negative   Leuk Esterase: Moderate / RBC: 6-10 /hpf / WBC >50   Sq Epi: x / Non Sq Epi: Few / Bacteria: Many    D-dimer 9,200  Fibrinogen 448  LDH 2250  Uric acid 18.4  trop I 0.121      MICROBIOLOGY:   BCx, UCx pending    RADIOLOGY:  < from: Xray Chest 1 View- PORTABLE-Urgent (19 @ 00:16) >  EXAM:  XR CHEST PORTABLE URGENT 1V                        PROCEDURE DATE:  2019    ******PRELIMINARY REPORT******    ******PRELIMINARY REPORT******          INTERPRETATION:  Asymmetric pulmonary edema, right greater than left.    ******PRELIMINARY REPORT******      < end of copied text >    [ x] Reviewed and interpreted by me    EKG: CHIEF COMPLAINT: LOC      HPI: 73yo F with hx MPD on HU and ASA (followed by Dr. Edwina Moon at [facility]) presents from OSH for b/l SDH after found unconscious and  with 20% blasts, PLT 40. Son-in-law at bedside. Pt was found in her car yesterday and a bystander called for EMS. She was arousable, but confused, found to have R>L SDH on CTH, AO x 3, L-sided weakness on neuro exam. Her LNW was  since she lives alone. She remembers going to [employer] to get oatmeal, then remembers being in the hospital. Per SONG at bedside, she is at her baseline mental status but speaking slower/less. Pt denies hx of seizure, LOC, syncope, stroke. Denies recent falls/trauma, hx of bleeding disorder or kidney disease. She takes aspirin, but no anticoagulation/anti-platelets. Denies HA, change of vision, weakness, numbness, CP, SOB, F/C, abd pain, dysuria, photophobia, neck stiffness, N/V/D. Blood work showed  with 20% blasts. ED course c/b fever T101.2, hypoxia to 83% on NC that improved to 89% on 5L NC, tachypnea to 30s. Placed on BIPAP with RR 27. MICU consulted for possible need for leukophoresis and hypoxia.      PAST MEDICAL & SURGICAL HISTORY:  Spleen enlarged  MDS (myelodysplastic syndrome)      FAMILY HISTORY:      SOCIAL HISTORY:  Smoking: [x ] Never Smoked [ ] Former Smoker (__ packs x ___ years) [ ] Current Smoker  (__ packs x ___ years)  Substance Use: [ x] Never Used [ ] Used ____  EtOH Use:  Marital Status: [ ] Single [ ]  [ ]  [ ]   Sexual History:   Occupation:  Recent Travel:  Country of Birth:  Advance Directives:    Allergies    Alcohol (Unknown)  No Known Drug Allergies  shellfish (Unknown)    Intolerances        HOME MEDICATIONS:  Aspirin  Hydroxyurea  Metoprolol    REVIEW OF SYSTEMS:  Constitutional: [x ] negative [ ] fevers [ ] chills [ ] weight loss [ ] weight gain  HEENT: [x ] negative [ ] dry eyes [ ] eye irritation [ ] postnasal drip [ ] nasal congestion  CV: [ x] negative  [ ] chest pain [ ] orthopnea [ ] palpitations [ ] murmur  Resp: [ x] negative [ ] cough [ ] shortness of breath [ ] dyspnea [ ] wheezing [ ] sputum [ ] hemoptysis  GI: [ x] negative [ ] nausea [ ] vomiting [ ] diarrhea [ ] constipation [ ] abd pain [ ] dysphagia   : [ x] negative [ ] dysuria [ ] nocturia [ ] hematuria [ ] increased urinary frequency  Musculoskeletal: [ ] negative [x ] back pain [ ] myalgias [ ] arthralgias [ ] fracture  Skin: [ x] negative [ ] rash [ ] itch  Neurological: see HPI  Psychiatric: x[ ] negative [ ] anxiety [ ] depression  Endocrine: [x ] negative [ ] diabetes [ ] thyroid problem  Hematologic/Lymphatic: [x ] negative [ ] anemia [ ] bleeding problem  Allergic/Immunologic: [x ] negative [ ] itchy eyes [ ] nasal discharge [ ] hives [ ] angioedema  [ ] All other systems negative  [ ] Unable to assess ROS because ________    OBJECTIVE:  ICU Vital Signs Last 24 Hrs  T(C): 38.4 (27 Sep 2019 01:31), Max: 38.4 (27 Sep 2019 01:31)  T(F): 101.2 (27 Sep 2019 01:31), Max: 101.2 (27 Sep 2019 01:31)  HR: 113 (27 Sep 2019 03:09) (101 - 122)  BP: 103/66 (27 Sep 2019 03:09) (103/66 - 135/91)  BP(mean): --  ABP: --  ABP(mean): --  RR: 14 (27 Sep 2019 03:09) (14 - 20)  SpO2: 96% (27 Sep 2019 03:09) (92% - 98%)        CAPILLARY BLOOD GLUCOSE      POCT Blood Glucose.: 163 mg/dL (26 Sep 2019 16:32)      PHYSICAL EXAM:  General: NAD, thin  Neurology: A&Ox3 (self, [facility], 2019), CN intact except will not gaze left, L juvencio-neglect, speech fluent but short responses, sensation to light touch intact, strength 4/5 on right, 3/4 on left, FTN intact RUE, pt sometimes follows commands using left side  Eyes: PERRLA, Gross vision intact  HEENT: Neck supple, trachea midline, No JVD, Gross hearing intact, cervical spine nontender, no nuchal rigidity  Respiratory: Crackles at bases, no wheeze, respirations nonlabored on O2 NC then placed on BIPAP with RR 27  CV: RRR, S1S2, no murmurs, rubs or gallops  Abdominal: Soft, NT, ND +BS, no guarding  Extremities: No edema, + peripheral pulses  Skin: No Rashes, Hematoma. Ecchymoses over arms/legs    Lines/drains/airway:     LINES:     HOSPITAL MEDICATIONS:  MEDICATIONS  (STANDING):  allopurinol 150 milliGRAM(s) Oral Once  lactated ringers. 1000 milliLiter(s) (100 mL/Hr) IV Continuous <Continuous>  sodium chloride 0.9% Bolus 1000 milliLiter(s) IV Bolus once    MEDICATIONS  (PRN):      LABS:                        7.0    266.0 )-----------( 102      ( 27 Sep 2019 00:22 )             22.4     Hgb Trend: 7.0<--, 7.0<--, 6.9<--      144  |  100  |  95<H>  ----------------------------<  103<H>  3.4<L>   |  16<L>  |  2.70<H>    Ca    7.4<L>      27 Sep 2019 00:20  Phos  7.4       Mg     1.4         TPro  7.6  /  Alb  4.4  /  TBili  0.4  /  DBili  x   /  AST  87<H>  /  ALT  13  /  AlkPhos  94      Creatinine Trend: 2.70<--, 2.79<--, 2.80<--  PT/INR - ( 26 Sep 2019 19:05 )   PT: 13.2 sec;   INR: 1.14 ratio         PTT - ( 26 Sep 2019 19:05 )  PTT:24.0 sec  Urinalysis Basic - ( 27 Sep 2019 01:46 )    Color: Yellow / Appearance: Slightly Turbid / S.018 / pH: x  Gluc: x / Ketone: Trace  / Bili: Negative / Urobili: Negative   Blood: x / Protein: 100 / Nitrite: Negative   Leuk Esterase: Moderate / RBC: 6-10 /hpf / WBC >50   Sq Epi: x / Non Sq Epi: Few / Bacteria: Many    D-dimer 9,200  Fibrinogen 448  LDH 2250  Uric acid 18.4  trop I 0.121      MICROBIOLOGY:   BCx, UCx pending    RADIOLOGY:  < from: Xray Chest 1 View- PORTABLE-Urgent (19 @ 00:16) >  EXAM:  XR CHEST PORTABLE URGENT 1V                        PROCEDURE DATE:  2019    ******PRELIMINARY REPORT******    ******PRELIMINARY REPORT******          INTERPRETATION:  Asymmetric pulmonary edema, right greater than left.    ******PRELIMINARY REPORT******      < end of copied text >    [ x] Reviewed and interpreted by me    EKG: CHIEF COMPLAINT: LOC      HPI: 75yo F with hx MPD on HU and ASA (followed by Dr. Edwina Moon at [facility]) presents from OSH for b/l SDH after found unconscious and  with 20% blasts, PLT 40. Son-in-law at bedside. Pt was found in her car yesterday and a bystander called for EMS. She was arousable, but confused, found to have R>L SDH on CTH, AO x 3, L-sided weakness on neuro exam. Her LNW was  since she lives alone. She remembers going to [employer] to get oatmeal, then remembers being in the hospital. Per SONG at bedside, she is at her baseline mental status but speaking slower/less. Pt denies hx of seizure, LOC, syncope, stroke. Denies recent falls/trauma, hx of bleeding disorder or kidney disease. She takes aspirin, but no anticoagulation/anti-platelets. Denies HA, change of vision, weakness, numbness, CP, SOB, F/C, abd pain, dysuria, photophobia, neck stiffness, N/V/D. Blood work showed  with 20% blasts, PLT 40. Given 1u PLT and DDAVP. ED course c/b fever T101.2, hypoxia to 83% on NC that improved to 89% on 5L NC, tachypnea to 30s. Placed on BIPAP with RR 27. MICU consulted for possible need for leukophoresis and hypoxia.      PAST MEDICAL & SURGICAL HISTORY:  Spleen enlarged  MDS (myelodysplastic syndrome)      FAMILY HISTORY:      SOCIAL HISTORY:  Smoking: [x ] Never Smoked [ ] Former Smoker (__ packs x ___ years) [ ] Current Smoker  (__ packs x ___ years)  Substance Use: [ x] Never Used [ ] Used ____  EtOH Use:  Marital Status: [ ] Single [ ]  [ ]  [ ]   Sexual History:   Occupation:  Recent Travel:  Country of Birth:  Advance Directives:    Allergies    Alcohol (Unknown)  No Known Drug Allergies  shellfish (Unknown)    Intolerances        HOME MEDICATIONS:  Aspirin  Hydroxyurea  Metoprolol    REVIEW OF SYSTEMS:  Constitutional: [x ] negative [ ] fevers [ ] chills [ ] weight loss [ ] weight gain  HEENT: [x ] negative [ ] dry eyes [ ] eye irritation [ ] postnasal drip [ ] nasal congestion  CV: [ x] negative  [ ] chest pain [ ] orthopnea [ ] palpitations [ ] murmur  Resp: [ x] negative [ ] cough [ ] shortness of breath [ ] dyspnea [ ] wheezing [ ] sputum [ ] hemoptysis  GI: [ x] negative [ ] nausea [ ] vomiting [ ] diarrhea [ ] constipation [ ] abd pain [ ] dysphagia   : [ x] negative [ ] dysuria [ ] nocturia [ ] hematuria [ ] increased urinary frequency  Musculoskeletal: [ ] negative [x ] back pain [ ] myalgias [ ] arthralgias [ ] fracture  Skin: [ x] negative [ ] rash [ ] itch  Neurological: see HPI  Psychiatric: x[ ] negative [ ] anxiety [ ] depression  Endocrine: [x ] negative [ ] diabetes [ ] thyroid problem  Hematologic/Lymphatic: [x ] negative [ ] anemia [ ] bleeding problem  Allergic/Immunologic: [x ] negative [ ] itchy eyes [ ] nasal discharge [ ] hives [ ] angioedema  [ ] All other systems negative  [ ] Unable to assess ROS because ________    OBJECTIVE:  ICU Vital Signs Last 24 Hrs  T(C): 38.4 (27 Sep 2019 01:31), Max: 38.4 (27 Sep 2019 01:31)  T(F): 101.2 (27 Sep 2019 01:31), Max: 101.2 (27 Sep 2019 01:31)  HR: 113 (27 Sep 2019 03:09) (101 - 122)  BP: 103/66 (27 Sep 2019 03:09) (103/66 - 135/91)  BP(mean): --  ABP: --  ABP(mean): --  RR: 14 (27 Sep 2019 03:09) (14 - 20)  SpO2: 96% (27 Sep 2019 03:09) (92% - 98%)        CAPILLARY BLOOD GLUCOSE      POCT Blood Glucose.: 163 mg/dL (26 Sep 2019 16:32)      PHYSICAL EXAM:  General: NAD, thin  Neurology: A&Ox3 (self, [facility], 2019), CN intact except will not gaze left, L juvencio-neglect, speech fluent but short responses, sensation to light touch intact, strength 4/5 on right, 3/4 on left, FTN intact RUE, pt sometimes follows commands using left side  Eyes: PERRLA, Gross vision intact  HEENT: Neck supple, trachea midline, No JVD, Gross hearing intact, cervical spine nontender, no nuchal rigidity  Respiratory: Crackles at bases, no wheeze, respirations nonlabored on O2 NC then placed on BIPAP with RR 27  CV: RRR, S1S2, no murmurs, rubs or gallops  Abdominal: Soft, NT, ND +BS, no guarding  Extremities: No edema, + peripheral pulses  Skin: No Rashes, Hematoma. Ecchymoses over arms/legs    Lines/drains/airway:     LINES:     HOSPITAL MEDICATIONS:  MEDICATIONS  (STANDING):  allopurinol 150 milliGRAM(s) Oral Once  lactated ringers. 1000 milliLiter(s) (100 mL/Hr) IV Continuous <Continuous>  sodium chloride 0.9% Bolus 1000 milliLiter(s) IV Bolus once    MEDICATIONS  (PRN):      LABS:                        7.0    266.0 )-----------( 102      ( 27 Sep 2019 00:22 )             22.4     Hgb Trend: 7.0<--, 7.0<--, 6.9<--      144  |  100  |  95<H>  ----------------------------<  103<H>  3.4<L>   |  16<L>  |  2.70<H>    Ca    7.4<L>      27 Sep 2019 00:20  Phos  7.4       Mg     1.4         TPro  7.6  /  Alb  4.4  /  TBili  0.4  /  DBili  x   /  AST  87<H>  /  ALT  13  /  AlkPhos  94      Creatinine Trend: 2.70<--, 2.79<--, 2.80<--  PT/INR - ( 26 Sep 2019 19:05 )   PT: 13.2 sec;   INR: 1.14 ratio         PTT - ( 26 Sep 2019 19:05 )  PTT:24.0 sec  Urinalysis Basic - ( 27 Sep 2019 01:46 )    Color: Yellow / Appearance: Slightly Turbid / S.018 / pH: x  Gluc: x / Ketone: Trace  / Bili: Negative / Urobili: Negative   Blood: x / Protein: 100 / Nitrite: Negative   Leuk Esterase: Moderate / RBC: 6-10 /hpf / WBC >50   Sq Epi: x / Non Sq Epi: Few / Bacteria: Many    D-dimer 9,200  Fibrinogen 448  LDH 2250  Uric acid 18.4  trop I 0.121      MICROBIOLOGY:   BCx, UCx pending    RADIOLOGY:  < from: Xray Chest 1 View- PORTABLE-Urgent (19 @ 00:16) >  EXAM:  XR CHEST PORTABLE URGENT 1V                        PROCEDURE DATE:  2019    ******PRELIMINARY REPORT******    ******PRELIMINARY REPORT******          INTERPRETATION:  Asymmetric pulmonary edema, right greater than left.    ******PRELIMINARY REPORT******      < end of copied text >    [ x] Reviewed and interpreted by me    EKG: CHIEF COMPLAINT: LOC      HPI: 73yo F with hx MPD on HU and ASA (followed by Dr. Edwina Moon at [facility]) presents from OSH for b/l SDH after found unconscious and  with 20% blasts, PLT 40. Son-in-law at bedside. Pt was found in her car yesterday and a bystander called for EMS. She was arousable, but confused, found to have R>L SDH on CTH, AO x 3, L-sided weakness on neuro exam. Her LNW was  since she lives alone. She remembers going to [place] to get oatmeal, then remembers being in the hospital. Per SONG at bedside, she is at her baseline mental status but speaking slower/less. Pt denies hx of seizure, LOC, syncope, stroke. Denies recent falls/trauma, hx of bleeding disorder or kidney disease. She takes aspirin, but no other blood thinners. Denies HA, change of vision, weakness, numbness, CP, SOB, F/C, abd pain, dysuria, photophobia, neck stiffness, N/V/D. Blood work showed  with 20% blasts, PLT 40. Given 1u PLT and DDAVP. ED course c/b fever T101.2, hypoxia to 83% on NC that improved to 89% on 5L NC, tachypnea to 30s. Placed on BIPAP with RR 27. MICU consulted for possible need for leukophoresis and hypoxia.      PAST MEDICAL & SURGICAL HISTORY:  Spleen enlarged  MDS (myelodysplastic syndrome)      FAMILY HISTORY:      SOCIAL HISTORY:  Smoking: [x ] Never Smoked [ ] Former Smoker (__ packs x ___ years) [ ] Current Smoker  (__ packs x ___ years)  Substance Use: [ x] Never Used [ ] Used ____  EtOH Use:  Marital Status: [ ] Single [ ]  [ ]  [ ]   Sexual History:   Occupation:  Recent Travel:  Country of Birth:  Advance Directives:    Allergies    Alcohol (Unknown)  No Known Drug Allergies  shellfish (Unknown)    Intolerances        HOME MEDICATIONS:  Aspirin  Hydroxyurea  Metoprolol    REVIEW OF SYSTEMS:  Constitutional: [x ] negative [ ] fevers [ ] chills [ ] weight loss [ ] weight gain  HEENT: [x ] negative [ ] dry eyes [ ] eye irritation [ ] postnasal drip [ ] nasal congestion  CV: [ x] negative  [ ] chest pain [ ] orthopnea [ ] palpitations [ ] murmur  Resp: [ x] negative [ ] cough [ ] shortness of breath [ ] dyspnea [ ] wheezing [ ] sputum [ ] hemoptysis  GI: [ x] negative [ ] nausea [ ] vomiting [ ] diarrhea [ ] constipation [ ] abd pain [ ] dysphagia   : [ x] negative [ ] dysuria [ ] nocturia [ ] hematuria [ ] increased urinary frequency  Musculoskeletal: [ ] negative [x ] back pain [ ] myalgias [ ] arthralgias [ ] fracture  Skin: [ x] negative [ ] rash [ ] itch  Neurological: see HPI  Psychiatric: x[ ] negative [ ] anxiety [ ] depression  Endocrine: [x ] negative [ ] diabetes [ ] thyroid problem  Hematologic/Lymphatic: [x ] negative [ ] anemia [ ] bleeding problem  Allergic/Immunologic: [x ] negative [ ] itchy eyes [ ] nasal discharge [ ] hives [ ] angioedema  [ ] All other systems negative  [ ] Unable to assess ROS because ________    OBJECTIVE:  ICU Vital Signs Last 24 Hrs  T(C): 38.4 (27 Sep 2019 01:31), Max: 38.4 (27 Sep 2019 01:31)  T(F): 101.2 (27 Sep 2019 01:31), Max: 101.2 (27 Sep 2019 01:31)  HR: 113 (27 Sep 2019 03:09) (101 - 122)  BP: 103/66 (27 Sep 2019 03:09) (103/66 - 135/91)  BP(mean): --  ABP: --  ABP(mean): --  RR: 14 (27 Sep 2019 03:09) (14 - 20)  SpO2: 96% (27 Sep 2019 03:09) (92% - 98%)        CAPILLARY BLOOD GLUCOSE      POCT Blood Glucose.: 163 mg/dL (26 Sep 2019 16:32)      PHYSICAL EXAM:  General: NAD, thin  Neurology: A&Ox3 (self, [facility], 2019), CN intact except will not gaze left, L juvencio-neglect, speech fluent but short responses, sensation to light touch intact, strength 4/5 on right, 3/4 on left, FTN intact RUE, pt sometimes follows commands using left side  Eyes: PERRLA, Gross vision intact  HEENT: Neck supple, trachea midline, No JVD, Gross hearing intact, cervical spine nontender, no nuchal rigidity  Respiratory: Crackles at bases, no wheeze, respirations nonlabored on O2 NC then placed on BIPAP with RR 27  CV: RRR, S1S2, no murmurs, rubs or gallops  Abdominal: Soft, NT, ND +BS, no guarding  Extremities: No edema, + peripheral pulses  Skin: No Rashes, Hematoma. Ecchymoses over arms/legs    Lines/drains/airway:     LINES:     HOSPITAL MEDICATIONS:  MEDICATIONS  (STANDING):  allopurinol 150 milliGRAM(s) Oral Once  lactated ringers. 1000 milliLiter(s) (100 mL/Hr) IV Continuous <Continuous>  sodium chloride 0.9% Bolus 1000 milliLiter(s) IV Bolus once    MEDICATIONS  (PRN):      LABS:                        7.0    266.0 )-----------( 102      ( 27 Sep 2019 00:22 )             22.4     Hgb Trend: 7.0<--, 7.0<--, 6.9<--      144  |  100  |  95<H>  ----------------------------<  103<H>  3.4<L>   |  16<L>  |  2.70<H>    Ca    7.4<L>      27 Sep 2019 00:20  Phos  7.4       Mg     1.4         TPro  7.6  /  Alb  4.4  /  TBili  0.4  /  DBili  x   /  AST  87<H>  /  ALT  13  /  AlkPhos  94      Creatinine Trend: 2.70<--, 2.79<--, 2.80<--  PT/INR - ( 26 Sep 2019 19:05 )   PT: 13.2 sec;   INR: 1.14 ratio         PTT - ( 26 Sep 2019 19:05 )  PTT:24.0 sec  Urinalysis Basic - ( 27 Sep 2019 01:46 )    Color: Yellow / Appearance: Slightly Turbid / S.018 / pH: x  Gluc: x / Ketone: Trace  / Bili: Negative / Urobili: Negative   Blood: x / Protein: 100 / Nitrite: Negative   Leuk Esterase: Moderate / RBC: 6-10 /hpf / WBC >50   Sq Epi: x / Non Sq Epi: Few / Bacteria: Many    D-dimer 9,200  Fibrinogen 448  LDH 2250  Uric acid 18.4  trop I 0.121      MICROBIOLOGY:   BCx, UCx pending    RADIOLOGY:  < from: Xray Chest 1 View- PORTABLE-Urgent (19 @ 00:16) >  EXAM:  XR CHEST PORTABLE URGENT 1V                        PROCEDURE DATE:  2019    ******PRELIMINARY REPORT******    ******PRELIMINARY REPORT******          INTERPRETATION:  Asymmetric pulmonary edema, right greater than left.    ******PRELIMINARY REPORT******      < end of copied text >    [ x] Reviewed and interpreted by me    EKG: CHIEF COMPLAINT: LOC      HPI: 73yo F with hx MPD on HU and ASA (followed by Dr. Edwina Moon at [facility]) presents from OSH for b/l SDH after found unconscious and  with 20% blasts, PLT 40. Son-in-law at bedside. Pt was found in her car yesterday unresponsive and a bystander called for EMS. She was arousable, but confused, found to have R>L SDH on CTH, AO x 3, L-sided weakness on neuro exam. Her LNW was  since she lives alone. She remembers going to [employer] to get oatmeal, then remembers being in the hospital. Per SONG at bedside, she is at her baseline mental status but speaking slower/less. Pt denies hx of seizure, LOC, syncope, stroke. Denies recent falls/trauma, hx of bleeding disorder or kidney disease. She takes aspirin, but no other blood thinners. Denies HA, change of vision, weakness, numbness, CP, SOB, F/C, abd pain, dysuria, photophobia, neck stiffness, N/V/D. Blood work showed  with 20% blasts, PLT 40. Given 1u PLT and DDAVP. ED course c/b fever T101.2, hypoxia to 83% on NC that improved to 89% on 5L NC, tachypnea to 30s. Placed on BIPAP with RR 27. MICU consulted for possible need for leukophoresis and hypoxia.      PAST MEDICAL & SURGICAL HISTORY:  Spleen enlarged  MDS (myelodysplastic syndrome)      FAMILY HISTORY:      SOCIAL HISTORY:  Smoking: [x ] Never Smoked [ ] Former Smoker (__ packs x ___ years) [ ] Current Smoker  (__ packs x ___ years)  Substance Use: [ x] Never Used [ ] Used ____  EtOH Use:  Marital Status: [ ] Single [ ]  [ ]  [ ]   Sexual History:   Occupation:  Recent Travel:  Country of Birth:  Advance Directives:    Allergies    Alcohol (Unknown)  No Known Drug Allergies  shellfish (Unknown)    Intolerances        HOME MEDICATIONS:  Aspirin  Hydroxyurea  Metoprolol    REVIEW OF SYSTEMS:  Constitutional: [x ] negative [ ] fevers [ ] chills [ ] weight loss [ ] weight gain  HEENT: [x ] negative [ ] dry eyes [ ] eye irritation [ ] postnasal drip [ ] nasal congestion  CV: [ x] negative  [ ] chest pain [ ] orthopnea [ ] palpitations [ ] murmur  Resp: [ x] negative [ ] cough [ ] shortness of breath [ ] dyspnea [ ] wheezing [ ] sputum [ ] hemoptysis  GI: [ x] negative [ ] nausea [ ] vomiting [ ] diarrhea [ ] constipation [ ] abd pain [ ] dysphagia   : [ x] negative [ ] dysuria [ ] nocturia [ ] hematuria [ ] increased urinary frequency  Musculoskeletal: [ ] negative [x ] back pain [ ] myalgias [ ] arthralgias [ ] fracture  Skin: [ x] negative [ ] rash [ ] itch  Neurological: see HPI  Psychiatric: x[ ] negative [ ] anxiety [ ] depression  Endocrine: [x ] negative [ ] diabetes [ ] thyroid problem  Hematologic/Lymphatic: [x ] negative [ ] anemia [ ] bleeding problem  Allergic/Immunologic: [x ] negative [ ] itchy eyes [ ] nasal discharge [ ] hives [ ] angioedema  [ ] All other systems negative  [ ] Unable to assess ROS because ________    OBJECTIVE:  ICU Vital Signs Last 24 Hrs  T(C): 38.4 (27 Sep 2019 01:31), Max: 38.4 (27 Sep 2019 01:31)  T(F): 101.2 (27 Sep 2019 01:31), Max: 101.2 (27 Sep 2019 01:31)  HR: 113 (27 Sep 2019 03:09) (101 - 122)  BP: 103/66 (27 Sep 2019 03:09) (103/66 - 135/91)  BP(mean): --  ABP: --  ABP(mean): --  RR: 14 (27 Sep 2019 03:09) (14 - 20)  SpO2: 96% (27 Sep 2019 03:09) (92% - 98%)        CAPILLARY BLOOD GLUCOSE      POCT Blood Glucose.: 163 mg/dL (26 Sep 2019 16:32)      PHYSICAL EXAM:  General: NAD, thin  Neurology: A&Ox3 (self, [facility], 2019), CN intact except will not gaze left, L juvencio-neglect, speech fluent but short responses, sensation to light touch intact, strength 4/5 on right, 3/4 on left, FTN intact RUE, pt sometimes follows commands using left side  Eyes: PERRLA, Gross vision intact  HEENT: Neck supple, trachea midline, No JVD, Gross hearing intact, cervical spine nontender, no nuchal rigidity  Respiratory: Crackles at bases, no wheeze, respirations nonlabored on O2 NC then placed on BIPAP with RR 27  CV: RRR, S1S2, no murmurs, rubs or gallops  Abdominal: Soft, NT, ND +BS, no guarding  Extremities: No edema, + peripheral pulses  Skin: No Rashes, Hematoma. Ecchymoses over arms/legs    Lines/drains/airway:     LINES:     HOSPITAL MEDICATIONS:  MEDICATIONS  (STANDING):  allopurinol 150 milliGRAM(s) Oral Once  lactated ringers. 1000 milliLiter(s) (100 mL/Hr) IV Continuous <Continuous>  sodium chloride 0.9% Bolus 1000 milliLiter(s) IV Bolus once    MEDICATIONS  (PRN):      LABS:                        7.0    266.0 )-----------( 102      ( 27 Sep 2019 00:22 )             22.4     Hgb Trend: 7.0<--, 7.0<--, 6.9<--      144  |  100  |  95<H>  ----------------------------<  103<H>  3.4<L>   |  16<L>  |  2.70<H>    Ca    7.4<L>      27 Sep 2019 00:20  Phos  7.4       Mg     1.4         TPro  7.6  /  Alb  4.4  /  TBili  0.4  /  DBili  x   /  AST  87<H>  /  ALT  13  /  AlkPhos  94      Creatinine Trend: 2.70<--, 2.79<--, 2.80<--  PT/INR - ( 26 Sep 2019 19:05 )   PT: 13.2 sec;   INR: 1.14 ratio         PTT - ( 26 Sep 2019 19:05 )  PTT:24.0 sec  Urinalysis Basic - ( 27 Sep 2019 01:46 )    Color: Yellow / Appearance: Slightly Turbid / S.018 / pH: x  Gluc: x / Ketone: Trace  / Bili: Negative / Urobili: Negative   Blood: x / Protein: 100 / Nitrite: Negative   Leuk Esterase: Moderate / RBC: 6-10 /hpf / WBC >50   Sq Epi: x / Non Sq Epi: Few / Bacteria: Many    D-dimer 9,200  Fibrinogen 448  LDH 2250  Uric acid 18.4  trop I 0.121      MICROBIOLOGY:   BCx, UCx pending    RADIOLOGY:  < from: Xray Chest 1 View- PORTABLE-Urgent (19 @ 00:16) >  EXAM:  XR CHEST PORTABLE URGENT 1V                        PROCEDURE DATE:  2019    ******PRELIMINARY REPORT******    ******PRELIMINARY REPORT******          INTERPRETATION:  Asymmetric pulmonary edema, right greater than left.    ******PRELIMINARY REPORT******      < end of copied text >    [ x] Reviewed and interpreted by me    EKG: CHIEF COMPLAINT: LOC      HPI: 75yo F with hx MPD on HU and ASA (followed by Dr. Edwina Moon at [facility]) presents from OSH for b/l SDH after found unconscious and  with 20% blasts, PLT 40. Son-in-law at bedside. Pt was found in her car yesterday unresponsive and a bystander called for EMS. She was arousable, but confused, found to have R>L SDH on CTH, AO x 3, L-sided weakness on neuro exam. Her LNW was  since she lives alone. She remembers going to [place] to get oatmeal, then remembers being in the hospital. Per SONG at bedside, she is at her baseline mental status but speaking slower/less. Pt denies hx of seizure, LOC, syncope, stroke. Denies recent falls/trauma, hx of bleeding disorder or kidney disease. She takes aspirin, but no other blood thinners. Denies HA, change of vision, weakness, numbness, CP, SOB, F/C, abd pain, dysuria, photophobia, neck stiffness, N/V/D. Blood work showed  with 20% blasts, PLT 40. Given 1u PLT and DDAVP. ED course c/b fever T101.2, hypoxia to 83% on NC that improved to 89% on 5L NC, tachypnea to 30s. Placed on BIPAP with RR 27. MICU consulted for possible need for leukophoresis and hypoxia.      PAST MEDICAL & SURGICAL HISTORY:  Spleen enlarged  MDS (myelodysplastic syndrome)      FAMILY HISTORY:      SOCIAL HISTORY:  Smoking: [x ] Never Smoked [ ] Former Smoker (__ packs x ___ years) [ ] Current Smoker  (__ packs x ___ years)  Substance Use: [ x] Never Used [ ] Used ____  EtOH Use:  Marital Status: [ ] Single [ ]  [ ]  [ ]   Sexual History:   Occupation:  Recent Travel:  Country of Birth:  Advance Directives:    Allergies    Alcohol (Unknown)  No Known Drug Allergies  shellfish (Unknown)    Intolerances        HOME MEDICATIONS:  Aspirin  Hydroxyurea  Metoprolol    REVIEW OF SYSTEMS:  Constitutional: [x ] negative [ ] fevers [ ] chills [ ] weight loss [ ] weight gain  HEENT: [x ] negative [ ] dry eyes [ ] eye irritation [ ] postnasal drip [ ] nasal congestion  CV: [ x] negative  [ ] chest pain [ ] orthopnea [ ] palpitations [ ] murmur  Resp: [ x] negative [ ] cough [ ] shortness of breath [ ] dyspnea [ ] wheezing [ ] sputum [ ] hemoptysis  GI: [ x] negative [ ] nausea [ ] vomiting [ ] diarrhea [ ] constipation [ ] abd pain [ ] dysphagia   : [ x] negative [ ] dysuria [ ] nocturia [ ] hematuria [ ] increased urinary frequency  Musculoskeletal: [ ] negative [x ] back pain [ ] myalgias [ ] arthralgias [ ] fracture  Skin: [ x] negative [ ] rash [ ] itch  Neurological: see HPI  Psychiatric: x[ ] negative [ ] anxiety [ ] depression  Endocrine: [x ] negative [ ] diabetes [ ] thyroid problem  Hematologic/Lymphatic: [x ] negative [ ] anemia [ ] bleeding problem  Allergic/Immunologic: [x ] negative [ ] itchy eyes [ ] nasal discharge [ ] hives [ ] angioedema  [ ] All other systems negative  [ ] Unable to assess ROS because ________    OBJECTIVE:  ICU Vital Signs Last 24 Hrs  T(C): 38.4 (27 Sep 2019 01:31), Max: 38.4 (27 Sep 2019 01:31)  T(F): 101.2 (27 Sep 2019 01:31), Max: 101.2 (27 Sep 2019 01:31)  HR: 113 (27 Sep 2019 03:09) (101 - 122)  BP: 103/66 (27 Sep 2019 03:09) (103/66 - 135/91)  BP(mean): --  ABP: --  ABP(mean): --  RR: 14 (27 Sep 2019 03:09) (14 - 20)  SpO2: 96% (27 Sep 2019 03:09) (92% - 98%)        CAPILLARY BLOOD GLUCOSE      POCT Blood Glucose.: 163 mg/dL (26 Sep 2019 16:32)      PHYSICAL EXAM:  General: NAD, thin  Neurology: A&Ox3 (self, [facility], 2019), CN intact except will not gaze left, L juvencio-neglect, speech fluent but short responses, sensation to light touch intact, strength 4/5 on right, 3/4 on left, FTN intact RUE, pt sometimes follows commands using left side  Eyes: PERRLA, Gross vision intact  HEENT: Neck supple, trachea midline, No JVD, Gross hearing intact, cervical spine nontender, no nuchal rigidity  Respiratory: Crackles at bases, no wheeze, respirations nonlabored on O2 NC then placed on BIPAP with RR 27  CV: RRR, S1S2, no murmurs, rubs or gallops  Abdominal: Soft, NT, ND +BS, no guarding  Extremities: No edema, + peripheral pulses  Skin: No Rashes, Hematoma. Ecchymoses over arms/legs    Lines/drains/airway:     LINES:     HOSPITAL MEDICATIONS:  MEDICATIONS  (STANDING):  allopurinol 150 milliGRAM(s) Oral Once  lactated ringers. 1000 milliLiter(s) (100 mL/Hr) IV Continuous <Continuous>  sodium chloride 0.9% Bolus 1000 milliLiter(s) IV Bolus once    MEDICATIONS  (PRN):      LABS:                        7.0    266.0 )-----------( 102      ( 27 Sep 2019 00:22 )             22.4     Hgb Trend: 7.0<--, 7.0<--, 6.9<--      144  |  100  |  95<H>  ----------------------------<  103<H>  3.4<L>   |  16<L>  |  2.70<H>    Ca    7.4<L>      27 Sep 2019 00:20  Phos  7.4       Mg     1.4         TPro  7.6  /  Alb  4.4  /  TBili  0.4  /  DBili  x   /  AST  87<H>  /  ALT  13  /  AlkPhos  94      Creatinine Trend: 2.70<--, 2.79<--, 2.80<--  PT/INR - ( 26 Sep 2019 19:05 )   PT: 13.2 sec;   INR: 1.14 ratio         PTT - ( 26 Sep 2019 19:05 )  PTT:24.0 sec  Urinalysis Basic - ( 27 Sep 2019 01:46 )    Color: Yellow / Appearance: Slightly Turbid / S.018 / pH: x  Gluc: x / Ketone: Trace  / Bili: Negative / Urobili: Negative   Blood: x / Protein: 100 / Nitrite: Negative   Leuk Esterase: Moderate / RBC: 6-10 /hpf / WBC >50   Sq Epi: x / Non Sq Epi: Few / Bacteria: Many    D-dimer 9,200  Fibrinogen 448  LDH 2250  Uric acid 18.4  trop I 0.121      MICROBIOLOGY:   BCx, UCx pending    RADIOLOGY:  < from: CT Head No Cont (19 @ 21:26) >  IMPRESSION:   Stable appearance of the right > left holohemispheric subdural hematomas   and falcine subdural hematoma.     These findings were discussed with Dr. Goldberger at 2019 9:53 PM by   Dr. Julienne Gonzalez ofRadiology with read back confirmation.  < end of copied text >      < from: Xray Chest 1 View- PORTABLE-Urgent (19 @ 00:16) >  EXAM:  XR CHEST PORTABLE URGENT 1V                        PROCEDURE DATE:  2019    ******PRELIMINARY REPORT******    ******PRELIMINARY REPORT******          INTERPRETATION:  Asymmetric pulmonary edema, right greater than left.    ******PRELIMINARY REPORT******      < end of copied text >    [ x] Reviewed and interpreted by me    EKG: CHIEF COMPLAINT: LOC      HPI: 73yo F with hx MPD on HU and ASA (followed by Dr. Edwina Moon at [facility]) presents from OSH for b/l SDH after found unconscious and  with 20% blasts, PLT 40. Son-in-law at bedside. Pt was found in her car yesterday unresponsive and a bystander called for EMS. She was arousable, but confused, found to have R>L SDH on CTH, AO x 3, L-sided weakness on neuro exam. Her LNW was  since she lives alone. She remembers going to [place] to get oatmeal, then remembers being in the hospital. Per SONG at bedside, she is at her baseline mental status but speaking slower/less. Pt denies hx of seizure, LOC, syncope, stroke. Denies recent falls/trauma, hx of bleeding disorder or kidney disease. She takes aspirin, but no other blood thinners. Denies HA, change of vision, weakness, numbness, CP, SOB, F/C, abd pain, dysuria, photophobia, neck stiffness, N/V/D. Blood work showed  with 20% blasts, PLT 40. Given 1u PLT and DDAVP. ED course c/b fever T101.2, hypoxia to 83% on NC that improved to 89% on 5L NC, tachypnea to 30s. Placed on BIPAP with RR 27. MICU consulted for possible need for leukophoresis and hypoxia.      PAST MEDICAL & SURGICAL HISTORY:  Spleen enlarged  MDS (myelodysplastic syndrome)      FAMILY HISTORY:      SOCIAL HISTORY:  Smoking: [x ] Never Smoked [ ] Former Smoker (__ packs x ___ years) [ ] Current Smoker  (__ packs x ___ years)  Substance Use: [ x] Never Used [ ] Used ____  EtOH Use:  Marital Status: [ ] Single [ ]  [ ]  [ ]   Sexual History:   Occupation:  Recent Travel:  Country of Birth:  Advance Directives:    Allergies    Alcohol (Unknown)  No Known Drug Allergies  shellfish (Unknown)    Intolerances        HOME MEDICATIONS:  Aspirin  Hydroxyurea  Metoprolol    REVIEW OF SYSTEMS:  Constitutional: [x ] negative [ ] fevers [ ] chills [ ] weight loss [ ] weight gain  HEENT: [x ] negative [ ] dry eyes [ ] eye irritation [ ] postnasal drip [ ] nasal congestion  CV: [ x] negative  [ ] chest pain [ ] orthopnea [ ] palpitations [ ] murmur  Resp: [ x] negative [ ] cough [ ] shortness of breath [ ] dyspnea [ ] wheezing [ ] sputum [ ] hemoptysis  GI: [ x] negative [ ] nausea [ ] vomiting [ ] diarrhea [ ] constipation [ ] abd pain [ ] dysphagia   : [ x] negative [ ] dysuria [ ] nocturia [ ] hematuria [ ] increased urinary frequency  Musculoskeletal: [ ] negative [x ] back pain [ ] myalgias [ ] arthralgias [ ] fracture  Skin: [ x] negative [ ] rash [ ] itch  Neurological: see HPI  Psychiatric: x[ ] negative [ ] anxiety [ ] depression  Endocrine: [x ] negative [ ] diabetes [ ] thyroid problem  Hematologic/Lymphatic: [x ] negative [ ] anemia [ ] bleeding problem  Allergic/Immunologic: [x ] negative [ ] itchy eyes [ ] nasal discharge [ ] hives [ ] angioedema  [ ] All other systems negative  [ ] Unable to assess ROS because ________    OBJECTIVE:  ICU Vital Signs Last 24 Hrs  T(C): 38.4 (27 Sep 2019 01:31), Max: 38.4 (27 Sep 2019 01:31)  T(F): 101.2 (27 Sep 2019 01:31), Max: 101.2 (27 Sep 2019 01:31)  HR: 113 (27 Sep 2019 03:09) (101 - 122)  BP: 103/66 (27 Sep 2019 03:09) (103/66 - 135/91)  BP(mean): --  ABP: --  ABP(mean): --  RR: 14 (27 Sep 2019 03:09) (14 - 20)  SpO2: 96% (27 Sep 2019 03:09) (92% - 98%)        CAPILLARY BLOOD GLUCOSE      POCT Blood Glucose.: 163 mg/dL (26 Sep 2019 16:32)      PHYSICAL EXAM:  General: NAD, thin  Neurology: A&Ox3 (self, [facility], 2019), CN intact except will not gaze left, L juvencio-neglect, speech fluent but short responses, sensation to light touch intact, strength 4/5 on right, 3/4 on left, FTN intact RUE, pt sometimes follows commands using left side  Eyes: PERRLA, Gross vision intact  HEENT: Neck supple, trachea midline, No JVD, Gross hearing intact, cervical spine nontender, no nuchal rigidity  Respiratory: Crackles at bases, no wheeze, respirations nonlabored on O2 NC then placed on BIPAP with RR 27  CV: RRR, S1S2, no murmurs, rubs or gallops  Abdominal: Soft, NT, ND +BS, no guarding  Extremities: No edema, + peripheral pulses  Skin: No Rashes, Hematoma. Ecchymoses over arms/legs    Lines/drains/airway:     LINES:     HOSPITAL MEDICATIONS:  MEDICATIONS  (STANDING):  allopurinol 150 milliGRAM(s) Oral Once  lactated ringers. 1000 milliLiter(s) (100 mL/Hr) IV Continuous <Continuous>  sodium chloride 0.9% Bolus 1000 milliLiter(s) IV Bolus once    MEDICATIONS  (PRN):      LABS:                        7.0    266.0 )-----------( 102      ( 27 Sep 2019 00:22 )             22.4     Hgb Trend: 7.0<--, 7.0<--, 6.9<--      144  |  100  |  95<H>  ----------------------------<  103<H>  3.4<L>   |  16<L>  |  2.70<H>    Ca    7.4<L>      27 Sep 2019 00:20  Phos  7.4       Mg     1.4         TPro  7.6  /  Alb  4.4  /  TBili  0.4  /  DBili  x   /  AST  87<H>  /  ALT  13  /  AlkPhos  94      Creatinine Trend: 2.70<--, 2.79<--, 2.80<--  PT/INR - ( 26 Sep 2019 19:05 )   PT: 13.2 sec;   INR: 1.14 ratio         PTT - ( 26 Sep 2019 19:05 )  PTT:24.0 sec  Urinalysis Basic - ( 27 Sep 2019 01:46 )    Color: Yellow / Appearance: Slightly Turbid / S.018 / pH: x  Gluc: x / Ketone: Trace  / Bili: Negative / Urobili: Negative   Blood: x / Protein: 100 / Nitrite: Negative   Leuk Esterase: Moderate / RBC: 6-10 /hpf / WBC >50   Sq Epi: x / Non Sq Epi: Few / Bacteria: Many    Blood Gas Profile - Arterial (19 @ 04:55)    pH, Arterial: 7.37    pCO2, Arterial: 24 mmHg    pO2, Arterial: 58 mmHg    HCO3, Arterial: 13 mmol/L    Base Excess, Arterial: -10.7 mmol/L    Oxygen Saturation, Arterial: 86 %    Total CO2, Arterial: 14 mmoL/L    Blood Gas Source Arterial: Arterial    D-dimer 9,200  Fibrinogen 448  LDH 2250  Uric acid 18.4  trop I 0.121      MICROBIOLOGY:   BCx, UCx pending    RADIOLOGY:  < from: CT Head No Cont (19 @ 21:26) >  IMPRESSION:   Stable appearance of the right > left holohemispheric subdural hematomas   and falcine subdural hematoma.     These findings were discussed with Dr. Goldberger at 2019 9:53 PM by   Dr. Julienne Gonzalez ofRadiology with read back confirmation.  < end of copied text >      < from: Xray Chest 1 View- PORTABLE-Urgent (19 @ 00:16) >  EXAM:  XR CHEST PORTABLE URGENT 1V                        PROCEDURE DATE:  2019    ******PRELIMINARY REPORT******    ******PRELIMINARY REPORT******          INTERPRETATION:  Asymmetric pulmonary edema, right greater than left.    ******PRELIMINARY REPORT******      < end of copied text >    [ x] Reviewed and interpreted by me    EKG: CHIEF COMPLAINT: LOC    HPI: 75yo F with hx MPD on HU and ASA (followed by Dr. Edwina Moon at [facility]) presents from OSH for b/l SDH after found unconscious and  with 20% blasts, PLT 40. Son-in-law at bedside. Pt was found in her car yesterday unresponsive and a bystander called for EMS. She was arousable, but confused, found to have R>L SDH on CTH, AO x 3, L-sided weakness on neuro exam. Her LNW was  since she lives alone. She remembers going to [place] to get oatmeal, then remembers being in the hospital. Per SONG at bedside, she is at her baseline mental status but speaking slower/less. Pt denies hx of seizure, LOC, syncope, stroke. Denies recent falls/trauma, hx of bleeding disorder or kidney disease. She takes aspirin, but no other blood thinners. Denies HA, change of vision, weakness, numbness, CP, SOB, F/C, abd pain, dysuria, photophobia, neck stiffness, N/V/D. Blood work showed  with 20% blasts, PLT 40. Given 1u PLT and DDAVP. ED course c/b fever T101.2, hypoxia to 83% on NC that improved to 89% on 5L NC, tachypnea to 30s. Placed on BIPAP with RR 27. MICU consulted for possible need for leukophoresis and hypoxia.      PAST MEDICAL & SURGICAL HISTORY:  Spleen enlarged  MDS (myelodysplastic syndrome)      FAMILY HISTORY:      SOCIAL HISTORY:  Smoking: [x ] Never Smoked [ ] Former Smoker (__ packs x ___ years) [ ] Current Smoker  (__ packs x ___ years)  Substance Use: [ x] Never Used [ ] Used ____  EtOH Use:  Marital Status: [ ] Single [ ]  [ ]  [ ]   Sexual History:   Occupation:  Recent Travel:  Country of Birth:  Advance Directives:    Allergies    Alcohol (Unknown)  No Known Drug Allergies  shellfish (Unknown)    Intolerances        HOME MEDICATIONS:  Aspirin  Hydroxyurea  Metoprolol    REVIEW OF SYSTEMS:  Constitutional: [x ] negative [ ] fevers [ ] chills [ ] weight loss [ ] weight gain  HEENT: [x ] negative [ ] dry eyes [ ] eye irritation [ ] postnasal drip [ ] nasal congestion  CV: [ x] negative  [ ] chest pain [ ] orthopnea [ ] palpitations [ ] murmur  Resp: [ x] negative [ ] cough [ ] shortness of breath [ ] dyspnea [ ] wheezing [ ] sputum [ ] hemoptysis  GI: [ x] negative [ ] nausea [ ] vomiting [ ] diarrhea [ ] constipation [ ] abd pain [ ] dysphagia   : [ x] negative [ ] dysuria [ ] nocturia [ ] hematuria [ ] increased urinary frequency  Musculoskeletal: [ ] negative [x ] back pain [ ] myalgias [ ] arthralgias [ ] fracture  Skin: [ x] negative [ ] rash [ ] itch  Neurological: see HPI  Psychiatric: x[ ] negative [ ] anxiety [ ] depression  Endocrine: [x ] negative [ ] diabetes [ ] thyroid problem  Hematologic/Lymphatic: [x ] negative [ ] anemia [ ] bleeding problem  Allergic/Immunologic: [x ] negative [ ] itchy eyes [ ] nasal discharge [ ] hives [ ] angioedema  [ ] All other systems negative  [ ] Unable to assess ROS because ________    OBJECTIVE:  ICU Vital Signs Last 24 Hrs  T(C): 38.4 (27 Sep 2019 01:31), Max: 38.4 (27 Sep 2019 01:31)  T(F): 101.2 (27 Sep 2019 01:31), Max: 101.2 (27 Sep 2019 01:31)  HR: 113 (27 Sep 2019 03:09) (101 - 122)  BP: 103/66 (27 Sep 2019 03:09) (103/66 - 135/91)  BP(mean): --  ABP: --  ABP(mean): --  RR: 14 (27 Sep 2019 03:09) (14 - 20)  SpO2: 96% (27 Sep 2019 03:09) (92% - 98%)        CAPILLARY BLOOD GLUCOSE      POCT Blood Glucose.: 163 mg/dL (26 Sep 2019 16:32)      PHYSICAL EXAM:  General: NAD, thin  Neurology: A&Ox3 (self, [facility], 2019), CN intact except will not gaze left, L juvencio-neglect, speech fluent but short responses, sensation to light touch intact, strength 4/5 on right, 3/4 on left, FTN intact RUE, pt sometimes follows commands using left side  Eyes: PERRLA, Gross vision intact  HEENT: Neck supple, trachea midline, No JVD, Gross hearing intact, cervical spine nontender, no nuchal rigidity  Respiratory: Crackles at bases, no wheeze, respirations nonlabored on O2 NC then placed on BIPAP with RR 27  CV: RRR, S1S2, no murmurs, rubs or gallops  Abdominal: Soft, NT, ND +BS, no guarding  Extremities: No edema, + peripheral pulses  Skin: No Rashes, Hematoma. Ecchymoses over arms/legs    Lines/drains/airway:     LINES:     HOSPITAL MEDICATIONS:  MEDICATIONS  (STANDING):  allopurinol 150 milliGRAM(s) Oral Once  lactated ringers. 1000 milliLiter(s) (100 mL/Hr) IV Continuous <Continuous>  sodium chloride 0.9% Bolus 1000 milliLiter(s) IV Bolus once    MEDICATIONS  (PRN):      LABS:                        7.0    266.0 )-----------( 102      ( 27 Sep 2019 00:22 )             22.4     Hgb Trend: 7.0<--, 7.0<--, 6.9<--      144  |  100  |  95<H>  ----------------------------<  103<H>  3.4<L>   |  16<L>  |  2.70<H>    Ca    7.4<L>      27 Sep 2019 00:20  Phos  7.4       Mg     1.4         TPro  7.6  /  Alb  4.4  /  TBili  0.4  /  DBili  x   /  AST  87<H>  /  ALT  13  /  AlkPhos  94      Creatinine Trend: 2.70<--, 2.79<--, 2.80<--  PT/INR - ( 26 Sep 2019 19:05 )   PT: 13.2 sec;   INR: 1.14 ratio         PTT - ( 26 Sep 2019 19:05 )  PTT:24.0 sec  Urinalysis Basic - ( 27 Sep 2019 01:46 )    Color: Yellow / Appearance: Slightly Turbid / S.018 / pH: x  Gluc: x / Ketone: Trace  / Bili: Negative / Urobili: Negative   Blood: x / Protein: 100 / Nitrite: Negative   Leuk Esterase: Moderate / RBC: 6-10 /hpf / WBC >50   Sq Epi: x / Non Sq Epi: Few / Bacteria: Many    Blood Gas Profile - Arterial (19 @ 04:55)    pH, Arterial: 7.37    pCO2, Arterial: 24 mmHg    pO2, Arterial: 58 mmHg    HCO3, Arterial: 13 mmol/L    Base Excess, Arterial: -10.7 mmol/L    Oxygen Saturation, Arterial: 86 %    Total CO2, Arterial: 14 mmoL/L    Blood Gas Source Arterial: Arterial    D-dimer 9,200  Fibrinogen 448  LDH 2250  Uric acid 18.4  trop I 0.121      MICROBIOLOGY:   BCx, UCx pending    RADIOLOGY:  < from: CT Head No Cont (19 @ 21:26) >  IMPRESSION:   Stable appearance of the right > left holohemispheric subdural hematomas   and falcine subdural hematoma.     These findings were discussed with Dr. Goldberger at 2019 9:53 PM by   Dr. Julienne Gonzalez ofRadiology with read back confirmation.  < end of copied text >      < from: Xray Chest 1 View- PORTABLE-Urgent (19 @ 00:16) >  EXAM:  XR CHEST PORTABLE URGENT 1V                        PROCEDURE DATE:  2019    ******PRELIMINARY REPORT******    ******PRELIMINARY REPORT******          INTERPRETATION:  Asymmetric pulmonary edema, right greater than left.    ******PRELIMINARY REPORT******      < end of copied text >    [ x] Reviewed and interpreted by me    EKG:

## 2019-09-27 NOTE — H&P ADULT - NSHPPHYSICALEXAM_GEN_ALL_CORE
Vital Signs Last 24 Hrs  T(C): 38 (27 Sep 2019 04:33), Max: 38.4 (27 Sep 2019 01:31)  T(F): 100.4 (27 Sep 2019 04:33), Max: 101.2 (27 Sep 2019 01:31)  HR: 109 (27 Sep 2019 04:33) (101 - 122)  BP: 119/77 (27 Sep 2019 04:33) (103/66 - 135/91)  BP(mean): --  RR: 14 (27 Sep 2019 04:33) (14 - 20)  SpO2: 93% (27 Sep 2019 04:33) (92% - 98%)    Physical Exam:  Gen: Alert, tachypneic  HEENT: NCAT, PERRL, EOMI, clear conjunctiva, no scleral icterus, mmm  Neck: Supple, no JVD, no LAD  CV: tachycardic in 110s, S1S2, no m/r/g  Resp: tachypneic, no accessory muscle use, bibasilar crackles  Abd: Soft, NT, ND, normal bowel sounds  : leon in place, turbid urine  Ext: + peripheral pulses, no clubbing, cyanosis or edema  Neuro: AOx2-3, CN2-12 grossly intact, uncooperative with exam, COOPER  Skin: small scattered ecchymoses on extremities and chest Vital Signs Last 24 Hrs  T(C): 38 (27 Sep 2019 04:33), Max: 38.4 (27 Sep 2019 01:31)  T(F): 100.4 (27 Sep 2019 04:33), Max: 101.2 (27 Sep 2019 01:31)  HR: 109 (27 Sep 2019 04:33) (101 - 122)  BP: 119/77 (27 Sep 2019 04:33) (103/66 - 135/91)  BP(mean): --  RR: 14 (27 Sep 2019 04:33) (14 - 20)  SpO2: 93% (27 Sep 2019 04:33) (92% - 98%)    Physical Exam:  Gen: Alert, tachypneic  HEENT: NCAT, PERRL, EOMI, clear conjunctiva, no scleral icterus, mmm  Neck: Supple, no JVD, no LAD  Heart: tachycardic in 110s, S1S2, no m/r/g  Lung: tachypneic, no accessory muscle use, bibasilar crackles  Abdomen: Soft, NT, ND, normal bowel sounds +fullness LUQ.   : leon in place, turbid urine  Ext: + peripheral pulses, no clubbing, cyanosis or edema  Neuro: AOx2-3, CN2-12 grossly intact, uncooperative with exam, COOPER  Skin: small scattered ecchymoses on extremities and chest

## 2019-09-27 NOTE — H&P ADULT - PROBLEM SELECTOR PLAN 6
WBC 260s with 20% blasts  - fibrinogen wnl  - HIV negative  - f/u hep B and C serology  - check flow cytometry  - check records from Dr. Moon at [facility] including BMBx  - monitor CBC, CMP, mg, phos, uric acid, LDH BID  - monitor CBC daily  - c/w hydroxyurea 2g BID  - c/w allopurinal 150mg daily  - c/w LR@100cc/hr and lasix PRN  - f/u heme recs including regarding rasburicase; per discussion with heme fellow no plan for urgent plasmapheresis WBC 260s with 20% blasts  - fibrinogen wnl  - HIV negative  - f/u hep B and C serology  - check flow cytometry  - check TTE  - check records from Dr. Moon at [facility] including BMBx  - c/w hydroxyurea 2g BID  - c/w allopurinal 150mg daily  - c/w LR@100cc/hr and lasix PRN  - monitor CBC, CMP, mg, phos, uric acid, LDH BID  - monitor CBC daily  - f/u heme recs including regarding rasburicase; per discussion with heme fellow no plan for urgent leukophoresis WBC 260s with 20% blasts  - uric acid 18.4, phos 7.4, LDH 2250 (heme aware)  - fibrinogen wnl  - HIV negative  - f/u hep B and C serology  - check flow cytometry  - check TTE  - check records from Dr. Moon at [facility] including BMBx  - c/w hydroxyurea 2g BID  - c/w allopurinal 150mg daily  - c/w LR@100cc/hr and lasix PRN  - monitor CBC, CMP, mg, phos, uric acid, LDH BID  - monitor CBC daily  - f/u heme recs including regarding rasburicase; per discussion with heme fellow no plan for urgent leukophoresis WBC 260s with 20% blasts  - uric acid 18.4, phos 7.4, LDH 2250, Ca 6 (heme aware) but K low  - fibrinogen wnl  - HIV negative  - f/u hep B and C serology  - check flow cytometry  - check TTE  - check records from Dr. Moon at [facility] including BMBx  - c/w hydroxyurea 2g BID  - c/w allopurinal 150mg daily  - c/w LR@100cc/hr and lasix PRN  - monitor CBC, CMP, mg, phos, uric acid, LDH BID  - monitor CBC daily  - f/u heme recs including regarding rasburicase; per discussion with heme fellow no plan for urgent leukophoresis WBC 260s with 20% blasts  - uric acid 18.4, phos 7.4, LDH 2250, Ca 6 (heme aware) but K low  - fibrinogen wnl  - HIV negative  - f/u hep B and C serology  - check flow cytometry  - check TTE  - check records from Dr. Moon at [facility] including BMBx  - c/w hydroxyurea 2g BID  - c/w allopurinol 150mg daily  - c/w LR@100cc/hr and lasix PRN  - monitor CBC, CMP, mg, phos, uric acid, LDH BID  - monitor CBC daily  - f/u heme recs including regarding rasburicase; per discussion with heme fellow no plan for urgent leukophoresis WBC 260s with 20% blasts  - uric acid 18.4, phos 7.4, LDH 2250, Ca 6 (heme aware) but K low  - fibrinogen wnl  - HIV negative  - f/u hep B and C serology  - check flow cytometry  - check TTE  - check records from Dr. Moon at [facility] including BMBx  - c/w hydroxyurea 2g BID  - c/w allopurinol 150mg daily  - c/w LR@100cc/hr and lasix PRN  - monitor CBC, CMP, mg, phos, uric acid, LDH BID  - monitor CBC daily  - f/u heme recs including regarding rasburicase; per discussion with heme fellow no plan for urgent leukophoresis. rasburicase IV now ordered per heme team. WBC 260s with 20% blasts; Per heme/onc team suspecting blast crisis, will need to f/u prior bm bx.  - uric acid 18.4, phos 7.4, LDH 2250, Ca 6 (heme aware) but K low  - fibrinogen wnl  - HIV negative  - f/u hep B and C serology  - check flow cytometry  - check TTE  - check records from Dr. Moon at [facility] including BMBx  - c/w hydroxyurea 2g BID  - c/w allopurinol 150mg daily  - c/w LR@100cc/hr and lasix PRN  - monitor CBC, CMP, mg, phos, uric acid, LDH BID  - monitor CBC daily  - f/u heme recs including regarding rasburicase; per discussion with heme fellow no plan for urgent leukophoresis. rasburicase IV now ordered per heme team.  -per heme/onc team, no indication for urgent leukopheresis overnight; however, pt with noted assymetrical pulm edema on imaging and pocus confirming likely noncardiogenic pulm edema; will need to f/u ongoing heme/onc recs regarding decision for leukopheresis, if required will need upgrade to MICU.

## 2019-09-27 NOTE — H&P ADULT - PROBLEM SELECTOR PLAN 2
T101.2 rectal, HR 100s-120s, RR up to 20s-30s, leukocytosis, lactate 4 with positive UA  - c/w zosyn for now  - f/u UCx T101.2 rectal, HR 100s-120s, RR up to 20s-30s, leukocytosis, lactate 4 with positive UA  - c/w zosyn for now  - f/u UCx and BCx in lab  - repeat lactate T101.2 rectal, HR 100s-120s, RR up to 20s-30s, leukocytosis, lactate 4 with positive UA  - c/w zosyn q12 for now  - f/u UCx and BCx in lab  - repeat lactate T101.2 rectal, HR 100s-120s, RR up to 20s-30s, leukocytosis, lactate 4 with positive UA vs aspiration  - c/w zosyn q12 for now  - f/u UCx and BCx in lab  - repeat lactate T101.2 rectal, HR 100s-120s, RR up to 20s-30s, leukocytosis, lactate 4 with positive UA vs aspiration  - c/w zosyn q12 for now  - f/u UCx and BCx in lab  - obtain CT C/A/P to eval infectious source, non-con given renal insufficiency  - repeat lactate T101.2 rectal, HR 100s-120s, RR up to 20s-30s, leukocytosis, lactate 4 with positive UA vs aspiration given history.   - c/w zosyn q12 for now  - f/u UCx and BCx in lab  - obtain CT C/A/P to eval infectious source, non-con given renal insufficiency  - repeat lactate  -c/w ivf as part of TLS tx.

## 2019-09-27 NOTE — PROCEDURE NOTE - SUPERVISORY STATEMENT
bone marrow aspiration is attempted three times: dry tap; biopsy performed successfully left iliac crest.  Patient moving in bed struck her left calf on bed rail; small skin tear noted and dressed with 4 X. Reported to family. According to daughter (physician) patient has been losing weight and has thin friable skin

## 2019-09-27 NOTE — PROGRESS NOTE ADULT - ASSESSMENT
74 year old female with history of MPD on HU and ASA followed by Dr. Edwina Moon([phone]) was brought to [place] ED by EMS after she had syncope and was found to have SDH for which she was transferred to [facility] for further care. At ED, her WBC was 238 with 20% blasts and hematology was consulted for possible leukemia.       # Acute leukemia   # History of MPD   Her baseline PS is 1. Per patient family, she has MPD and being followed by Dr. Dr. Moon at [facility] but details was not clear. On HU and ASA but she stopped taking it last week after she had episode of epistaxis. She knows that she has splenomegaly. Peripheral smear shows approximately 15-20% blasts without monie rods. No increase of eosinophil. Patient developed worsening dyspnea overnight and was found to have new onset bilateral airspace opacities- transferred to MICU.    -Stop ASA  -Start hydroxyurea 2g BID  -Start allopurinol 150mg daily (stat now and start tomorrow morning)  -Start IVF@100cc/hr (PRN Lasix)  -Please send fibrinogen, LDH, HIV, HCV antibody, HBVc antibody, HBVs antigen  -Check CMP, P, uric acid, Mg, LDH BID for now  -Check CBC at least daily for now  -Echo or MUGA   -Hematology consultation team will continue to follow  -Will send peripheral flow cytometry (Need one green top)  -Please request record from Dr. Dr. Moon (Inclusing previous BMBx result)      # New SDH, most likely traumatic  1cm on R and 3mm on L. no midline shift  -Plt transfusion PRN  -Neurosurgery is following    # ADITHYA vs CKD vs ADITHYA on CKD   There is no baseline. Cre 2.7 on admission.   -Monitor Cre  -Please request record from PCP and hematologist 74 year old female with history of MPD on HU and ASA followed by Dr. Edwina Moon([phone]) was brought to [place] ED by EMS after she had syncope and was found to have SDH for which she was transferred to [facility] for further care. At ED, her WBC was 238 with 20% blasts and hematology was consulted for possible leukemia.       # Acute leukemia   # History of MPD   Her baseline PS is 1. Per patient family, she has MPD and being followed by Dr. Dr. Moon at [facility] but details was not clear. On HU and ASA but she stopped taking it last week after she had episode of epistaxis. She knows that she has splenomegaly. Peripheral smear shows approximately 15-20% blasts without monie rods. No increase of eosinophil. Patient developed worsening dyspnea overnight and was found to have new onset bilateral airspace opacities- transferred to MICU.    -BM biopsy and aspiration was done at bedside, today, will f/u the path result.  -f/u the result of peripheral flow cytometry   -c/w hydroxyurea 2g BID and allopurinol 150mg daily.   -c/w IVF@100cc/hr with PRN Lasix  -Uric acid of 18.4 with LDH of 6000's- suspected tumor lysis, s/p 6mg IV Rasburicase this AM. F/U TLS labs daily.  -Negative HIV screen and hep B/C panel.  -Echo or MUGA   -Request record from Dr. Dr. Moon (Inclusing previous BMBx result)      # New SDH, most likely traumatic  -1cm on R and 3mm on L. no midline shift  -Plt transfusion PRN  -Neurosurgery is following, appreciated their input.    # ADITHYA vs CKD vs ADITHYA on CKD   -Unknown baseline Cr. Cr 2.7 on admission.   -Nephrology following: Pt is getting CVVD.  -Monitor renal function.  -Please request record from PCP and hematologist.

## 2019-09-27 NOTE — H&P ADULT - PROBLEM SELECTOR PLAN 8
Hypocalcemia improved to 7.4  - additional 2g calcium gluconate  Hypokalemia improved to 3.4  - hold further repletion for now in setting of ADITHYA/CKD  - monitor on tele Hypocalcemia improved to 7.4  - additional 2g calcium gluconate  Hypokalemia improved to 3.4  - hold further repletion for now in setting of ADITHYA/CKD  Hypomagnesemia  - recheck Mg  - monitor on tele Hypocalcemia improved to 7.4  - additional 2g calcium gluconate  Hypokalemia improved to 3.4  - unclear why low in setting of possible TLS, acidosis  - hold further repletion for now in setting of ADITHYA/CKD  Hypomagnesemia  - recheck Mg  - monitor on tele Hypocalcemia improved to 7.4  - additional 2g calcium gluconate  Hypokalemia improved to 3.4  - unclear why low in setting of possible TLS, acidosis  - hold further repletion for now in setting of ADITHYA/CKD  Hypomagnesemia  - recheck Mg  - monitor on tele  -Per renal, start phoslo for now, further recs to follow in am when team rounds.

## 2019-09-27 NOTE — PROGRESS NOTE ADULT - ASSESSMENT
74F transfer from [place] PMx myeloproliferative disorder (stopped ASA and hydroxyurea last week after epistaxis) p/w bilateral acute SDH. Patient was apparently found unresponsive in her car. Labs from  notable for HgB 6.9, plt 42, . CT head shows acute SDH, 1cm on R, 3mm on L, no shift. Exam: AAOx3 with mild slowing, FC, 5/5 strength, no drift  - 4hr stability CT stable  - Repeat CT @ 24hr  - xfuse platelets PRN, goal > 100

## 2019-09-27 NOTE — ED ADULT NURSE REASSESSMENT NOTE - NS ED NURSE REASSESS COMMENT FT1
As per Md Rao patient is to be placed on BiPAP. Respiratory called. Patient a/ox3, forgetful at times. Will continue to monitor and reassess.

## 2019-09-27 NOTE — H&P ADULT - PROBLEM SELECTOR PLAN 9
sCr 2.8 on admission with unknown baseline  - BUN/Cr 35 and improvement with IVF suggestive of pre-renal component  - monitor BMP, I/O, renally dose meds, avoid nephrotoxins sCr 2.8 on admission with unknown baseline  - BUN/Cr 35 and improvement with IVF suggestive of pre-renal component  - monitor BMP, I/O, renally dose meds, avoid nephrotoxins  -labs + leila suggest TLS however hyopkalemia not compatible with TLS. c/w rasburicase and allopurinol, IVF, TLS labs q12 hrs.  -renal consult.

## 2019-09-27 NOTE — H&P ADULT - PROBLEM SELECTOR PLAN 3
Presented 95% on RA then desated to 83% temporarily placed on bipap now sating 95% on 4L NC  - ABG on RA: 7.37/24/58/13, SaO2 86% metabolic acidosis with near appropriate respiratory compensation by Ayala formula  - POCUS showed posterior B, anterior A lines, mod MR  - CXR with R>L pulm edema  - c/w supplemental O2, wean as tolerated Presented 95% on RA then desated to 83% temporarily placed on bipap now sating 95% on 4L NC  - ABG on RA: 7.37/24/58/13, SaO2 86% metabolic acidosis with near appropriate respiratory compensation by Ayala formula  - POCUS showed posterior B, anterior A lines, mod MR  - CXR with R>L pulm edema  - c/w supplemental O2, bipap prn, wean as tolerated

## 2019-09-27 NOTE — PROGRESS NOTE ADULT - SUBJECTIVE AND OBJECTIVE BOX
Patient seen and examined at bedside.    --Anticoagulation--    T(C): 38 (09-27-19 @ 04:33), Max: 38.4 (09-27-19 @ 01:31)  HR: 109 (09-27-19 @ 04:33) (101 - 122)  BP: 119/77 (09-27-19 @ 04:33) (103/66 - 135/91)  RR: 14 (09-27-19 @ 04:33) (14 - 20)  SpO2: 93% (09-27-19 @ 04:33) (92% - 98%)  Wt(kg): --    Exam: AAOx3 with mild slowing, FC, 5/5 strength, no drift

## 2019-09-27 NOTE — PROGRESS NOTE ADULT - SUBJECTIVE AND OBJECTIVE BOX
Hematology Follow-up    INTERVAL HPI/OVERNIGHT EVENTS:  Patient S&E at bedside. s/p shiley cath, about to be started on CVVD. Pt is breathing in room air.    VITAL SIGNS:  T(F): 98.2 (19 @ 09:25)  HR: 98 (19 @ 11:00)  BP: 93/45 (19 @ 11:15)  RR: 20 (19 @ 11:00)  SpO2: 97% (19 @ 11:00)  Wt(kg): --    PHYSICAL EXAM:    Constitutional: lethargic (pt received fentanyl for the procedure)  Eyes: sclera non-icteric  Neck: supple  Respiratory: basal rales, with normal respiratory effort and no intercostal retractions  Cardiovascular: RRR, normal S1S2, no M/R/G  Gastrointestinal: soft, NTND, BS normal in all four quadrants  Extremities:  no c/c/e  Neurological: AAOx0, confused  Skin: Normal temperature      MEDICATIONS  (STANDING):  allopurinol 150 milliGRAM(s) Oral Once  calcium acetate 1334 milliGRAM(s) Oral four times a day with meals  chlorhexidine 4% Liquid 1 Application(s) Topical <User Schedule>  CRRT Treatment    <Continuous>  dextrose 50% Injectable 50 milliLiter(s) IV Push once  heparin  Injectable 5000 Unit(s) IV Push once  hydroxyurea 2000 milliGRAM(s) Oral two times a day  insulin regular  human recombinant. 5 Unit(s) IV Push once  lactated ringers Bolus 1000 milliLiter(s) IV Bolus once  lactated ringers. 1000 milliLiter(s) (100 mL/Hr) IV Continuous <Continuous>  lidocaine 2% Injectable 1 Vial(s) Local Injection once  PrismaSATE Dialysate BK 0 / 3.5 5000 milliLiter(s) (4000 mL/Hr) CRRT <Continuous>  PrismaSOL Filtration BGK 0 / 2.5 5000 milliLiter(s) (1800 mL/Hr) CRRT <Continuous>  PrismaSOL Filtration BGK 0 / 2.5 5000 milliLiter(s) (200 mL/Hr) CRRT <Continuous>  rasburicase IVPB 6 milliGRAM(s) IV Intermittent once    MEDICATIONS  (PRN):  acetaminophen   Tablet .. 650 milliGRAM(s) Oral every 6 hours PRN Temp greater or equal to 38C (100.4F), Mild Pain (1 - 3)  ondansetron Injectable 4 milliGRAM(s) IV Push every 6 hours PRN Nausea      Alcohol (Unknown)  No Known Drug Allergies  shellfish (Unknown)      LABS:                        6.1    143.0 )-----------( 72       ( 27 Sep 2019 10:51 )             20.8         142  |  97  |  91<H>  ----------------------------<  84  6.1<H>   |  12<L>  |  3.19<H>    Ca    6.6<L>      27 Sep 2019 10:51  Phos  11.1       Mg     1.8         TPro  6.7  /  Alb  3.9  /  TBili  0.3  /  DBili  x   /  AST  113<H>  /  ALT  13  /  AlkPhos  90      PT/INR - ( 26 Sep 2019 19:05 )   PT: 13.2 sec;   INR: 1.14 ratio         PTT - ( 26 Sep 2019 19:05 )  PTT:24.0 sec Haptoglobin, Serum: 92 mg/dL ( @ 02:22)  Fibrinogen Assay: 448 mg/dL ( @ 00:22)  Lactate Dehydrogenase, Serum: >2250 U/L ( @ 00:20)    Urinalysis Basic - ( 27 Sep 2019 01:46 )    Color: Yellow / Appearance: Slightly Turbid / S.018 / pH: x  Gluc: x / Ketone: Trace  / Bili: Negative / Urobili: Negative   Blood: x / Protein: 100 / Nitrite: Negative   Leuk Esterase: Moderate / RBC: 6-10 /hpf / WBC >50   Sq Epi: x / Non Sq Epi: Few / Bacteria: Many        RADIOLOGY & ADDITIONAL TESTS:  Studies reviewed.    < from: Xray Chest 1 View- PORTABLE-Urgent (19 @ 00:16) >  FINDINGS:     New airspace opacities bilaterally, right greater in the left, with   indistinctness of the hilar vasculature and Kerley B lines. No pleural   effusions or pneumothoraces.    The cardiomediastinal silhouette is unremarkable.    Degenerative changes of the spine.    IMPRESSION:    Asymmetric pulmonary edema, right greater than left.    < end of copied text >

## 2019-09-27 NOTE — CONSULT NOTE ADULT - ASSESSMENT
74F w/ PMH of Myelodysplastic syndrome on ASA (and hydroxyurea per daughter who is physician at [facility]), was noted to have LOC episode in car, later found to have bilateral SDH at [facility]. Was transferred here for further care. Neurology consulted for LOC episode while in car. Episode unwitnessed and so difficult to ascertain whether it was in fact a seizure. The post-ictal confusion is consistent though.     Impression: Unwitnessed LOC episode w/ post-ictal confusion in setting of acute SDH --- consistent w/ acute symptomatic generalized seizure. There was a question whether patient's LUE weakness was related to the SDH vs. neurological sequelae of hyperviscosity syndrome. Given the location of the subdural, it is more likely to be the cause of the patients L. hemineglect and ?LUE weakness. Would obtain vessel imaging however to r/o any vessel obstruction.     Plan:   [] Load 1g Keppra   [] Start Keppra 750mg BID   [] Routine EEG   [] CTA head and neck if able (coordinate w/ renal and obtain further collateral regarding baseline creatinine)   [] Defer SDH management to NSG 74F w/ PMH of Myelodysplastic syndrome on ASA (and hydroxyurea per daughter who is physician at [facility]), was noted to have LOC episode in car, later found to have bilateral SDH at [facility]. Was transferred here for further care. Neurology consulted for LOC episode while in car. Episode unwitnessed and so difficult to ascertain whether it was in fact a seizure. The post-ictal confusion is consistent though.     Impression: Unwitnessed LOC episode w/ post-ictal confusion in setting of acute SDH --- consistent w/ acute symptomatic generalized seizure. There was a question whether patient's LUE weakness was related to the SDH vs. neurological sequelae of hyperviscosity syndrome. Given the location of the subdural, it is more likely to be the cause of the patients L. hemineglect and ?LUE weakness. Would obtain vessel imaging however to r/o any vessel obstruction.     Plan:   [] Load 1g Keppra   [] MRI brain w/wo contrast   [] Start Keppra 750mg BID   [] Routine EEG   [] CTA head and neck if able (coordinate w/ renal and obtain further collateral regarding baseline creatinine)   [] Defer SDH management to NSG

## 2019-09-27 NOTE — H&P ADULT - NSHPLABSRESULTS_GEN_ALL_CORE
LABS: Personally reviewed imaging, labs, and ECG                          7.0    266.0 )-----------( 102      ( 27 Sep 2019 00:22 )             22.4           144  |  100  |  95<H>  ----------------------------<  103<H>  3.4<L>   |  16<L>  |  2.70<H>    Ca    7.4<L>      27 Sep 2019 00:20  Phos  6.9       Mg     1.4         TPro  7.6  /  Alb  4.4  /  TBili  0.4  /  DBili  x   /  AST  87<H>  /  ALT  13  /  AlkPhos  94         Urinalysis Basic - ( 27 Sep 2019 01:46 )    Color: Yellow / Appearance: Slightly Turbid / S.018 / pH: x  Gluc: x / Ketone: Trace  / Bili: Negative / Urobili: Negative   Blood: x / Protein: 100 / Nitrite: Negative   Leuk Esterase: Moderate / RBC: 6-10 /hpf / WBC >50   Sq Epi: x / Non Sq Epi: Few / Bacteria: Many      PT/INR - ( 26 Sep 2019 19:05 )   PT: 13.2 sec;   INR: 1.14 ratio    PTT - ( 26 Sep 2019 19:05 )  PTT:24.0 sec      CARDIAC MARKERS ( 26 Sep 2019 16:54 )  .121 ng/mL / x     / x     / x     / x          CAPILLARY BLOOD GLUCOSE  POCT Blood Glucose.: 163 mg/dL (26 Sep 2019 16:32)        RADIOLOGY & ADDITIONAL TESTS:  < from: CT Head No Cont (19 @ 21:26) >    FINDINGS: There is stable appearance of the right > left holohemispheric   subdural hematomas with minimal mass effectand minimal leftward midline   shift (0.3 cm). Small falcine subdural hematoma is unchanged. There is no   large cortical infarct. Nonspecific mild periventricular and subcortical   white matter lucencies likely represent chronic microvascular ischemic   changes. There is mild to moderate cerebral volume loss with commensurate   ventricular dilatation. There is a partially empty sella.    There is no depressed skull fracture. There is minimal mucosal thickening   of the sinuses. The tympanomastoid region is clear.      IMPRESSION:   Stable appearance of the right > left holohemispheric subdural hematomas   and falcine subdural hematoma.     < end of copied text >        < from: Xray Chest 1 View- PORTABLE-Urgent (19 @ 00:16) >  ******PRELIMINARY REPORT******        INTERPRETATION:  Asymmetric pulmonary edema, right greater than left.  ******PRELIMINARY REPORT******      < end of copied text >    ECG sinus tach 101, TWI in V1, LORENA in V2, QTc 518

## 2019-09-27 NOTE — H&P ADULT - PROBLEM SELECTOR PLAN 4
Unwitnessed LOC with subsequent confusion and waxing and waning  - unclear etiology with broad ddx including SDH, hyperviscosity, signficant metabolic derangements including acidosis, electrolyte abnormalities, sepsis, ?seizure  - CTH without acute infarct  - keppra load and keppra 750 BID per neuro  - check EEG  - monitor on tele Unwitnessed LOC with subsequent confusion and waxing and waning  - unclear etiology with broad ddx including SDH, hyperviscosity, signficant metabolic derangements including acidosis, electrolyte abnormalities, sepsis, ?seizure  - CTH without acute infarct  - keppra load and keppra 750 BID per neuro  - check EEG  - monitor on tele  - fall, seizure, aspiration precaution Unwitnessed LOC with subsequent confusion and waxing and waning  - unclear etiology with broad ddx including SDH, hyperviscosity, signficant metabolic derangements including acidosis, electrolyte abnormalities, sepsis, ?seizure  - CTH without acute infarct  - s/p keppra load  - c/w keppra 750 BID per neuro  - check EEG  - check TTE  - monitor on tele  - fall, seizure, aspiration precaution Unwitnessed LOC with subsequent confusion and waxing and waning  - unclear etiology with broad ddx including SDH, hyperviscosity, significant metabolic derangements including acidosis, electrolyte abnormalities, sepsis, ?seizure  - CTH without acute infarct  - s/p keppra load  - c/w keppra 750 BID per neuro  - check EEG  - check TTE  - monitor on tele  - fall, seizure, aspiration precaution

## 2019-09-27 NOTE — CONSULT NOTE ADULT - ASSESSMENT
74 year old female with history of MPD on HU and ASA followed by Dr. Moon at [facility] was brought to [place] ED by EMS after she had syncope and was found to have SDH for which she was transferred to [facility] for further care. At ED, her WBC was 238 with 20% blasts and hematology was consulted for possible leukemia.       # Acute leukemia   # History of MPD   Her baseline PS is 1. Per patient family, she has MPD and being followed by Dr. Dr. Moon at [facility] but details was not clear. On HU and ASA but she stopped taking it last week after she had epistaxis. She knows that she has splenomegaly. She is otherwise asymptomatic and vitals are stable with normal SpO2 on RA. Creatinie is high but no baseline and not clear ADITHYA vs CKD vs ADITHYA on KCD. Peripheral smear shows approximately 15-20% blasts without monie rods.   -Admit to floor by hospitalist (No need leukophoresis at this moment)  -Stop ASA  -Start hydroxyurea 2g BID  -Start allopurinol 150mg daily (stat now and start tomorrow morning)  -Start IVF@100cc/hr (PRN Lasix)  -Please send fibrinogen, LDH, HIV, HCV antibody, HBVc antibody, HBVs antigen  -Check CMP, P, uric acid, Mg, LDH BID for now  -Check CBC at least daily for now  -Echo or MUGA   -Hematology consultation team will continue to follow  -Will send peripheral flow cytometry (Need one green top)  -Please request record from Dr. Dr. Moon at [facility] (Inclusing previous BMBx result)    # New SDH, most likely traumatic  1cm on R and 3mm on L. no midline shift  -Plt transfusion PRN  -Neurosurgery is following    # ADITHYA vs CKD vs ADITHYA on CKD   There is no baseline. Cre 2.7 on admission.   -Monitor Cre  -Please request record from PCP and hematologist    The case was discussed with Dr. Parag Castro MD, MPH  Hematology/Oncology Fellow  Pager: [phone] 74 year old female with history of MPD on HU and ASA followed by Dr. Moon at [facility] was brought to [place] ED by EMS after she had syncope and was found to have SDH for which she was transferred to [facility] for further care. At ED, her WBC was 238 with 20% blasts and hematology was consulted for possible leukemia.       # Rule out acute leukemia   # History of MPD   Her baseline PS is 1. Per patient family, she has MPD and being followed by Dr. Dr. Moon at [facility] but details was not clear. On HU and ASA but she stopped taking it last week after she had epistaxis. She knows that she has splenomegaly. When I examined, she looks comfortable and otherwise asymptomatic. slightly tachycardiac and tachypnic with normal SpO2 on RA. Creatinine is high but no baseline and not clear ADITHYA vs CKD vs ADITHYA on KCD. Peripheral smear shows approximately 15-20% blasts without monie rods. No increase of eosinophil. Acute is more likely than CML crisis.    -Admit to floor by hospitalist (No need leukophoresis at this moment)  -Stop ASA  -Start hydroxyurea 2g BID  -Start allopurinol 150mg daily (stat now and start tomorrow morning)  -Start IVF@100cc/hr (PRN Lasix)  -Please send fibrinogen, LDH, HIV, HCV antibody, HBVc antibody, HBVs antigen  -Check CMP, P, uric acid, Mg, LDH BID for now  -Check CBC at least daily for now  -Echo or MUGA   -Hematology consultation team will continue to follow  -Will send peripheral flow cytometry (Need one green top)  -Please request record from Dr. Dr. Moon at [facility] (Inclusing previous BMBx result)    # New SDH, most likely traumatic  1cm on R and 3mm on L. no midline shift  -Plt transfusion PRN  -Neurosurgery is following    # ADITHYA vs CKD vs ADITHYA on CKD   There is no baseline. Cre 2.7 on admission.   -Monitor Cre  -Please request record from PCP and hematologist    The case was discussed with Dr. Parag Castro MD, MPH  Hematology/Oncology Fellow  Pager: [phone]

## 2019-09-27 NOTE — PROGRESS NOTE ADULT - ATTENDING COMMENTS
The patient remains critically ill and unstable with confusion and easy bruising and bleeding. She has subdural hematomas. Elevated uric acid . Agree with use of rasburicase and close monitoring of uric acid level. Significant elevation of electrolyte abnormalities and patient will begin dialysis today. Bone marrow aspiration and biopsy to be performed because of elevation of the white cell count  Discussion with daughter about history of myeloproliferation. Hematologist office has been called. She is a new patient to our system. Will ask blood bank to consult on the patient for consideration of leucocyte reduction. She has a sveta cell count grater than 200 000 with predominate neutrophils seen. Flow cytometry 5 % continue hydroxyurea.  Patient has a do not resuscitate status.  Coordination of care held with ICU staff

## 2019-09-27 NOTE — ED ADULT NURSE REASSESSMENT NOTE - NS ED NURSE REASSESS COMMENT FT1
Aj catheter placed with 2RNs at the bedside obtaining sterile technique. Patient a/ox3, VSS and in NAD at this time. Will continue to monitor and reassess.

## 2019-09-27 NOTE — CONSULT NOTE ADULT - ASSESSMENT
73yo F with hx MPD on HU and ASA (followed by Dr. Edwina Moon at [facility]) transferred from OSH for b/l SDH after found unconscious and  with 20% blasts, PLT 40, scr of 2.7. found to have blast crisis with TLS. Nephrology consulted for ADITHYA.

## 2019-09-28 NOTE — PROGRESS NOTE ADULT - PROBLEM SELECTOR PLAN 3
In the setting of Lactic acidosis, on abx, r/o infectious process.  To be started on CRRT. Would avoid bicarb drip, given high risk of calcium phosphate deposition worsening tubular injury.

## 2019-09-28 NOTE — PROGRESS NOTE ADULT - ASSESSMENT
74F transfer from [facility] myeloproliferative disorder (stopped ASA and hydroxyurea last week after epistaxis) p/w bilateral acute SDH. Patient was apparently found unresponsive in her car. Labs from [facility] notable for HgB 6.9, plt 42, . CT head shows acute SDH, 1cm on R, 3mm on L, no shift.  - CTs stable, 4 hour and 24 hour   - xfuse platelets PRN, goal > 100, last one 77  -patient is DNR DNI and does not want any surgical intervention  -No neurosurgical intervention

## 2019-09-28 NOTE — PROGRESS NOTE ADULT - SUBJECTIVE AND OBJECTIVE BOX
CHIEF COMPLAINT:  SDH    Interval Events:    ON, patient had seizure- was loaded with 1 g keppra and received ativan x1, now on keppra 750 bid.  Had repeat CTH which was stable.  Patient also agitated ON, received ativan x1.  Started on azithromycin for PNA concern based on CT chest (done at same time as CTH).  patient started on D5 1/2 NS for hypoglycemia.  Patient thrombocytopenic and anemic ON, given plateletes, PRBCs and DDAVP.  Patient then developed pulmonary edema --> given lasix and BiPAP without much relief.  Patient's also hypotensive ON, started on levo, which was then d/c.  CVVHD resumed ON for acidosis.  Patient remains lethargic.    REVIEW OF SYSTEMS:    [ ] Unable to assess ROS because patient's mental status    OBJECTIVE:  ICU Vital Signs Last 24 Hrs  T(C): 36.7 (28 Sep 2019 07:00), Max: 37.1 (27 Sep 2019 08:13)  T(F): 98.1 (28 Sep 2019 07:00), Max: 98.7 (27 Sep 2019 08:13)  HR: 108 (28 Sep 2019 07:30) (84 - 120)  BP: 106/56 (28 Sep 2019 07:30) (70/28 - 194/74)  BP(mean): 79 (28 Sep 2019 07:30) (40 - 109)  ABP: --  ABP(mean): --  RR: 19 (28 Sep 2019 07:30) (13 - 45)  SpO2: 100% (28 Sep 2019 07:30) (76% - 100%)         @ 07:01  -   @ 07:00  --------------------------------------------------------  IN: 4881.9 mL / OUT: 818 mL / NET: 4063.9 mL      CAPILLARY BLOOD GLUCOSE      POCT Blood Glucose.: 99 mg/dL (27 Sep 2019 18:41)      PHYSICAL EXAM:  General: lethartic  HEENT: NC/AT  Respiratory: b/l rales  Cardiovascular: RRR no murmurs rubs or gallops  Abdomen: soft nontender nondistended; bowel sounds all 4 quadrants  Extremities: no peripheral edema    LINES:    HOSPITAL MEDICATIONS:  MEDICATIONS  (STANDING):  ALBUTerol/ipratropium for Nebulization 3 milliLiter(s) Nebulizer every 6 hours  azithromycin  IVPB 500 milliGRAM(s) IV Intermittent every 24 hours  azithromycin  IVPB      chlorhexidine 4% Liquid 1 Application(s) Topical <User Schedule>  CRRT Treatment    <Continuous>  hydroxyurea 2000 milliGRAM(s) Oral two times a day  levETIRAcetam  IVPB 750 milliGRAM(s) IV Intermittent every 12 hours  meropenem  IVPB 1000 milliGRAM(s) IV Intermittent every 8 hours  norepinephrine Infusion 0.05 MICROgram(s)/kG/Min (4.894 mL/Hr) IV Continuous <Continuous>  norepinephrine Infusion 0.05 MICROgram(s)/kG/Min (4.894 mL/Hr) IV Continuous <Continuous>  PrismaSATE Dialysate BK 0 / 3.5 5000 milliLiter(s) (4000 mL/Hr) CRRT <Continuous>  PrismaSOL Filtration BGK 0 / 2.5 5000 milliLiter(s) (1800 mL/Hr) CRRT <Continuous>  PrismaSOL Filtration BGK 0 / 2.5 5000 milliLiter(s) (200 mL/Hr) CRRT <Continuous>  vancomycin  IVPB 750 milliGRAM(s) IV Intermittent daily    MEDICATIONS  (PRN):  ondansetron Injectable 4 milliGRAM(s) IV Push every 6 hours PRN Nausea      LABS:                        6.5    163.0 )-----------( 62       ( 28 Sep 2019 01:28 )             22.2         137  |  95<L>  |  46<H>  ----------------------------<  78  3.9   |  11<L>  |  1.61<H>    Ca    8.0<L>      28 Sep 2019 01:28  Phos  7.3       Mg     2.0         TPro  6.3  /  Alb  3.6  /  TBili  0.6  /  DBili  x   /  AST  186<H>  /  ALT  21  /  AlkPhos  123<H>      PT/INR - ( 28 Sep 2019 01:28 )   PT: 24.2 sec;   INR: 2.08 ratio         PTT - ( 28 Sep 2019 01:28 )  PTT:32.0 sec  Urinalysis Basic - ( 27 Sep 2019 01:46 )    Color: Yellow / Appearance: Slightly Turbid / S.018 / pH: x  Gluc: x / Ketone: Trace  / Bili: Negative / Urobili: Negative   Blood: x / Protein: 100 / Nitrite: Negative   Leuk Esterase: Moderate / RBC: 6-10 /hpf / WBC >50   Sq Epi: x / Non Sq Epi: Few / Bacteria: Many      Arterial Blood Gas:   @ 19:38  7.21/30/144/11/98/-15.1  ABG lactate: --  Arterial Blood Gas:   @ 19:08  7.13/37/44/12/61/-15.6  ABG lactate: --  Arterial Blood Gas:   @ 16:36  7.18/37/98/13/96/-13.9  ABG lactate: --  Arterial Blood Gas:   @ 04:55  7.37/24/58/13/86/-10.7  ABG lactate: --    Venous Blood Gas:   @ 07:19  7.18/28/50/10/76  VBG Lactate: 11.9  Venous Blood Gas:   @ 02:08  7.17/34/42/12/60  VBG Lactate: 12.2  Venous Blood Gas:   @ 01:25  7.22/35/37/14/52  VBG Lactate: 11.3  Venous Blood Gas:   @ 10:48  7.28/29/38/13/54  VBG Lactate: 4.9      MICROBIOLOGY:     RADIOLOGY:  [ ] Reviewed and interpreted by me    EKG: CHIEF COMPLAINT:  SDH    Interval Events:    ON, patient had seizure- was loaded with 1 g keppra and received ativan x1, now on keppra 750 bid.  Had repeat CTH which was stable.  Patient also agitated ON, received ativan x1.  Started on azithromycin for PNA concern based on CT chest (done at same time as CTH).  patient started on D5 1/2 NS for hypoglycemia.  Patient thrombocytopenic and anemic ON, given plateletes, PRBCs and DDAVP.  Patient then developed pulmonary edema --> given lasix and BiPAP without much relief.  Patient's also hypotensive ON, started on levo, which was then d/c.  CVVHD resumed ON for acidosis.  Patient remains lethargic.    REVIEW OF SYSTEMS:    [ ] Unable to assess ROS because patient's mental status    OBJECTIVE:  ICU Vital Signs Last 24 Hrs  T(C): 36.7 (28 Sep 2019 07:00), Max: 37.1 (27 Sep 2019 08:13)  T(F): 98.1 (28 Sep 2019 07:00), Max: 98.7 (27 Sep 2019 08:13)  HR: 108 (28 Sep 2019 07:30) (84 - 120)  BP: 106/56 (28 Sep 2019 07:30) (70/28 - 194/74)  BP(mean): 79 (28 Sep 2019 07:30) (40 - 109)  ABP: --  ABP(mean): --  RR: 19 (28 Sep 2019 07:30) (13 - 45)  SpO2: 100% (28 Sep 2019 07:30) (76% - 100%)         @ 07:01  -   @ 07:00  --------------------------------------------------------  IN: 4881.9 mL / OUT: 818 mL / NET: 4063.9 mL      CAPILLARY BLOOD GLUCOSE      POCT Blood Glucose.: 99 mg/dL (27 Sep 2019 18:41)      PHYSICAL EXAM:  General: lethartic  HEENT: NC/AT  Respiratory: b/l rales  Cardiovascular: RRR no murmurs rubs or gallops  Abdomen: soft nontender nondistended; bowel sounds all 4 quadrants  Extremities: no peripheral edema  skin: scattered echymoses    LINES:    HOSPITAL MEDICATIONS:  MEDICATIONS  (STANDING):  ALBUTerol/ipratropium for Nebulization 3 milliLiter(s) Nebulizer every 6 hours  azithromycin  IVPB 500 milliGRAM(s) IV Intermittent every 24 hours  azithromycin  IVPB      chlorhexidine 4% Liquid 1 Application(s) Topical <User Schedule>  CRRT Treatment    <Continuous>  hydroxyurea 2000 milliGRAM(s) Oral two times a day  levETIRAcetam  IVPB 750 milliGRAM(s) IV Intermittent every 12 hours  meropenem  IVPB 1000 milliGRAM(s) IV Intermittent every 8 hours  norepinephrine Infusion 0.05 MICROgram(s)/kG/Min (4.894 mL/Hr) IV Continuous <Continuous>  norepinephrine Infusion 0.05 MICROgram(s)/kG/Min (4.894 mL/Hr) IV Continuous <Continuous>  PrismaSATE Dialysate BK 0 / 3.5 5000 milliLiter(s) (4000 mL/Hr) CRRT <Continuous>  PrismaSOL Filtration BGK 0 / 2.5 5000 milliLiter(s) (1800 mL/Hr) CRRT <Continuous>  PrismaSOL Filtration BGK 0 / 2.5 5000 milliLiter(s) (200 mL/Hr) CRRT <Continuous>  vancomycin  IVPB 750 milliGRAM(s) IV Intermittent daily    MEDICATIONS  (PRN):  ondansetron Injectable 4 milliGRAM(s) IV Push every 6 hours PRN Nausea      LABS:                        6.5    163.0 )-----------( 62       ( 28 Sep 2019 01:28 )             22.2         137  |  95<L>  |  46<H>  ----------------------------<  78  3.9   |  11<L>  |  1.61<H>    Ca    8.0<L>      28 Sep 2019 01:28  Phos  7.3       Mg     2.0         TPro  6.3  /  Alb  3.6  /  TBili  0.6  /  DBili  x   /  AST  186<H>  /  ALT  21  /  AlkPhos  123<H>      PT/INR - ( 28 Sep 2019 01:28 )   PT: 24.2 sec;   INR: 2.08 ratio         PTT - ( 28 Sep 2019 01:28 )  PTT:32.0 sec  Urinalysis Basic - ( 27 Sep 2019 01:46 )    Color: Yellow / Appearance: Slightly Turbid / S.018 / pH: x  Gluc: x / Ketone: Trace  / Bili: Negative / Urobili: Negative   Blood: x / Protein: 100 / Nitrite: Negative   Leuk Esterase: Moderate / RBC: 6-10 /hpf / WBC >50   Sq Epi: x / Non Sq Epi: Few / Bacteria: Many      Arterial Blood Gas:   @ 19:38  7.21/30/144/11/98/-15.1  ABG lactate: --  Arterial Blood Gas:   @ 19:08  7.13/37/44/12/61/-15.6  ABG lactate: --  Arterial Blood Gas:   @ 16:36  7.18/37/98/13/96/-13.9  ABG lactate: --  Arterial Blood Gas:   @ 04:55  7.37/24/58/13/86/-10.7  ABG lactate: --    Venous Blood Gas:   @ 07:19  7.18/28/50/10/76  VBG Lactate: 11.9  Venous Blood Gas:   @ 02:08  7.17/34/42/12/60  VBG Lactate: 12.2  Venous Blood Gas:   @ 01:25  7.22/35/37/14/52  VBG Lactate: 11.3  Venous Blood Gas:   @ 10:48  7.28/29/38/13/54  VBG Lactate: 4.9      MICROBIOLOGY:     RADIOLOGY:  [ ] Reviewed and interpreted by me    EKG:

## 2019-09-28 NOTE — PROCEDURE NOTE - NSINFORMCONSENT_GEN_A_CORE
This was an emergent procedure. over phone by daughter/Benefits, risks, and possible complications of procedure explained to patient/caregiver who verbalized understanding and gave verbal consent.

## 2019-09-28 NOTE — PROGRESS NOTE ADULT - PROBLEM SELECTOR PLAN 2
In the setting of TLS.   Started on CRRT.  CRRT stopped overnight and Phos elevated to 12, restarted CRRT this AM and patient tolerating better.

## 2019-09-28 NOTE — PROCEDURE NOTE - NSPOSTPRCRAD_GEN_A_CORE
central line located in the/R femoral vein
central line located in the superior vena cava/no pneumothorax

## 2019-09-28 NOTE — PROGRESS NOTE ADULT - ATTENDING COMMENTS
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 remains acutely ill and she requires MICU care for extremely complex issues as detailed below in this note. She is not stable and she is requiring hi flow oxygen through face mask. She has abnormal mental and neurologic function due to subdural hematoma. She is not a surgical candidate and she may have uncontrollable hemorrhage if operated upon.   She had been on aspirin therapy intermittently in the past several months and according to her daughter, hydroxyurea twice a week.   The White cell count has decreased with medical management. Review of the peripheral blood smear by me shows neutrophil predominance; numerous promyelocytes and monocytes are seen. The blast count is less than 10 % (review of the flow cytometry shows CD 34 5 %.  I have recommended N g tube to administer crushed hydroxyurea tables but daughter refuses this procedure. Uric acid level is being managed by use f rasburicase and CV HD; the uric acid levels and serum chemistries are acutely changing. She has required platelet and red cell transfusion. Please keep platelet count about [id number].  I discussed the possibility of palliative care of the patient with her daughters if the patient's neurological status does not improve.

## 2019-09-28 NOTE — PROGRESS NOTE ADULT - SUBJECTIVE AND OBJECTIVE BOX
INTERVAL HPI/OVERNIGHT EVENTS:  Patient S&E at bedside. Patient having CVVHD through intake and return catheters. Status post transfusionof packed cells and platlets through the evening Discussion with 2 daughters including physician daughter    VITAL SIGNS:  T(F): 96.7 (19 @ 11:00)  HR: 108 (19 @ 11:57)  BP: 151/65 (19 @ 11:15)  RR: 10 (19 @ 11:15)  SpO2: 100% (19 @ 11:57)  Wt(kg): --    PHYSICAL EXAM:    Constitutional: cachexia facies; face mask oxygen  Eyes:  sclera non-icteric pupils midline  Neck: supple, no masses, no JVD IV catheter for venous access  Respiratory: CTA b/l, good air entry b/l  Cardiovascular: RRR, no M/R/G  Gastrointestinal: soft, NTND, no masses palpable, + BS, no hepatosplenomegaly  Extremities: no c/c/e decrease muscle mass  Derm: thin skin easy brusiability and skin tear noted left calf area healing    Neurological: not alert and unable to respond to questions    Review of systems: Patient is no verbal and not able to respond      MEDICATIONS  (STANDING):  ALBUTerol/ipratropium for Nebulization 3 milliLiter(s) Nebulizer every 6 hours  azithromycin  IVPB 500 milliGRAM(s) IV Intermittent every 24 hours  azithromycin  IVPB      chlorhexidine 4% Liquid 1 Application(s) Topical <User Schedule>  CRRT Treatment    <Continuous>  dextrose 5%. 1000 milliLiter(s) (50 mL/Hr) IV Continuous <Continuous>  hydroxyurea 2000 milliGRAM(s) Oral two times a day  levETIRAcetam  IVPB 750 milliGRAM(s) IV Intermittent every 12 hours  meropenem  IVPB 1000 milliGRAM(s) IV Intermittent every 8 hours  norepinephrine Infusion 0.05 MICROgram(s)/kG/Min (4.894 mL/Hr) IV Continuous <Continuous>  norepinephrine Infusion 0.05 MICROgram(s)/kG/Min (4.894 mL/Hr) IV Continuous <Continuous>  PrismaSATE Dialysate BK 0 / 3.5 5000 milliLiter(s) (4000 mL/Hr) CRRT <Continuous>  PrismaSOL Filtration BGK 0 / 2.5 5000 milliLiter(s) (1800 mL/Hr) CRRT <Continuous>  PrismaSOL Filtration BGK 0 / 2.5 5000 milliLiter(s) (200 mL/Hr) CRRT <Continuous>  vancomycin  IVPB 750 milliGRAM(s) IV Intermittent daily    MEDICATIONS  (PRN):  ondansetron Injectable 4 milliGRAM(s) IV Push every 6 hours PRN Nausea      Allergies    Alcohol (Unknown)  No Known Drug Allergies  shellfish (Unknown)    Intolerances        LABS:                        7.7    137.0 )-----------( 77       ( 28 Sep 2019 07:39 )             24.5         137  |  97  |  54<H>  ----------------------------<  47<L>  5.4<H>   |  8<LL>  |  2.22<H>    Ca    7.6<L>      28 Sep 2019 07:39  Phos  12.0       Mg     2.1         TPro  6.0  /  Alb  3.5  /  TBili  0.6  /  DBili  x   /  AST  209<H>  /  ALT  40  /  AlkPhos  101      PT/INR - ( 28 Sep 2019 07:39 )   PT: 21.9 sec;   INR: 1.88 ratio         PTT - ( 28 Sep 2019 07:39 )  PTT:29.8 sec  Urinalysis Basic - ( 27 Sep 2019 01:46 )    Color: Yellow / Appearance: Slightly Turbid / S.018 / pH: x  Gluc: x / Ketone: Trace  / Bili: Negative / Urobili: Negative   Blood: x / Protein: 100 / Nitrite: Negative   Leuk Esterase: Moderate / RBC: 6-10 /hpf / WBC >50   Sq Epi: x / Non Sq Epi: Few / Bacteria: Many        RADIOLOGY & ADDITIONAL TESTS:  Studies reviewed.

## 2019-09-28 NOTE — PROGRESS NOTE ADULT - SUBJECTIVE AND OBJECTIVE BOX
Patient seen and examined at bedside.    --Anticoagulation--    T(C): 38 (09-27-19 @ 04:33), Max: 38.4 (09-27-19 @ 01:31)  HR: 109 (09-27-19 @ 04:33) (101 - 122)  BP: 119/77 (09-27-19 @ 04:33) (103/66 - 135/91)  RR: 14 (09-27-19 @ 04:33) (14 - 20)  SpO2: 93% (09-27-19 @ 04:33) (92% - 98%)  Wt(kg): --    Exam: Eo to voice, not Fc, RUE spon, HOA loc, LE wd. on bipap

## 2019-09-28 NOTE — PROCEDURE NOTE - NSPROCDETAILS_GEN_ALL_CORE
sterile technique, catheter placed/ultrasound guidance/lumen(s) aspirated and flushed/guidewire recovered/sterile dressing applied
lumen(s) aspirated and flushed/guidewire recovered/sterile dressing applied/sterile technique, catheter placed/ultrasound guidance

## 2019-09-28 NOTE — PROGRESS NOTE ADULT - PROBLEM SELECTOR PLAN 1
Most likely hemodynamically mediated with superimposed tubular injury from TLS.   Presented with scr of 2.8 now 2.7.  Phos elevated, Ldh>2000, uric acid elevated, Decreasing UOP. Case discussed with family, about the need of RRT to prevent further Tumor lysis burden to kidney, agreed for temporary RRT but would not like long term HD, therefore MICU consulted at bed side, decided to transfer to Start CVVHDF with high clearance.   Will keep pt even given underlying subdural hematoma.   Agree with rasburicase, monitor Uric acid, phos, and K.   Trend TLS labs every 6 hours.   HemOnc follow up, hydroxyurea to be given  avoid nephrotoxic meds, adjust meds based on CRRT.  Continue with leon to monitor UOP and renal recovery.    Obtain renal sone with arterial and venous doppler given hyperviscosity causing renal vein or artery thrombosis.

## 2019-09-28 NOTE — PROGRESS NOTE ADULT - ASSESSMENT
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    #NEURO  waxing MS at this   BL subdural hematomas  -no plan for surgical intervention  -repeat CTH stable  -seizure ON --> keppra 750 bid, s/p ativan x1    #RESP  Hypoxic respiratory failure on 4-5 L NC  No history of lung disease, but possible leukostasis from blast crisis?  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    #NEURO  waxing MS at this   BL subdural hematomas  -no plan for surgical intervention  -repeat CTH stable  -seizure ON --> keppra 750 bid, s/p ativan x1    #RESP  Hypoxic respiratory failure on 4-5 L NC  No history of lung disease, but possible leukostasis from blast crisis?  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    #NEURO  waxing MS at this   BL subdural hematomas  -no plan for surgical intervention  -repeat CTH stable  -seizure ON --> keppra 750 bid, s/p ativan x1    #RESP  Hypoxic respiratory failure on 4-5 L NC  No history of lung disease, but possible leukostasis from blast crisis?  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    #NEURO  waxing MS at this   BL subdural hematomas  -no plan for surgical intervention  -repeat CTH stable  -seizure ON --> keppra 750 bid, s/p ativan x1    #RESP  Hypoxic respiratory failure on 4-5 L NC  No history of lung disease, but possible leukostasis from blast crisis?  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    #NEURO  waxing MS at this   BL subdural hematomas  -no plan for surgical intervention  -repeat CTH stable  -seizure ON --> keppra 750 bid, s/p ativan x1  f/u EEG  f/u procalcintonin    #RESP  Hypoxic respiratory failure on 4-5 L NC  No history of lung disease, but possible leukostasis from blast crisis?  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  In TLS, on CVVHD for hyperkalemia, will give calcium as needed,   Labs q6, LR @ 200 cc/hr  s/p PRBC, platelets, and DDAVP for thrombocytopenia and anemia  monitor uric acid, consider more rasburicase if rises    Endo  FS q4 hr in setting of NPO and hypoglycemic episodes    #GOC  DNR/DNI  No NGT  Daughter is HCP

## 2019-09-28 NOTE — PROGRESS NOTE ADULT - ATTENDING COMMENTS
Agree with above.  Patient seen and examined. Chart reviewed. Case discussed with MICU team.    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

## 2019-09-28 NOTE — PROGRESS NOTE ADULT - ASSESSMENT
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 Edwina Moon at [facility]) transferred from OSH for b/l SDH after found unconscious and  with 20% blasts, PLT 40, scr of 2.7. found to have blast crisis with TLS. Nephrology consulted for ADITHYA.

## 2019-09-28 NOTE — PROCEDURE NOTE - NSPOSTCAREGUIDE_GEN_A_CORE
Care for catheter as per unit/ICU protocols/Verbal/written post procedure instructions were given to patient/caregiver/Keep the cast/splint/dressing clean and dry
Verbal/written post procedure instructions were given to patient/caregiver

## 2019-09-28 NOTE — PROGRESS NOTE ADULT - SUBJECTIVE AND OBJECTIVE BOX
[facility] Division of Kidney Diseases & Hypertension  FOLLOW UP NOTE  [phone]--------------------------------------------------------------------------------  Chief Complaint:Traumatic subdural hemorrhage with loss of consciousness      24 hour events/subjective: Overnight CRRT needed to be temporarily stopped around 130 AM due to patient becoming hypotensive despite no fluid being removed. Patient stabilized and CRRT restarted later on in the morning. Patient noted to have a rise of phosphorus to 12 off dialysis, I,Ca 0.96. Vital signs stable with patient on BiPAP however patient had some hypotensive episodes.        PAST HISTORY  --------------------------------------------------------------------------------  No significant changes to PMH, PSH, FHx, SHx, unless otherwise noted    ALLERGIES & MEDICATIONS  --------------------------------------------------------------------------------  Allergies    Alcohol (Unknown)  No Known Drug Allergies  shellfish (Unknown)    Intolerances      Standing Inpatient Medications  ALBUTerol/ipratropium for Nebulization 3 milliLiter(s) Nebulizer every 6 hours  azithromycin  IVPB 500 milliGRAM(s) IV Intermittent every 24 hours  azithromycin  IVPB      CRRT Treatment    <Continuous>  dextrose 5%. 1000 milliLiter(s) IV Continuous <Continuous>  hydroxyurea 2000 milliGRAM(s) Oral two times a day  levETIRAcetam  IVPB 750 milliGRAM(s) IV Intermittent every 12 hours  meropenem  IVPB 1000 milliGRAM(s) IV Intermittent every 8 hours  norepinephrine Infusion 0.05 MICROgram(s)/kG/Min IV Continuous <Continuous>  norepinephrine Infusion 0.05 MICROgram(s)/kG/Min IV Continuous <Continuous>  PrismaSATE Dialysate BK 0 / 3.5 5000 milliLiter(s) CRRT <Continuous>  PrismaSOL Filtration BGK 0 / 2.5 5000 milliLiter(s) CRRT <Continuous>  PrismaSOL Filtration BGK 0 / 2.5 5000 milliLiter(s) CRRT <Continuous>  vancomycin  IVPB 750 milliGRAM(s) IV Intermittent daily    PRN Inpatient Medications  ondansetron Injectable 4 milliGRAM(s) IV Push every 6 hours PRN      REVIEW OF SYSTEMS: Unable to obtain 2/2 clinical condition.      VITALS/PHYSICAL EXAM  --------------------------------------------------------------------------------  T(C): 35.9 (09-28-19 @ 12:00), Max: 36.8 (09-28-19 @ 09:00)  HR: 86 (09-28-19 @ 12:30) (84 - 127)  BP: 182/77 (09-28-19 @ 12:30) (70/28 - 194/74)  RR: 12 (09-28-19 @ 12:30) (10 - 35)  SpO2: 98% (09-28-19 @ 12:30) (76% - 100%)  Wt(kg): --  Height (cm): 160.02 (09-26-19 @ 18:38)  Weight (kg): 52.2 (09-27-19 @ 06:07)  BMI (kg/m2): 20.4 (09-27-19 @ 06:07)  BSA (m2): 1.53 (09-27-19 @ 06:07)      09-27-19 @ 07:01  -  09-28-19 @ 07:00  --------------------------------------------------------  IN: 4881.9 mL / OUT: 818 mL / NET: 4063.9 mL    09-28-19 @ 07:01  -  09-28-19 @ 12:42  --------------------------------------------------------  IN: 51 mL / OUT: 10 mL / NET: 41 mL      Physical Exam:  	Gen: NAD, somnolent, minimally responsive  	HEENT: Anicteric  	Pulm: CTA B/L  	CV: RRR, S1S2;  	Abd: +BS, soft, nondistended  	: No suprapubic tenderness              Extremities: no bilateral LE edema noted.               Neuro: Somnolent, minimally responsive  	Skin: Warm  	Vascular: No cyanosis              Access: RIJ non-tunneled catheter    LABS/STUDIES  --------------------------------------------------------------------------------              7.7    137.0 >-----------<  77       [09-28-19 @ 07:39]              24.5     137  |  97  |  54  ----------------------------<  47      [09-28-19 @ 07:39]  5.4   |  8   |  2.22        Ca     7.6     [09-28-19 @ 07:39]      Mg     2.1     [09-28-19 @ 07:39]      Phos  12.0     [09-28-19 @ 07:39]    TPro  6.0  /  Alb  3.5  /  TBili  0.6  /  DBili  x   /  AST  209  /  ALT  40  /  AlkPhos  101  [09-28-19 @ 07:39]    PT/INR: PT 21.9 , INR 1.88       [09-28-19 @ 07:39]  PTT: 29.8       [09-28-19 @ 07:39]    Uric acid 3.4      [09-28-19 @ 07:39]  Troponin .121      [09-26-19 @ 16:54]  LDH 7639      [09-28-19 @ 07:39]    Creatinine Trend:  SCr 2.22 [09-28 @ 07:39]  SCr 1.61 [09-28 @ 01:28]  SCr 1.91 [09-27 @ 19:38]  SCr 1.73 [09-27 @ 16:47]  SCr 3.19 [09-27 @ 10:51]    Urinalysis - [09-27-19 @ 01:46]      Color Yellow / Appearance Slightly Turbid / SG 1.018 / pH 6.5      Gluc Negative / Ketone Trace  / Bili Negative / Urobili Negative       Blood Moderate / Protein 100 / Leuk Est Moderate / Nitrite Negative      RBC 6-10 / WBC >50 / Hyaline 0-2 / Gran  / Sq Epi  / Non Sq Epi Few / Bacteria Many        HBsAb Nonreact      [09-27-19 @ 05:35]  HBsAg Nonreact      [09-27-19 @ 05:35]  HCV 0.18, Nonreact      [09-27-19 @ 05:35]  HIV Nonreact      [09-27-19 @ 02:50]

## 2019-09-28 NOTE — PROGRESS NOTE ADULT - ATTENDING COMMENTS
I have seen this patient with the fellow and agree with their assessment and plan. In addition,    Patient seen and examined during CRRT. No issues with vascular access.   Phos is elevated as CRRT was held overnight. Continue CRRT for now.     The rest of the recommendations as per fellow's note.    Alisson Cui MD  Attending Nephrologist  [phone]

## 2019-09-29 NOTE — SWALLOW BEDSIDE ASSESSMENT ADULT - CONSISTENCIES ADMINISTERED
PO trials deferred given lethargy with inability to achieve or maintain arousal despite maximal cueing.

## 2019-09-29 NOTE — CONSULT NOTE ADULT - ATTENDING COMMENTS
74F w/ PMH of Myelodysplastic syndrome on ASA (and hydroxyurea per daughter who is physician at [facility]), was noted to have LOC episode in car, later found to have bilateral SDH at [facility]. Was transferred here for further care. Pt brought in for episode of LOC while in the car, episode unwitnessed, concern for possible seizure.     Pt transferred today to the MICU for worsening MS and blast crisis due to MDS and need for urgent CVVHD.     - Would discontinue Keppra in the setting of acute kidney damage. unclear whether this was a seizure and even if so was likely provoked and AED would not be indicated for a one time seizure.   - Pt with residual deficits after SDH, Can obtain MRI w/o , but when stable.
Pt with worsened mental status. Remains on CVVHD due to tumor lysis syndrome, concern for transformation of MDS to leukemia. Oncology following. Also has E. coli UTI on meropenem. Stable SDH on repeat CTH.   Pt today awakens to voice, is responding with few words, following some commands, COOPER equally.  Received 2.5 of Ativan total overnight for agitation, plus Precedex. LFTs had been trending upwards, now stable but elevated.     EEG showing  occasional sharp waves in the left parieto-occipital region, and bilateral frontal lobs which are potential epileptic foci.     Impression: AMS 2/2 combination of  TLS,  seizure from SDH, and sedating medications,   - Consider switching from meropenem if there is another abx option given seizure potential.   - Continue Keppra 500mg BID  - Check ammonia level  - seizure precautions  - continue EEG another 24hrs
The patient is acutely ill presenting to the Emergency department with subdural hematoma and a white cell count of greater than [id number]; there is elevation of the uric acid and medication will be used to include Rasburicase to treat uric acid elevation. She is confused and should be admitted to MICU for observation and care. Peripheral blood flow cytometry should be performed. The patient may require dialysis and possible leukopheresis given the presence of blast on peripheral smear. Bone marrow aspiration and biopsy will be performed today.  The problems are complex. There is a history of a myeloproliferative syndrome treated with hydroxyurea by an outpatient hematologist.
MDS, blast crisis referred with ARF, tumor lysis syndrome  1.  Tumor lysis syndrome.  CVVHDF initiated with high clearance, 0K, high Ca, 0 PO4.  Orders reviewed and potential thermal losses from dialysate can be considerable and may need warming blanket.  Rasburicase, UA also removed by CVVHDF  2.  Hyperkalemia--magnitude of effect understated by levels because of 3.  Ca infusion.  EKG reviewed and no widening of QRS, minimal peak T waves.  3.  HypoCa--consequest to 1induced 4.  Infusion as needed despite CaPO4 deposition risks to prevent malignant arrhythmias  4.  HyperPO4--CVVHDF high clearance effective
73yo F with hx MPD on HU and ASA (followed by Dr. Edwina Moon at [facility]) presents from OSH after found unconscious with CVA (L-juvencio neglect and L-sided weakness), and leukocytosis  with 20% blasts, ADITHYA vs ADITHYA on CKD, developed fever and hypoxia in ED. Unclear to me what caused some detioriration in oxygentation and mild agitation, growsly not volume overloaded though focal Blines noted.  as well as MR.  Currently requiring 3-5L NC to maintain sats over 94% appears in no respiratory distress though has pain diffusely.  BAsed on repeat blood work she is becoming acidotic unclear etiology infections vs, TLS/ADITHYA  - Would obtain CT chest abdomen.  - renal eval, if not able to manage TLS with volume/diureses may still need CRRT  - follow up with heme re therapy given uric acid levles.   Reconsult with further decompensation

## 2019-09-29 NOTE — PROGRESS NOTE ADULT - PROBLEM SELECTOR PLAN 3
In the setting of Lactic acidosis, on abx, r/o infectious process.  Stable on CRRT. Would avoid bicarb drip, given high risk of calcium phosphate deposition worsening tubular injury.

## 2019-09-29 NOTE — CONSULT NOTE ADULT - NSHPATTENDINGPLANDISCUSS_GEN_ALL_CORE
MICU team, family, neurology team
MICU fellow, neurology team
discussed with admission fellow
[facility] team, [facility]

## 2019-09-29 NOTE — EEG REPORT - NS EEG TEXT BOX
CLIVE SIGALA [id number]     Study Date: 		09-29-19    ROUTINE EEG    Technical Information:			  		  Placement and Labeling of Electrodes:  The EEG was performed utilizing 20 channels referential EEG connections (coronal over temporal over parasagittal montage) using all standard 10-20 electrode placements with EKG.  Recording was at a sampling rate of 256 samples per second per channel.  Time synchronized digital video recording was done simultaneously with EEG recording.  A low light infrared camera was used for low light recording.  Aj and seizure detection algorithms were utilized.    CSA Technical Component:  Quantitative EEG analysis using a separate Compressed Spectral Array (CSA) software package was conducted in real-time and run at bedside after set up by the technician, digitally displaying the power of electrographic frequencies included in the 1-30Hz band using a graded color map.  This data was reviewed and interpreted independently, and is reported in a separate section below.    --------------------------------------------------------------------------------------------------  History:  CC/ HPI Patient is a 74y old  Female who presents with a chief complaint of syncope with b/l SDH (29 Sep 2019 13:46)    MEDICATIONS  (STANDING):  ALBUTerol/ipratropium for Nebulization 3 milliLiter(s) Nebulizer every 6 hours  calcium gluconate IVPB 2 Gram(s) IV Intermittent every 6 hours  chlorhexidine 4% Liquid 1 Application(s) Topical <User Schedule>  CRRT Treatment    <Continuous>  dextrose 5%. 1000 milliLiter(s) (50 mL/Hr) IV Continuous <Continuous>  levETIRAcetam  IVPB 750 milliGRAM(s) IV Intermittent every 12 hours  meropenem  IVPB 1000 milliGRAM(s) IV Intermittent every 8 hours  Phoxillum Filtration BK 4 / 2.5 5000 milliLiter(s) (2000 mL/Hr) CRRT <Continuous>  PrismaSOL Filtration BGK 0 / 2.5 5000 milliLiter(s) (800 mL/Hr) CRRT <Continuous>  PrismaSOL Filtration BGK 0 / 2.5 5000 milliLiter(s) (200 mL/Hr) CRRT <Continuous>    --------------------------------------------------------------------------------------------------  Study Interpretation:    [[[Abbreviation Key:  PDR=alpha rhythm/posterior dominant rhythm. A-P=anterior posterior gradient.  Amplitude: ‘very low’:<20; ‘low’:20-50; ‘medium’:; ‘high’:>200uV.  Persistence for periodic/rhythmic patterns (% of epoch) ‘rare’:<1%; ‘occasional’:1-10%; ‘frequent’:10-50%; ‘abundant’:50-90%; ‘continuous’:>90%.  Persistence for sporadic discharges: ‘rare’:<1/hr; ‘occasional’:1/min-1/hr; ‘frequent’:>1/min; ‘abundant’:>1/10 sec.  GRDA=generalized rhythmic delta activity, LRDA=lateralized rhythmic delta activity, TIRDA=temporal intermittent rhythmic delta activity, FIRDA=frontal intermittent rhythmic activity. LPD=PLED=lateralized periodic discharges, GPD=generalized periodic discharges, BiPDs=BiPLEDs=bilateral independent periodic epileptiform discharges, SIRPID=stimulus induced rhythmic, periodic, or ictal appearing discharges.  Modifiers: +F=with fast component, +S=with spike component, +R=with rhythmic component.  S-B=burst suppression pattern.  Max=maximal. N1-drowsy, N2-stage II sleep, N3-slow wave sleep.  HV=hyperventilation, PS=photic stimulation]]]    FINDINGS:  The background was continuous, spontaneously variable and reactive.  During wakefulness, the posteriorly dominant rhythm was poorly modulated.      There was more diffuse irregular theta and delta activity present.    Sleep Background:  Stage II sleep transients were not recorded.    Epileptiform Activity:   No epileptiform discharges were present.    Events:  No clinical events were recorded.  No seizures were recorded.    Activation Procedures:   -Hyperventilation was not performed.    -Photic stimulation was not performed.    Artifacts:  Intermittent myogenic and movement artifacts were noted.    ECG:  The heart rate on single channel ECG at baseline was predominantly near BPM = 60-70  -----------------------------------------------------------------------------------------------------    EEG Classification / Summary:  Abnormal EEG study  Moderate background slowing    -----------------------------------------------------------------------------------------------------    Clinical Impression:  Moderate diffuse or multifocal cerebral dysfunction, not specific as to etiology.  There were no epileptiform abnormalities recorded.      -------------------------------------------------------------------------------------------------------  Joaquin Chaudhry DO  Epilepsy Fellow, [facility]    Terence Orantes MD  Attending Physician, [facility] [place]

## 2019-09-29 NOTE — CONSULT NOTE ADULT - SUBJECTIVE AND OBJECTIVE BOX
[id number]  Patient is a 74y old  Female who presents with a chief complaint of syncope with b/l SDH (29 Sep 2019 13:46)    HPI:  74y F w/ PMH MDS transfer from [place] for b/l subdural hematomas. Patient says she doesn’t know what was happening earlier today and how she ended up in the hospital. Patient is AO to self, hospital, and September. Patient states she feels “shitty”. History obtained per chart review as patient is not cooperative with history, not responding to questions, fixated on discomfort from minimal contact from ongoing interventions (placement of leon, bedside ultrasound, bipap, blood draws, etc), no family at bedside, and unable to reach family via available emergency contact. Patient remembered going to [place] to get oatmeal then remembers being the hospital. Per EMS pt was found in her parked car not responding w visible oatmeal in her mouth by a bystander who called 911 when she would not respond to knock on window. On EMS arrival pt was awake and alert but confused. Took a while before she was able to understand how to open the door. On arrival at [place] was noted to have left-sided weakness. Patient had foaming in the mouth. Patient was confused for several hours. No tongue laceration or urinary incontinence. CT scan revealed b/l subdurals and was sent here w stable neuro exam. Patient had returned to the baseline mental status per son but on repeat encounter in ED by MICU, patient seemed to be waxing and waning. Reported that patient had epistaxis last week and stopped taking hydroxyurea and aspirin at that time. Denied fevers, chills, headache, weakness, numbness, vision change, photophobia, neck stiffness, cp, sob, abd pain, n/v/d, dysuria. ED course c/b Tm 101, hypoxemia to 83%, tachycardia to 122, tachypnea to 30s and temporarily placed on bipap. Now sating 95% on 4L. (27 Sep 2019 04:55).  Called for consult for patient being non-verbal and not following commands, at time of exam patient awake, somewhat verbal, AOx1, following some one step commands.      PAST MEDICAL & SURGICAL HISTORY:  Spleen enlarged  MDS (myelodysplastic syndrome)  No significant past surgical history    FAMILY HISTORY:  Family history of CVA: mother    Social Hx:  Nonsmoker, no drug or alcohol use    Home Medications:  aspirin:  (27 Sep 2019 07:59)  Hydrea:  (27 Sep 2019 07:59)  metoprolol:  (27 Sep 2019 07:59)    MEDICATIONS  (STANDING):  ALBUTerol/ipratropium for Nebulization 3 milliLiter(s) Nebulizer every 6 hours  calcium gluconate IVPB 2 Gram(s) IV Intermittent every 6 hours  chlorhexidine 4% Liquid 1 Application(s) Topical <User Schedule>  CRRT Treatment    <Continuous>  dextrose 5%. 1000 milliLiter(s) (50 mL/Hr) IV Continuous <Continuous>  levETIRAcetam  IVPB 750 milliGRAM(s) IV Intermittent every 12 hours  LORazepam   Injectable 0.5 milliGRAM(s) IV Push once  meropenem  IVPB 1000 milliGRAM(s) IV Intermittent every 8 hours  Phoxillum Filtration BK 4 / 2.5 5000 milliLiter(s) (2000 mL/Hr) CRRT <Continuous>  PrismaSOL Filtration BGK 0 / 2.5 5000 milliLiter(s) (800 mL/Hr) CRRT <Continuous>  PrismaSOL Filtration BGK 0 / 2.5 5000 milliLiter(s) (200 mL/Hr) CRRT <Continuous>    MEDICATIONS  (PRN):  ondansetron Injectable 4 milliGRAM(s) IV Push every 6 hours PRN Nausea    Allergies  Alcohol (Unknown)  No Known Drug Allergies  shellfish (Unknown)    Intolerances      REVIEW OF SYSTEMS    ROS: Pertinent positives in HPI, all other ROS were reviewed and are negative.      Vital Signs Last 24 Hrs  T(C): 35.9 (29 Sep 2019 18:30), Max: 37 (29 Sep 2019 13:30)  T(F): 96.6 (29 Sep 2019 18:30), Max: 98.6 (29 Sep 2019 13:30)  HR: 129 (29 Sep 2019 19:00) (76 - 129)  BP: 146/82 (29 Sep 2019 19:00) (90/47 - 155/72)  BP(mean): 105 (29 Sep 2019 19:00) (65 - 111)  RR: 26 (29 Sep 2019 19:00) (11 - 27)  SpO2: 92% (29 Sep 2019 18:30) (83% - 100%)    GENERAL EXAM:      NEUROLOGICAL EXAM:  LIMITED EXAM  MS: awake but needing repeated verbal arousal, AOx1, following some one step commands  CN: EOMI, PERRL  Motor: Moving all extremities, able to elevate all four extremities off bed for 10 seconds UE and 5 seconds LE. LT 4x.       Labs:   cbc                      6.6    25.8  )-----------( 78       ( 29 Sep 2019 17:55 )             20.9     [id number]    133<L>  |  96  |  9   ----------------------------<  119<H>  3.6   |  18<L>  |  0.61    Ca    8.3<L>      29 Sep 2019 17:55  Phos  2.9     09-29  Mg     2.5     09-29    TPro  5.9<L>  /  Alb  3.3  /  TBili  0.9  /  DBili  x   /  AST  366<H>  /  ALT  159<H>  /  AlkPhos  97  09-29    CoagsPT/INR - ( 29 Sep 2019 06:26 )   PT: 18.9 sec;   INR: 1.63 ratio         PTT - ( 29 Sep 2019 06:26 )  PTT:23.9 sec  Lipids  A1C  CardiacMarkers    LFTsLIVER FUNCTIONS - ( 29 Sep 2019 17:55 )  Alb: 3.3 g/dL / Pro: 5.9 g/dL / ALK PHOS: 97 U/L / ALT: 159 U/L / AST: 366 U/L / GGT: x           UA  CSF  Immunological Labs    Radiology:  CTH:  Redemonstration of bilateral acute subdural hematomas measuring 0.6 cm on   the right and 0.4 cm on the left, in greatest depth. Tiny bilateral   parafalcine subdural hematomas are also noted. There is minimal leftward   midline shift, unchanged. Ventricle size is unchanged. Otherwise,   age-related involutional changes and chronic microvascular ischemic type   changes are noted. The basal cisterns are patent. The calvarium is   intact. The paranasal sinuses and tympanomastoid cavities appear free of   acute disease.

## 2019-09-29 NOTE — SWALLOW BEDSIDE ASSESSMENT ADULT - SWALLOW EVAL: PATIENT/FAMILY GOALS STATEMENT
Family reported at least one year history of dysphagia marked by occasional coughing with PO intake. Pt was not previously evaluated by an SLP for swallowing.

## 2019-09-29 NOTE — PROGRESS NOTE ADULT - SUBJECTIVE AND OBJECTIVE BOX
[facility] Division of Kidney Diseases & Hypertension  FOLLOW UP NOTE  [phone]--------------------------------------------------------------------------------  Chief Complaint:Traumatic subdural hemorrhage with loss of consciousness      24 hour events/subjective: No acute events overnight. Patient continues on CRRT without issues. Noted WBC count to 33.4, uric acid < 0.5, potassium 3.5, phosphorus 2.9, Bicarbonate 22. Patient off BiPAP when seen this morning and noted patient fluid removal at 50 cc/hr.        PAST HISTORY  --------------------------------------------------------------------------------  No significant changes to PMH, PSH, FHx, SHx, unless otherwise noted    ALLERGIES & MEDICATIONS  --------------------------------------------------------------------------------  Allergies    Alcohol (Unknown)  No Known Drug Allergies  shellfish (Unknown)    Intolerances      Standing Inpatient Medications  ALBUTerol/ipratropium for Nebulization 3 milliLiter(s) Nebulizer every 6 hours  chlorhexidine 4% Liquid 1 Application(s) Topical <User Schedule>  CRRT Treatment    <Continuous>  dextrose 5%. 1000 milliLiter(s) IV Continuous <Continuous>  hydroxyurea 2000 milliGRAM(s) Oral two times a day  levETIRAcetam  IVPB 750 milliGRAM(s) IV Intermittent every 12 hours  meropenem  IVPB 1000 milliGRAM(s) IV Intermittent every 8 hours  PrismaSATE Dialysate BGK 4 / 2.5 5000 milliLiter(s) CRRT <Continuous>  PrismaSOL Filtration BGK 0 / 2.5 5000 milliLiter(s) CRRT <Continuous>  PrismaSOL Filtration BGK 0 / 2.5 5000 milliLiter(s) CRRT <Continuous>  vancomycin  IVPB 1000 milliGRAM(s) IV Intermittent every 24 hours    PRN Inpatient Medications  ondansetron Injectable 4 milliGRAM(s) IV Push every 6 hours PRN      REVIEW OF SYSTEMS: Unable to obtain 2/2 clinical condition.       VITALS/PHYSICAL EXAM  --------------------------------------------------------------------------------  T(C): 35.8 (09-29-19 @ 00:00), Max: 36.8 (09-28-19 @ 09:00)  HR: 79 (09-29-19 @ 05:27) (74 - 127)  BP: 155/72 (09-29-19 @ 04:00) (99/48 - 192/79)  RR: 24 (09-29-19 @ 04:00) (9 - 33)  SpO2: 95% (09-29-19 @ 05:27) (83% - 100%)  Wt(kg): --        09-28-19 @ 07:01  -  09-29-19 @ 07:00  --------------------------------------------------------  IN: 2491 mL / OUT: 2769 mL / NET: -278 mL      Physical Exam:  	Gen: NAD, somnolent, minimally responsive  	HEENT: Anicteric  	Pulm: CTA B/L  	CV: RRR, S1S2;  	Abd: +BS, soft, nondistended  	: No suprapubic tenderness              Extremities: no bilateral LE edema noted.               Neuro: Somnolent, minimally responsive  	Skin: Warm  	Vascular: No cyanosis              Access: RIJ non-tunneled catheter    LABS/STUDIES  --------------------------------------------------------------------------------              7.0    33.4  >-----------<  41       [09-29-19 @ 06:26]              21.0     136  |  101  |  7   ----------------------------<  111      [09-29-19 @ 06:26]  3.5   |  22  |  0.41        Ca     7.4     [09-29-19 @ 06:26]      Mg     2.4     [09-29-19 @ 06:26]      Phos  2.9     [09-29-19 @ 06:26]    TPro  5.6  /  Alb  3.1  /  TBili  0.8  /  DBili  x   /  AST  390  /  ALT  161  /  AlkPhos  94  [09-29-19 @ 06:26]    PT/INR: PT 18.9 , INR 1.63       [09-29-19 @ 06:26]  PTT: 23.9       [09-29-19 @ 06:26]    Uric acid <0.5      [09-29-19 @ 06:26]  LDH >2250      [09-29-19 @ 00:49]    Creatinine Trend:  SCr 0.41 [09-29 @ 06:26]  SCr 0.50 [09-29 @ 00:49]  SCr 0.69 [09-28 @ 18:49]  SCr 1.14 [09-28 @ 13:33]  SCr 2.22 [09-28 @ 07:39]    Urinalysis - [09-27-19 @ 01:46]      Color Yellow / Appearance Slightly Turbid / SG 1.018 / pH 6.5      Gluc Negative / Ketone Trace  / Bili Negative / Urobili Negative       Blood Moderate / Protein 100 / Leuk Est Moderate / Nitrite Negative      RBC 6-10 / WBC >50 / Hyaline 0-2 / Gran  / Sq Epi  / Non Sq Epi Few / Bacteria Many        HBsAb Nonreact      [09-27-19 @ 05:35]  HBsAg Nonreact      [09-27-19 @ 05:35]  HCV 0.18, Nonreact      [09-27-19 @ 05:35]  HIV Nonreact      [09-27-19 @ 02:50]

## 2019-09-29 NOTE — PROGRESS NOTE ADULT - ATTENDING COMMENTS
She is opening her eyes as the neurological examination is improving. She is still confused and requires high flow oxygen for her ventilatory insufficiency and her subdural hematoma. Today as yesterday family refuses NG tube placement and oral hydroxyurea cannot be given. Although IV therapy (possibly low dose cytarabine) may be considered, her WBC is decreasing as WBC may adhere to the dialysis membrane. WBC now 33.600 HGB 7 and platelets are 64464. Please transfuse one unit of platelets because of bilateral subdural hematoma; Skin tear is improving and numerous spontaneous ecchymosis are noted in the lower extremities.  She is anuric and in renal failure although use of [facility] V [facility] is keeping creatine stable.  Worsening liver enzymes may reflect hepatic congestion or injury due to myeloproliferative disease

## 2019-09-29 NOTE — CHART NOTE - NSCHARTNOTEFT_GEN_A_CORE
Checked 9/29/2019    This report was requested by: Fatuma Fleming | Reference #: [id number]    Others' Prescriptions  Patient Name:	Sue Ny	YOB: 1945  Address:	[address] [place] [address] [place] [address]	Sex:	Female  Rx Written	Rx Dispensed	Drug	Quantity	Days Supply	Prescriber Name  09/12/2019	09/20/2019	diazepam 5 mg tablet	30	30	Novoselac, [place] V  06/12/2019	06/15/2019	diazepam 5 mg tablet	30	30	Novoselac, [place] V  04/12/2019	04/29/2019	diazepam 5 mg tablet	30	30	Novoselac, [place] V  01/30/2019	02/07/2019	diazepam 5 mg tablet	30	30	Novoselac, [place] V  10/03/2018	10/08/2018	diazepam 5 mg tablet	30	30	Novoselac, [place] V

## 2019-09-29 NOTE — PROGRESS NOTE ADULT - ATTENDING COMMENTS
Agree with above.  Patient seen and examined. Chart reviewed. Case discussed with MICU team.    74 year old woman with newly diagnosed leukemia, blast crisis, tumor lysis, acute renal failure s/p fall with SDH,  in the MICU for CRRT and vasopressor support    - remains with altered mental status  - EEG  - avoid sedative medications  - neurology evaluation  - continue CRRT  - Keppra for seizure prophylaxis  - tolerating nasal cannula

## 2019-09-29 NOTE — CONSULT NOTE ADULT - ASSESSMENT
74y F w/ PMH MDS transfer from [place] for b/l subdural hematomas. Patient says she doesn’t know what was happening earlier today and how she ended up in the hospital. Patient is AO to self, hospital, and September. Patient states she feels “shitty”. History obtained per chart review as patient is not cooperative with history, not responding to questions, fixated on discomfort from minimal contact from ongoing interventions (placement of leon, bedside ultrasound, bipap, blood draws, etc), no family at bedside, and unable to reach family via available emergency contact. Patient remembered going to [employer] to get oatmeal then remembers being the hospital. Per EMS pt was found in her parked car not responding w visible oatmeal in her mouth by a bystander who called 911 when she would not respond to knock on window. On EMS arrival pt was awake and alert but confused. Took a while before she was able to understand how to open the door. On arrival at [place] was noted to have left-sided weakness. Patient had foaming in the mouth. Patient was confused for several hours. No tongue laceration or urinary incontinence. CT scan revealed b/l subdurals and was sent here w stable neuro exam. Patient had returned to the baseline mental status per son but on repeat encounter in ED by MICU, patient seemed to be waxing and waning. Reported that patient had epistaxis last week and stopped taking hydroxyurea and aspirin at that time. Denied fevers, chills, headache, weakness, numbness, vision change, photophobia, neck stiffness, cp, sob, abd pain, n/v/d, dysuria. ED course c/b Tm 101, hypoxemia to 83%, tachycardia to 122, tachypnea to 30s and temporarily placed on bipap. Now sating 95% on 4L. (27 Sep 2019 04:55).  Called for consult for patient being non-verbal and not following commands, at time of exam patient awake, somewhat verbal, AOx1, following some one step commands.    Impression:  B/L SDH, AOx1, fluctuating alertness.  Significant brain injury.   - c/w EEG  - c/w keppra pending EEG completion

## 2019-09-29 NOTE — PROGRESS NOTE ADULT - ATTENDING COMMENTS
I have seen this patient with the fellow and agree with their assessment and plan. In addition,    Patient seen and examined on dialysis.     ADITHYA - oliguric. Likely secondary to TLS. Currently on CRRT.  Continue current treatment.    The rest of the recommendations as per fellow's note.    Alisson Cui MD  Attending Nephrologist  [phone]

## 2019-09-29 NOTE — SWALLOW BEDSIDE ASSESSMENT ADULT - ADDITIONAL RECOMMENDATIONS
Maintain good oral hygiene   Service will f/u to assess swallow function pending improvement in mental status

## 2019-09-29 NOTE — PROGRESS NOTE ADULT - PROBLEM SELECTOR PLAN 1
Most likely hemodynamically mediated with superimposed tubular injury from TLS.   Presented with scr of 2.8. Downtrended on CRRT.  Phos elevated, Ldh>2000, uric acid elevated, Decreasing UOP. Case discussed with family, about the need of RRT to prevent further Tumor lysis burden to kidney, agreed for temporary RRT but would not like long term HD, therefore MICU consulted at bed side, decided to transfer to [facility] with high clearance.   would keep patient EVEN from fluid management perspective as she has subdural hematoma.  Agree with rasburicase, monitor Uric acid, phos, and K.   Trend TLS labs every 6 hours.   HemOnc follow up, hydroxyurea not given  avoid nephrotoxic meds, adjust meds based on CRRT.  Continue with leon to monitor UOP and renal recovery.    Obtain renal sone with arterial and venous doppler given hyperviscosity causing renal vein or artery thrombosis.

## 2019-09-29 NOTE — PROGRESS NOTE ADULT - ASSESSMENT
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    #NEURO  waxing MS at this   BL subdural hematomas  -no plan for surgical intervention  -repeat CTH stable  -seizure ON --> keppra 750 bid, s/p ativan x1  f/u EEG  f/u procalcintonin    #RESP  Hypoxic respiratory failure on 4-5 L NC  No history of lung disease, but possible leukostasis from blast crisis?  Pt DNR/DNI, will not do aggressive measures at this time   Increase to NRB   -pulmonary edema ON --> s/p BiPAP and lasix    #CV  BP soft, but stable at this time   s/p levo ON for hypotension  TTE: mild AR, mild-mod TR, EF WNL    #Renal   On CVVHD   F/u renal recs     #ID  Febrile with blast crisis   Given immunocompromised state, will cover broadly with vanc/gio  -Legionella negative. 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    #NEURO  waxing MS at this   BL subdural hematomas  -no plan for surgical intervention  -repeat CTH stable  -seizure ON --> keppra 750 bid, s/p ativan x1  f/u EEG  f/u procalcitonin     #RESP  Hypoxic respiratory failure on 4-5 L NC  No history of lung disease, but possible leukostasis from blast crisis?  Pt DNR/DNI, will not do aggressive measures at this time   -pulmonary edema ON --> s/p BiPAP and lasix  -Tolerating 5L NC    #CV  BP soft, but stable at this time   s/p levo for hypotension  TTE: mild AR, mild-mod TR, EF WNL  off pressors    #Renal   On CVVHD   ADITHYA: Monitor uric acid, phos, K    #ID  Febrile with blast crisis   Given immunocompromised state, will cover broadly with vanc/gio  -Legionella negative. 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    #NEURO  waxing MS at this   BL subdural hematomas  -no plan for surgical intervention  -repeat CTH stable  -seizure ON --> keppra 750 bid, s/p ativan x1  -keep Pl>50  f/u EEG  f/u procalcitonin     #RESP  Hypoxic respiratory failure on 4-5 L NC  No history of lung disease, but possible leukostasis from blast crisis?  Pt DNR/DNI, will not do aggressive measures at this time   -pulmonary edema ON --> s/p BiPAP and lasix  -Tolerating 5L NC    #CV  BP soft, but stable at this time   s/p levo for hypotension  TTE: mild AR, mild-mod TR, EF WNL  off pressors    #Renal   On CVVHD   ADITHYA: Monitor uric acid, phos, K  C/w leon to monitor UOP  Hyperphosphatemia: Monitor.     #ID  Febrile with blast crisis   Given immunocompromised state, will cover broadly with vanc/gio  -Legionella negative. 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    #NEURO  waxing MS at this   BL subdural hematomas  -no plan for surgical intervention  -repeat CTH stable  -seizure ON --> keppra 750 bid, s/p ativan x1  -keep Pl>50  f/u EEG  f/u procalcitonin     #RESP  Hypoxic respiratory failure on 4-5 L NC  No history of lung disease, but possible leukostasis from blast crisis?  Pt DNR/DNI, will not do aggressive measures at this time   -pulmonary edema ON --> s/p BiPAP and lasix  -Tolerating 5L NC    #CV  BP soft, but stable at this time   s/p levo for hypotension  TTE: mild AR, mild-mod TR, EF WNL  off pressors    #Renal   On CVVHD   ADITHYA: Monitor uric acid, phos, K  C/w leon to monitor UOP  -Hyperphosphatemia: Monitor. If continues to drop, consider switching to Phoxylum.  -Acidemia: Stable on CRRT  -Hypocalcemia: Monitor Ca, keep ionized Ca>1. Ca gluconate 2 g q6-8h.    #ID  Febrile with blast crisis   Given immunocompromised state, will cover broadly with vanc/gio  -Legionella negative. 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    #NEURO  waxing MS at this   BL subdural hematomas  -no plan for surgical intervention  -repeat CTH stable  -seizure ON --> keppra 750 bid, s/p ativan x1  -keep Pl>50  f/u EEG  f/u procalcitonin     #RESP  Hypoxic respiratory failure on 4-5 L NC  No history of lung disease, but possible leukostasis from blast crisis?  Pt DNR/DNI, will not do aggressive measures at this time   -pulmonary edema ON --> s/p BiPAP and lasix  -Tolerating 5L NC    #CV  BP soft, but stable at this time   s/p levo for hypotension  TTE: mild AR, mild-mod TR, EF WNL  off pressors    #Renal   On CVVHD   ADITHYA: Monitor uric acid, phos, K  C/w leon to monitor UOP  -Hyperphosphatemia: Monitor. If continues to drop, consider switching to Phoxylum.  -Acidemia: Stable on CRRT  -Hypocalcemia: Monitor Ca, keep ionized Ca>1. Ca gluconate 2 g q6-8h.    #ID  Febrile with blast crisis   Given immunocompromised state, will cover broadly with vanc/gio  -Legionella negative. 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  In TLS, on CVVHD for hyperkalemia, will give calcium as needed,   Labs q6, LR @ 200 cc/hr  s/p PRBC, platelets, and DDAVP for thrombocytopenia and anemia  monitor uric acid, consider more rasburicase if rises  -Pl 41 today, will transfused 1 unit. Goal Pl>50.  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    #NEURO  waxing MS at this   BL subdural hematomas  -no plan for surgical intervention  -repeat CTH stable  -seizure ON --> keppra 750 bid, s/p ativan x1  -keep Pl>50  - f/u EEG  - f/u procalcitonin   - f/u neuro    #RESP  Hypoxic respiratory failure on 4-5 L NC  No history of lung disease, but possible leukostasis from blast crisis?  Pt DNR/DNI, will not do aggressive measures at this time   -pulmonary edema ON --> s/p BiPAP and lasix  -Tolerating 5L NC    #CV  BP soft, but stable at this time   s/p levo for hypotension  TTE: mild AR, mild-mod TR, EF WNL  off pressors    #Renal   On CVVHD   ADITHYA: Monitor uric acid, phos, K  Dc'd leon  -Hyperphosphatemia: Monitor. If continues to drop, consider switching to Phoxylum.  -Acidemia: Stable on CRRT  -Hypocalcemia: Monitor Ca, keep ionized Ca>1. Ca gluconate 2 g q6-8h.    #ID  Febrile with blast crisis   Given immunocompromised state, will cover broadly with vanc/gio  -Legionella negative. 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  In TLS, on CVVHD for hyperkalemia, will give calcium as needed,   Labs q6, LR @ 200 cc/hr  s/p PRBC, platelets, and DDAVP for thrombocytopenia and anemia  monitor uric acid, consider more rasburicase if rises  -Pl 41 today, will transfuse 1 unit. Goal Pl>50.    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    #NEURO  waxing MS at this   BL subdural hematomas  -no plan for surgical intervention  -repeat CTH stable  -seizure ON --> keppra 750 bid, s/p ativan x1  -keep Pl>50  - f/u EEG  - f/u procalcitonin   - f/u neuro    #RESP  Hypoxic respiratory failure on 4-5 L NC  No history of lung disease, but possible leukostasis from blast crisis?  Pt DNR/DNI, will not do aggressive measures at this time   -pulmonary edema ON --> s/p BiPAP and lasix  -Tolerating 5L NC    #CV  BP soft, but stable at this time   s/p levo for hypotension  TTE: mild AR, mild-mod TR, EF WNL  off pressors    #Renal   On CVVHD   ADITHYA: Monitor uric acid, phos, K  Dc'd leon  -Hyperphosphatemia: Monitor. If continues to drop, consider switching to Phoxylum.  -Acidemia: Stable on CRRT  -Hypocalcemia: Monitor Ca, keep ionized Ca>1. Ca gluconate 2 g q6-8h.    #ID  Febrile with blast crisis   Given immunocompromised state, will cover broadly with vanc/gio  -Legionella negative. 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  In TLS, on CVVHD for hyperkalemia, will give calcium as needed,   Labs q6, LR @ 200 cc/hr  s/p PRBC, platelets, and DDAVP for thrombocytopenia and anemia  monitor uric acid, consider more rasburicase if rises  -Pl 41 today, will transfuse 1 unit. Goal Pl>50.    #Endo  FS q4 hr in setting of NPO and hypoglycemic episodes  D5 @50ml/h    #GOC  DNR/DNI  No NGT  Daughter is HCP

## 2019-09-29 NOTE — PROGRESS NOTE ADULT - PROBLEM SELECTOR PLAN 2
In the setting of TLS.   Started on CRRT.  Phos noted 2.9. Can replete for now otherwise if continues to drop can possible consider switching to Phoxylum however patient has TLS and will continue to lyse cells which can cause acute shifts in phosphorus.

## 2019-09-29 NOTE — SWALLOW BEDSIDE ASSESSMENT ADULT - SWALLOW EVAL: DIAGNOSIS
Pt presents with mental status that does not support PO intake at present time. Pt lethargic unable to achieve or maintain wakefulness for exam despite verbal, tactile, and noxious stimuli.

## 2019-09-29 NOTE — SWALLOW BEDSIDE ASSESSMENT ADULT - SLP GENERAL OBSERVATIONS
Pt asleep in bed, O2 via NC, CVVHD at bedside however pt presently on break. Pt lethargic, unable to achieve or maintain wakefulness for exam despite maximal verbal, tactile, and noxious stimuli. Absent verbal output, eyes closed throughout exam, one episode of moaning to verbal cueing. Cachectic appearing, weak congested cough at baseline. Daughters present at bedside. Pt asleep in bed, O2 via NC, CVVHD at bedside however pt presently on break. Pt lethargic, unable to achieve or maintain wakefulness for exam despite maximal verbal, tactile, and noxious stimuli. Absent verbal output, eyes closed throughout exam, one episode of moaning to verbal cueing. Cachectic appearing, weak congested cough at baseline. + Warming blanket. Daughters present at bedside.

## 2019-09-29 NOTE — PROGRESS NOTE ADULT - SUBJECTIVE AND OBJECTIVE BOX
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 Off pressors. Vanc dose increased from 750 to 1000, for vanc trough of 8.9.     REVIEW OF SYSTEMS:  [ ] Unable to assess ROS because ________    OBJECTIVE:  ICU Vital Signs Last 24 Hrs  T(C): 35.8 (29 Sep 2019 00:00), Max: 36.8 (28 Sep 2019 09:00)  T(F): 96.4 (29 Sep 2019 00:00), Max: 98.2 (28 Sep 2019 09:00)  HR: 79 (29 Sep 2019 05:27) (74 - 127)  BP: 155/72 (29 Sep 2019 04:00) (99/48 - 192/79)  BP(mean): 104 (29 Sep 2019 04:00) (69 - 120)  ABP: --  ABP(mean): --  RR: 24 (29 Sep 2019 04:00) (9 - 33)  SpO2: 95% (29 Sep 2019 05:27) (83% - 100%)        09-28 @ 07:01  -  09-29 @ 07:00  --------------------------------------------------------  IN: 2491 mL / OUT: 2769 mL / NET: -278 mL      CAPILLARY BLOOD GLUCOSE      POCT Blood Glucose.: 113 mg/dL (28 Sep 2019 17:03)      PHYSICAL EXAM:  General:   HEENT:   Lymph Nodes:  Neck:   Respiratory:   Cardiovascular:   Abdomen:   Extremities:   Skin:   Neurological:  Psychiatry:    LINES:    HOSPITAL MEDICATIONS:  Standing Meds:  ALBUTerol/ipratropium for Nebulization 3 milliLiter(s) Nebulizer every 6 hours  chlorhexidine 4% Liquid 1 Application(s) Topical <User Schedule>  CRRT Treatment    <Continuous>  dextrose 5%. 1000 milliLiter(s) IV Continuous <Continuous>  hydroxyurea 2000 milliGRAM(s) Oral two times a day  levETIRAcetam  IVPB 750 milliGRAM(s) IV Intermittent every 12 hours  meropenem  IVPB 1000 milliGRAM(s) IV Intermittent every 8 hours  PrismaSATE Dialysate BGK 4 / 2.5 5000 milliLiter(s) CRRT <Continuous>  PrismaSOL Filtration BGK 0 / 2.5 5000 milliLiter(s) CRRT <Continuous>  PrismaSOL Filtration BGK 0 / 2.5 5000 milliLiter(s) CRRT <Continuous>  vancomycin  IVPB 1000 milliGRAM(s) IV Intermittent every 24 hours      PRN Meds:  ondansetron Injectable 4 milliGRAM(s) IV Push every 6 hours PRN      LABS:                        7.0    33.4  )-----------( 41       ( 29 Sep 2019 06:26 )             21.0     Hgb Trend: 7.0<--, 7.4<--, 7.2<--, 7.5<--, 7.7<--  09-29    136  |  101  |  7   ----------------------------<  111<H>  3.5   |  22  |  0.41<L>    Ca    7.4<L>      29 Sep 2019 06:26  Phos  2.9     09-29  Mg     2.4     09-29    TPro  5.6<L>  /  Alb  3.1<L>  /  TBili  0.8  /  DBili  x   /  AST  390<H>  /  ALT  161<H>  /  AlkPhos  94  09-29    Creatinine Trend: 0.41<--, 0.50<--, 0.69<--, 1.14<--, 2.22<--, 1.61<--  PT/INR - ( 29 Sep 2019 06:26 )   PT: 18.9 sec;   INR: 1.63 ratio         PTT - ( 29 Sep 2019 06:26 )  PTT:23.9 sec    Arterial Blood Gas:  09-28 @ 14:02  7.38/36/172/21/99/-3.6  ABG lactate: --  Arterial Blood Gas:  09-27 @ 19:38  7.21/30/144/11/98/-15.1  ABG lactate: --  Arterial Blood Gas:  09-27 @ 19:08  7.13/37/44/12/61/-15.6  ABG lactate: --  Arterial Blood Gas:  09-27 @ 16:36  7.18/37/98/13/96/-13.9  ABG lactate: --    Venous Blood Gas:  09-29 @ 05:24  7.34/47/33/25/56  VBG Lactate: 2.7  Venous Blood Gas:  09-28 @ 18:30  7.32/50/26/25/33  VBG Lactate: 2.8  Venous Blood Gas:  09-28 @ 13:30  7.32/44/34/22/56  VBG Lactate: 3.9  Venous Blood Gas:  09-28 @ 07:19  7.18/28/50/10/76  VBG Lactate: 11.9  Venous Blood Gas:  09-28 @ 02:08  7.17/34/42/12/60  VBG Lactate: 12.2  Venous Blood Gas:  09-28 @ 01:25  7.22/35/37/14/52  VBG Lactate: 11.3  Venous Blood Gas:  09-27 @ 10:48  7.28/29/38/13/54  VBG Lactate: 4.9      MICROBIOLOGY:     Culture - Blood (collected 27 Sep 2019 04:55)  Source: .Blood  Preliminary Report (28 Sep 2019 05:01):    No growth to date.    Culture - Blood (collected 27 Sep 2019 04:55)  Source: .Blood  Preliminary Report (28 Sep 2019 05:01):    No growth to date.    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 Off pressors. Vanc dose increased from 750 to 1000, for vanc trough of 8.9.     REVIEW OF SYSTEMS:  [ ] Unable to assess ROS because ________    OBJECTIVE:  ICU Vital Signs Last 24 Hrs  T(C): 35.8 (29 Sep 2019 00:00), Max: 36.8 (28 Sep 2019 09:00)  T(F): 96.4 (29 Sep 2019 00:00), Max: 98.2 (28 Sep 2019 09:00)  HR: 79 (29 Sep 2019 05:27) (74 - 127)  BP: 155/72 (29 Sep 2019 04:00) (99/48 - 192/79)  BP(mean): 104 (29 Sep 2019 04:00) (69 - 120)  ABP: --  ABP(mean): --  RR: 24 (29 Sep 2019 04:00) (9 - 33)  SpO2: 95% (29 Sep 2019 05:27) (83% - 100%)        09-28 @ 07:01  -  09-29 @ 07:00  --------------------------------------------------------  IN: 2491 mL / OUT: 2769 mL / NET: -278 mL      CAPILLARY BLOOD GLUCOSE      POCT Blood Glucose.: 113 mg/dL (28 Sep 2019 17:03)      PHYSICAL EXAM:  General:   HEENT:   Lymph Nodes:  Neck:   Respiratory:   Cardiovascular:   Abdomen:   Extremities:   Skin:   Neurological:  Psychiatry:    LINES:    HOSPITAL MEDICATIONS:  Standing Meds:  ALBUTerol/ipratropium for Nebulization 3 milliLiter(s) Nebulizer every 6 hours  chlorhexidine 4% Liquid 1 Application(s) Topical <User Schedule>  CRRT Treatment    <Continuous>  dextrose 5%. 1000 milliLiter(s) IV Continuous <Continuous>  hydroxyurea 2000 milliGRAM(s) Oral two times a day  levETIRAcetam  IVPB 750 milliGRAM(s) IV Intermittent every 12 hours  meropenem  IVPB 1000 milliGRAM(s) IV Intermittent every 8 hours  PrismaSATE Dialysate BGK 4 / 2.5 5000 milliLiter(s) CRRT <Continuous>  PrismaSOL Filtration BGK 0 / 2.5 5000 milliLiter(s) CRRT <Continuous>  PrismaSOL Filtration BGK 0 / 2.5 5000 milliLiter(s) CRRT <Continuous>  vancomycin  IVPB 1000 milliGRAM(s) IV Intermittent every 24 hours      PRN Meds:  ondansetron Injectable 4 milliGRAM(s) IV Push every 6 hours PRN      LABS:                        7.0    33.4  )-----------( 41       ( 29 Sep 2019 06:26 )             21.0     Hgb Trend: 7.0<--, 7.4<--, 7.2<--, 7.5<--, 7.7<--  09-29    136  |  101  |  7   ----------------------------<  111<H>  3.5   |  22  |  0.41<L>    Ca    7.4<L>      29 Sep 2019 06:26  Phos  2.9     09-29  Mg     2.4     09-29    TPro  5.6<L>  /  Alb  3.1<L>  /  TBili  0.8  /  DBili  x   /  AST  390<H>  /  ALT  161<H>  /  AlkPhos  94  09-29    Creatinine Trend: 0.41<--, 0.50<--, 0.69<--, 1.14<--, 2.22<--, 1.61<--  PT/INR - ( 29 Sep 2019 06:26 )   PT: 18.9 sec;   INR: 1.63 ratio         PTT - ( 29 Sep 2019 06:26 )  PTT:23.9 sec    Arterial Blood Gas:  09-28 @ 14:02  7.38/36/172/21/99/-3.6  ABG lactate: --  Arterial Blood Gas:  09-27 @ 19:38  7.21/30/144/11/98/-15.1  ABG lactate: --  Arterial Blood Gas:  09-27 @ 19:08  7.13/37/44/12/61/-15.6  ABG lactate: --  Arterial Blood Gas:  09-27 @ 16:36  7.18/37/98/13/96/-13.9  ABG lactate: --    Venous Blood Gas:  09-29 @ 05:24  7.34/47/33/25/56  VBG Lactate: 2.7  Venous Blood Gas:  09-28 @ 18:30  7.32/50/26/25/33  VBG Lactate: 2.8  Venous Blood Gas:  09-28 @ 13:30  7.32/44/34/22/56  VBG Lactate: 3.9  Venous Blood Gas:  09-28 @ 07:19  7.18/28/50/10/76  VBG Lactate: 11.9  Venous Blood Gas:  09-28 @ 02:08  7.17/34/42/12/60  VBG Lactate: 12.2  Venous Blood Gas:  09-28 @ 01:25  7.22/35/37/14/52  VBG Lactate: 11.3  Venous Blood Gas:  09-27 @ 10:48  7.28/29/38/13/54  VBG Lactate: 4.9      MICROBIOLOGY:     Culture - Blood (collected 27 Sep 2019 04:55)  Source: .Blood  Preliminary Report (28 Sep 2019 05:01):    No growth to date.    Culture - Blood (collected 27 Sep 2019 04:55)  Source: .Blood  Preliminary Report (28 Sep 2019 05:01):    No growth to date.    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 Off pressors. Vanc dose increased from 750 to 1000, for vanc trough of 8.9.     REVIEW OF SYSTEMS:  [ ] Unable to assess ROS because AMS.     OBJECTIVE:  ICU Vital Signs Last 24 Hrs  T(C): 35.8 (29 Sep 2019 00:00), Max: 36.8 (28 Sep 2019 09:00)  T(F): 96.4 (29 Sep 2019 00:00), Max: 98.2 (28 Sep 2019 09:00)  HR: 79 (29 Sep 2019 05:27) (74 - 127)  BP: 155/72 (29 Sep 2019 04:00) (99/48 - 192/79)  BP(mean): 104 (29 Sep 2019 04:00) (69 - 120)  ABP: --  ABP(mean): --  RR: 24 (29 Sep 2019 04:00) (9 - 33)  SpO2: 95% (29 Sep 2019 05:27) (83% - 100%)        09-28 @ 07:01  -  09-29 @ 07:00  --------------------------------------------------------  IN: 2491 mL / OUT: 2769 mL / NET: -278 mL      CAPILLARY BLOOD GLUCOSE      POCT Blood Glucose.: 113 mg/dL (28 Sep 2019 17:03)      PHYSICAL EXAM:  General: lethargic  HEENT: NCAT  Respiratory: CTAB, on NC  Cardiovascular: RRR, no obvious m/r/g appreciated  Abdomen: soft, NTND, +BS  Extremities: no peripheral edema  Skin: scattered ecchymosis  Neurological: awake, A&Ox1-2 (oriented to hospital, daughters but cannot name them)    LINES: PIV, central line    HOSPITAL MEDICATIONS:  Standing Meds:  ALBUTerol/ipratropium for Nebulization 3 milliLiter(s) Nebulizer every 6 hours  chlorhexidine 4% Liquid 1 Application(s) Topical <User Schedule>  CRRT Treatment    <Continuous>  dextrose 5%. 1000 milliLiter(s) IV Continuous <Continuous>  hydroxyurea 2000 milliGRAM(s) Oral two times a day  levETIRAcetam  IVPB 750 milliGRAM(s) IV Intermittent every 12 hours  meropenem  IVPB 1000 milliGRAM(s) IV Intermittent every 8 hours  PrismaSATE Dialysate BGK 4 / 2.5 5000 milliLiter(s) CRRT <Continuous>  PrismaSOL Filtration BGK 0 / 2.5 5000 milliLiter(s) CRRT <Continuous>  PrismaSOL Filtration BGK 0 / 2.5 5000 milliLiter(s) CRRT <Continuous>  vancomycin  IVPB 1000 milliGRAM(s) IV Intermittent every 24 hours      PRN Meds:  ondansetron Injectable 4 milliGRAM(s) IV Push every 6 hours PRN      LABS:                        7.0    33.4  )-----------( 41       ( 29 Sep 2019 06:26 )             21.0     Hgb Trend: 7.0<--, 7.4<--, 7.2<--, 7.5<--, 7.7<--  09-29    136  |  101  |  7   ----------------------------<  111<H>  3.5   |  22  |  0.41<L>    Ca    7.4<L>      29 Sep 2019 06:26  Phos  2.9     09-29  Mg     2.4     09-29    TPro  5.6<L>  /  Alb  3.1<L>  /  TBili  0.8  /  DBili  x   /  AST  390<H>  /  ALT  161<H>  /  AlkPhos  94  09-29    Creatinine Trend: 0.41<--, 0.50<--, 0.69<--, 1.14<--, 2.22<--, 1.61<--  PT/INR - ( 29 Sep 2019 06:26 )   PT: 18.9 sec;   INR: 1.63 ratio         PTT - ( 29 Sep 2019 06:26 )  PTT:23.9 sec    Arterial Blood Gas:  09-28 @ 14:02  7.38/36/172/21/99/-3.6  ABG lactate: --  Arterial Blood Gas:  09-27 @ 19:38  7.21/30/144/11/98/-15.1  ABG lactate: --  Arterial Blood Gas:  09-27 @ 19:08  7.13/37/44/12/61/-15.6  ABG lactate: --  Arterial Blood Gas:  09-27 @ 16:36  7.18/37/98/13/96/-13.9  ABG lactate: --    Venous Blood Gas:  09-29 @ 05:24  7.34/47/33/25/56  VBG Lactate: 2.7  Venous Blood Gas:  09-28 @ 18:30  7.32/50/26/25/33  VBG Lactate: 2.8  Venous Blood Gas:  09-28 @ 13:30  7.32/44/34/22/56  VBG Lactate: 3.9  Venous Blood Gas:  09-28 @ 07:19  7.18/28/50/10/76  VBG Lactate: 11.9  Venous Blood Gas:  09-28 @ 02:08  7.17/34/42/12/60  VBG Lactate: 12.2  Venous Blood Gas:  09-28 @ 01:25  7.22/35/37/14/52  VBG Lactate: 11.3  Venous Blood Gas:  09-27 @ 10:48  7.28/29/38/13/54  VBG Lactate: 4.9      MICROBIOLOGY:     Culture - Blood (collected 27 Sep 2019 04:55)  Source: .Blood  Preliminary Report (28 Sep 2019 05:01):    No growth to date.    Culture - Blood (collected 27 Sep 2019 04:55)  Source: .Blood  Preliminary Report (28 Sep 2019 05:01):    No growth to date.    Culture - Urine (collected 27 Sep 2019 04:48)  Source: .Urine  Preliminary Report (28 Sep 2019 06:35):    >100,000 CFU/ml Gram Negative Rods      RADIOLOGY:  [ ] Reviewed and interpreted by me    EKG:

## 2019-09-29 NOTE — PROGRESS NOTE ADULT - ASSESSMENT
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 Edwina Moon at [facility]) transferred from OSH for b/l SDH after found unconscious and  with 20% blasts, PLT 40, scr of 2.7. found to have blast crisis with TLS. Nephrology consulted for ADITHYA.

## 2019-09-30 NOTE — DIETITIAN INITIAL EVALUATION ADULT. - PROBLEM SELECTOR PLAN 4
Unwitnessed LOC with subsequent confusion and waxing and waning  - unclear etiology with broad ddx including SDH, hyperviscosity, significant metabolic derangements including acidosis, electrolyte abnormalities, sepsis, ?seizure  - CTH without acute infarct  - s/p keppra load  - c/w keppra 750 BID per neuro  - check EEG  - check TTE  - monitor on tele  - fall, seizure, aspiration precaution

## 2019-09-30 NOTE — DIETITIAN INITIAL EVALUATION ADULT. - DIET TYPE
when diet advanced, recommend no restrictions, add chocolate Ensure 3 times daily, texture per ST recommendation

## 2019-09-30 NOTE — DIETITIAN INITIAL EVALUATION ADULT. - OTHER INFO
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 Report variable po intake PTA, state wt has been stable around 110 lb for past 10 years. Daughters stated NO TUBE FEEDS.    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 Report variable po intake PTA, state wt has been stable around 110 lb for past 10 years. Daughters stated NO TUBE FEEDS.    Education: Not indicated at this time

## 2019-09-30 NOTE — DIETITIAN INITIAL EVALUATION ADULT. - PERTINENT MEDS FT
MEDICATIONS  (STANDING):  ALBUTerol/ipratropium for Nebulization 3 milliLiter(s) Nebulizer every 6 hours  calcium gluconate IVPB 2 Gram(s) IV Intermittent every 6 hours  chlorhexidine 4% Liquid 1 Application(s) Topical <User Schedule>  CRRT Treatment    <Continuous>  dexmedetomidine Infusion 0.5 MICROgram(s)/kG/Hr (6.525 mL/Hr) IV Continuous <Continuous>  dextrose 5%. 1000 milliLiter(s) (50 mL/Hr) IV Continuous <Continuous>  levETIRAcetam  IVPB 750 milliGRAM(s) IV Intermittent every 12 hours  meropenem  IVPB 1000 milliGRAM(s) IV Intermittent every 8 hours  Phoxillum Filtration BK 4 / 2.5 5000 milliLiter(s) (2000 mL/Hr) CRRT <Continuous>  PrismaSOL Filtration BGK 0 / 2.5 5000 milliLiter(s) (200 mL/Hr) CRRT <Continuous>  PrismaSOL Filtration BGK 4 / 2.5 5000 milliLiter(s) (1200 mL/Hr) CRRT <Continuous>    MEDICATIONS  (PRN):  ondansetron Injectable 4 milliGRAM(s) IV Push every 6 hours PRN Nausea

## 2019-09-30 NOTE — DIETITIAN INITIAL EVALUATION ADULT. - PROBLEM SELECTOR PLAN 3
Presented 95% on RA then desated to 83% temporarily placed on bipap now sating 95% on 4L NC  - ABG on RA: 7.37/24/58/13, SaO2 86% metabolic acidosis with near appropriate respiratory compensation by Ayala formula  - POCUS showed posterior B, anterior A lines, mod MR  - CXR with R>L pulm edema  - c/w supplemental O2, bipap prn, wean as tolerated

## 2019-09-30 NOTE — DIETITIAN INITIAL EVALUATION ADULT. - PHYSICAL APPEARANCE
Unable to perform Nutrition Focused Physical Assessment in current setting; RD will reassess as appropriate. Family deferred at this time.

## 2019-09-30 NOTE — PROGRESS NOTE ADULT - ATTENDING COMMENTS
#oligoAnuric ADITHYA-ATN- TLS- on CRRT  #acidosis- stable; monitor LAcid  TLS- hyperphos, hypocalcemia- trend labs

## 2019-09-30 NOTE — DIETITIAN INITIAL EVALUATION ADULT. - PROBLEM SELECTOR PLAN 7
- check records from Dr. Moon at [facility] including BMBx  - hydroxyurea 2g BID  - monitor CBC daily  - f/u heme recs

## 2019-09-30 NOTE — DIETITIAN INITIAL EVALUATION ADULT. - PROBLEM SELECTOR PLAN 10
DVT ppx: SCDs, hold pharmacologic ppx in setting of SDH  NPO for now in setting of critical illness, waxing and waning mental status  S/S eval  Fall, seizure, aspiration precaution  GOC: DNR order placed in ED; confirmed entire MOLST with HCP/daughter, pt also DNI and no feeding tube, but +trial of IVF and +trial of abx.   Needs med-rec in AM, daughter at bedside/HCP unable to provide full list.

## 2019-09-30 NOTE — PROGRESS NOTE ADULT - ASSESSMENT
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    #NEURO  waxing MS at this   BL subdural hematomas  -no plan for surgical intervention  -repeat CTH stable  -seizure ON --> keppra 750 bid, s/p ativan x1  -keep Pl>50  - f/u EEG  - f/u procalcitonin   - f/u neuro    #RESP  Hypoxic respiratory failure on 4-5 L NC  No history of lung disease, but possible leukostasis from blast crisis?  Pt DNR/DNI, will not do aggressive measures at this time   -pulmonary edema ON --> s/p BiPAP and lasix  -Tolerating 5L NC daytime; BIPAP nightly 50% O2, IP 10, EP 5    #CV  BP stable  s/p levo for hypotension  TTE: mild AR, mild-mod TR, EF WNL  off pressors    #Renal   On CVVHD   ADITHYA: Monitor uric acid, phos, K  Dc'd leon  -Hyperphosphatemia: Monitor. If continues to drop, consider switching to Phoxylum.  -Acidemia: Stable on CRRT  -Hypocalcemia: Monitor Ca, keep ionized Ca>1. Ca gluconate 2 g q6-8h.    #ID  Febrile with blast crisis   Given immunocompromised state, will cover broadly with vanc/gio  -Legionella negative. Azithro dc'd  -UCx E.coli>100,000  -CT: PNA  -Vanc dc'd. 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  In TLS, on CVVHD for hyperkalemia, will give calcium as needed,   Labs q6, LR @ 200 cc/hr  s/p PRBC, platelets, and DDAVP for thrombocytopenia and anemia  monitor uric acid, consider more rasburicase if rises  -Pl 41 today, will transfuse 1 unit. Goal Pl>50.    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    #NEURO  Waxing MS. Some improvement - able to follow simple commands. But becomes agitated, pulls on lines, so has required Precedex gtt.  BL subdural hematomas  -no plan for surgical intervention  -repeat CTH stable  -seizure on 9/27 --> keppra 750 bid, s/p ativan x1  -keep Pl>50  - f/u EEG  - f/u procalcitonin   - f/u neuro    #RESP  Hypoxic respiratory failure on 4-5 L NC  No history of lung disease, but possible leukostasis from blast crisis?  Pt DNR/DNI, will not do aggressive measures at this time   -pulmonary edema ON --> s/p BiPAP and lasix  -Tolerating 5L NC daytime; BIPAP nightly 50% O2, IP 10, EP 5    #CV  BP stable  s/p levo for hypotension  TTE: mild AR, mild-mod TR, EF WNL  off pressors    #Renal:   ADITHYA: most likely hemodynamically mediated with superimposed tubular injury from TLS  -On CVVHD due to TLS  -Monitor uric acid, phos, K  Anuric  -Dc'd leon  Hyperphosphatemia: Monitor. If continues to drop, consider switching to Phoxylum.  Acidemia: Stable on CRRT  Hypocalcemia: Monitor Ca, keep ionized Ca>1. Ca gluconate 2 g q6-8h.    #ID  Febrile with blast crisis   Given immunocompromised state, covered broadly with vanc/gio  -Legionella negative. Azithro dc'd  -UCx E.coli>100,000  -CT: PNA  -Vanc dc'd. 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  In TLS, on CVVHD for hyperkalemia, will give calcium as needed,   Labs q6, LR @ 200 cc/hr  s/p PRBC, platelets, and DDAVP for thrombocytopenia and anemia  monitor uric acid, consider more rasburicase if rises  -Pl 41 today, will transfuse 1 unit. Goal Pl>50.    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    #NEURO  Waxing MS. Some improvement - able to follow simple commands. But becomes agitated, pulls on lines, so has required Precedex gtt.  BL subdural hematomas  -no plan for surgical intervention  -repeat CTH stable  -seizure on 9/27 --> keppra 750 bid, s/p ativan x1  -keep Pl>50  - f/u EEG  - f/u procalcitonin   - f/u neuro    #RESP  Hypoxic respiratory failure on 4-5 L NC  No history of lung disease, but possible leukostasis from blast crisis?  Pt DNR/DNI, will not do aggressive measures at this time   -pulmonary edema ON --> s/p BiPAP and lasix  -Tolerating 5L NC daytime; BIPAP nightly 50% O2, IP 10, EP 5    #CV  BP stable  s/p levo for hypotension  TTE: mild AR, mild-mod TR, EF WNL  off pressors    #Renal:   ADITHYA: most likely hemodynamically mediated with superimposed tubular injury from TLS  -On CVVHD due to TLS  -Monitor uric acid, phos, K  Anuric  -Dc'd leon  Hyperphosphatemia: Monitor. If continues to drop, consider switching to Phoxylum.  Acidemia: Stable on CRRT  Hypocalcemia: Monitor Ca, keep ionized Ca>1. Ca gluconate 2 g q6-8h.    #ID  Febrile with blast crisis   Given immunocompromised state, covered broadly with vanc/gio  -Legionella negative. Azithro dc'd  -UCx E.coli>100,000  -CT: PNA  -Vanc dc'd. C/w Meropenem (Day 4)    #GI  NPO at this time given worsening MS   As per daughter no NGT   D5 @ 50ml/h  Failed speech & swallow x 2 yesterday. Will attempt again today.    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 F/u BM results.  No role for leukopheresis at this time per heme given blast % x WBC not over 100  But given worsening MS and hypoxia, possible leukostasis?  In TLS, on CVVHD for hyperkalemia. Monitoring hypocalcemia and giving IV Ca gluconate q6h.  Labs q6h  s/p PRBC, platelets, and DDAVP for thrombocytopenia and anemia  monitor uric acid, consider more rasburicase if rises  -Pl 48 today, will transfuse 1 unit. Goal Pl>50.    #Endo  FS q4 hr in setting of NPO and hypoglycemic episodes  D5 @50ml/h    #GOC  DNR/DNI  No NGT  Daughter is HCP

## 2019-09-30 NOTE — PROGRESS NOTE ADULT - ASSESSMENT
The patient is still on bed side dialysis CV HD and minimal urine production is noted. WBC and platelet count continue to decrease. Low platelet count may reflect marrow fibrosis and platelet dysfunction. She has numerous ecchymosis on her lower extremities and she is randomly moving her arms and legs. She is not responding appropriately to questions by me. She has received benzodiazepine therapy.  She is having EEG performed.  Overall condition continues to evolve. She is not stable. She has not had good neurological recovery and has bilateral subdural hematoma. Please transfuse with platelets today. Family is aware of the serious situation and the [facility] [place] [facility] if hospital discharge is possible. This will be dependant on many factors including improvement of neurological, renal and hematological function (significant impairment remains on the 4th hospital day) . The patient is still on bed side dialysis CV HD and minimal urine production is noted. WBC and platelet count continue to decrease. Low platelet count may reflect marrow fibrosis and platelet dysfunction. She has numerous ecchymosis on her lower extremities and she is randomly moving her arms and legs. She is not responding appropriately to questions by me. She has received benzodiazepine therapy.  She is having EEG performed.  Overall condition continues to evolve. She is not stable. She has not had good neurological recovery and has bilateral subdural hematoma. Please transfuse with platelets today.  White cell count continues to decline off chemotherapy, If she is able to swallow tomorrow may consider hydroxyurea 500 mg PO Family is aware of the serious situation and the need for care in assitance /nursing home if hospital discharge is possible. This will be dependant on many factors including improvement of neurological, renal and hematological function (significant impairment remains on the 4th hospital day) .

## 2019-09-30 NOTE — PROGRESS NOTE ADULT - ATTENDING COMMENTS
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 found to have blast crisis with TLS and ADITHYA; pt started CRRT. During this hospitalization pt with Sz    Resp: Supplemental O2  ID: Pt with E. coli UTI; finish course of Meropenem  HEME: F/u as per Heme/Onc  FEN: Speech/Swallow evaluation  Renal: For now cont CRRT; follow BUN/Cr and UO/ F/u ar per Renal  Neuro: MS ? 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 found to have blast crisis with TLS and ADITHYA; pt started CRRT. During this hospitalization pt with Sz    Resp: Supplemental O2  ID: Pt with E. coli UTI; finish course of Meropenem  HEME: F/u as per Heme/Onc  FEN: Speech/Swallow evaluation  Renal: For now cont CRRT; follow BUN/Cr and UO/ F/u ar per Renal  Neuro: MS ? improving/ Avoid Benzos/ Cont Keppra/ F/u as per Neuro  Social: Family at bedside and updated/ Pt is DNR/DNI

## 2019-09-30 NOTE — PROGRESS NOTE ADULT - SUBJECTIVE AND OBJECTIVE BOX
[facility] DIVISION OF KIDNEY DISEASES AND HYPERTENSION -- FOLLOW UP NOTE  --------------------------------------------------------------------------------  Chief Complaint:Traumatic subdural hemorrhage with loss of consciousness      24 hour events/subjective: No acute events overnight. Patient continues on CRRT without issues, 2 units of platelets given yesterday, awake confused.           PAST HISTORY  --------------------------------------------------------------------------------  No significant changes to PMH, PSH, FHx, SHx, unless otherwise noted    ALLERGIES & MEDICATIONS  --------------------------------------------------------------------------------  Allergies    Alcohol (Unknown)  No Known Drug Allergies  shellfish (Unknown)    Intolerances      Standing Inpatient Medications  ALBUTerol/ipratropium for Nebulization 3 milliLiter(s) Nebulizer every 6 hours  calcium gluconate IVPB 2 Gram(s) IV Intermittent every 6 hours  chlorhexidine 4% Liquid 1 Application(s) Topical <User Schedule>  CRRT Treatment    <Continuous>  dexmedetomidine Infusion 0.5 MICROgram(s)/kG/Hr IV Continuous <Continuous>  dextrose 5%. 1000 milliLiter(s) IV Continuous <Continuous>  levETIRAcetam  IVPB 750 milliGRAM(s) IV Intermittent every 12 hours  meropenem  IVPB 1000 milliGRAM(s) IV Intermittent every 8 hours  Phoxillum Filtration BK 4 / 2.5 5000 milliLiter(s) CRRT <Continuous>  PrismaSOL Filtration BGK 0 / 2.5 5000 milliLiter(s) CRRT <Continuous>  PrismaSOL Filtration BGK 4 / 2.5 5000 milliLiter(s) CRRT <Continuous>    PRN Inpatient Medications  ondansetron Injectable 4 milliGRAM(s) IV Push every 6 hours PRN      REVIEW OF SYSTEMS  --------------------------------------------------------------------------------  not able to obtain.     VITALS/PHYSICAL EXAM  --------------------------------------------------------------------------------  T(C): 36 (09-30-19 @ 08:00), Max: 37 (09-29-19 @ 13:30)  HR: 92 (09-30-19 @ 08:20) (85 - 129)  BP: 101/54 (09-30-19 @ 07:00) (90/47 - 155/64)  RR: 14 (09-30-19 @ 07:00) (12 - 27)  SpO2: 98% (09-30-19 @ 08:20) (70% - 100%)  Wt(kg): --        09-29-19 @ 07:01  -  09-30-19 @ 07:00  --------------------------------------------------------  IN: 2185.6 mL / OUT: 2084 mL / NET: 101.6 mL    09-30-19 @ 07:01  -  09-30-19 @ 09:35  --------------------------------------------------------  IN: 200 mL / OUT: 136 mL / NET: 64 mL      Physical Exam:  	Gen: NAD, awake but confused.   	HEENT: Anicteric  	Pulm: CTA B/L  	CV: RRR, S1S2;  	Abd: +BS, soft, nondistended  	: No suprapubic tenderness              Extremities: no bilateral LE edema noted.               Neuro: Somnolent, minimally responsive  	Skin: Warm  	Vascular: No cyanosis              Access: RIJ non-tunneled catheter    LABS/STUDIES  --------------------------------------------------------------------------------              8.0    23.0  >-----------<  48       [09-30-19 @ 00:33]              23.5     134  |  98  |  7   ----------------------------<  79      [09-30-19 @ 05:55]  3.2   |  20  |  0.56        Ca     7.8     [09-30-19 @ 05:55]      Mg     2.4     [09-30-19 @ 05:55]      Phos  2.9     [09-30-19 @ 05:55]    TPro  5.4  /  Alb  3.0  /  TBili  0.8  /  DBili  x   /  AST  295  /  ALT  147  /  AlkPhos  82  [09-30-19 @ 05:55]    PT/INR: PT 15.3 , INR 1.33       [09-30-19 @ 00:33]  PTT: 24.4       [09-30-19 @ 00:33]    Uric acid 0.6      [09-30-19 @ 00:33]  LDH 5580      [09-30-19 @ 00:33]    Creatinine Trend:  SCr 0.56 [09-30 @ 05:55]  SCr 0.56 [09-30 @ 00:33]  SCr 0.61 [09-29 @ 17:55]  SCr 0.61 [09-29 @ 11:58]  SCr 0.41 [09-29 @ 06:26]

## 2019-09-30 NOTE — DIETITIAN INITIAL EVALUATION ADULT. - PROBLEM SELECTOR PLAN 8
Hypocalcemia improved to 7.4  - additional 2g calcium gluconate  Hypokalemia improved to 3.4  - unclear why low in setting of possible TLS, acidosis  - hold further repletion for now in setting of ADITHYA/CKD  Hypomagnesemia  - recheck Mg  - monitor on tele  -Per renal, start phoslo for now, further recs to follow in am when team rounds.

## 2019-09-30 NOTE — DIETITIAN INITIAL EVALUATION ADULT. - FACTORS AFF FOOD INTAKE
pt requiring intermittent sedation, was on BIPAP, has been NPO since adm pt requiring intermittent sedation, was on BIPAP, has been NPO since adm. ST note 9/29 noted unable to do eval 2/2 mental status

## 2019-09-30 NOTE — DIETITIAN INITIAL EVALUATION ADULT. - PROBLEM SELECTOR PLAN 9
sCr 2.8 on admission with unknown baseline  - BUN/Cr 35 and improvement with IVF suggestive of pre-renal component  - monitor BMP, I/O, renally dose meds, avoid nephrotoxins  -labs + leila suggest TLS however hyopkalemia not compatible with TLS. c/w rasburicase and allopurinol, IVF, TLS labs q12 hrs.  -renal consult.

## 2019-09-30 NOTE — PROGRESS NOTE ADULT - SUBJECTIVE AND OBJECTIVE BOX
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 Pt was pulling at lines.    REVIEW OF SYSTEMS:  [ ] Unable to assess ROS because pt lethargic; coming off precedex gtt    OBJECTIVE:  ICU Vital Signs Last 24 Hrs  T(C): 36 (30 Sep 2019 08:00), Max: 37 (29 Sep 2019 13:30)  T(F): 96.8 (30 Sep 2019 08:00), Max: 98.6 (29 Sep 2019 13:30)  HR: 85 (30 Sep 2019 07:00) (85 - 129)  BP: 101/54 (30 Sep 2019 07:00) (90/47 - 155/64)  BP(mean): 74 (30 Sep 2019 07:00) (10 - 112)  ABP: --  ABP(mean): --  RR: 14 (30 Sep 2019 07:00) (12 - 27)  SpO2: 100% (30 Sep 2019 07:00) (70% - 100%)        09-29 @ 07:01 - 09-30 @ 07:00  --------------------------------------------------------  IN: 2185.6 mL / OUT: 2084 mL / NET: 101.6 mL    09-30 @ 07:01 - 09-30 @ 08:00  --------------------------------------------------------  IN: 50 mL / OUT: 0 mL / NET: 50 mL      CAPILLARY BLOOD GLUCOSE      POCT Blood Glucose.: 113 mg/dL (28 Sep 2019 17:03)    PHYSICAL EXAM:  General: lethargic  HEENT: NCAT  Respiratory: CTAB, on BIPAP  Cardiovascular: RRR, no obvious m/r/g appreciated  Abdomen: soft, NTND, +BS  Extremities: no peripheral edema  Skin: scattered ecchymosis  Neurological: lethargic, but arousable. Coming off Precedex gtt    LINES: PIV, central lines    HOSPITAL MEDICATIONS:  Standing Meds:  ALBUTerol/ipratropium for Nebulization 3 milliLiter(s) Nebulizer every 6 hours  calcium gluconate IVPB 2 Gram(s) IV Intermittent every 6 hours  chlorhexidine 4% Liquid 1 Application(s) Topical <User Schedule>  CRRT Treatment    <Continuous>  dexmedetomidine Infusion 0.5 MICROgram(s)/kG/Hr IV Continuous <Continuous>  dextrose 5%. 1000 milliLiter(s) IV Continuous <Continuous>  levETIRAcetam  IVPB 750 milliGRAM(s) IV Intermittent every 12 hours  meropenem  IVPB 1000 milliGRAM(s) IV Intermittent every 8 hours  Phoxillum Filtration BK 4 / 2.5 5000 milliLiter(s) CRRT <Continuous>  PrismaSOL Filtration BGK 0 / 2.5 5000 milliLiter(s) CRRT <Continuous>  PrismaSOL Filtration BGK 0 / 2.5 5000 milliLiter(s) CRRT <Continuous>      PRN Meds:  ondansetron Injectable 4 milliGRAM(s) IV Push every 6 hours PRN      LABS:                        8.0    23.0  )-----------( 48       ( 30 Sep 2019 00:33 )             23.5     Hgb Trend: 8.0<--, 6.6<--, 7.0<--, 7.4<--, 7.2<--  09-30    134<L>  |  98  |  7   ----------------------------<  79  3.2<L>   |  20<L>  |  0.56    Ca    7.8<L>      30 Sep 2019 05:55  Phos  2.9     09-30  Mg     2.4     09-30    TPro  5.4<L>  /  Alb  3.0<L>  /  TBili  0.8  /  DBili  x   /  AST  295<H>  /  ALT  147<H>  /  AlkPhos  82  09-30    Creatinine Trend: 0.56<--, 0.56<--, 0.61<--, 0.61<--, 0.41<--, 0.50<--  PT/INR - ( 30 Sep 2019 00:33 )   PT: 15.3 sec;   INR: 1.33 ratio         PTT - ( 30 Sep 2019 00:33 )  PTT:24.4 sec    Arterial Blood Gas:  09-28 @ 14:02  7.38/36/172/21/99/-3.6  ABG lactate: --    Venous Blood Gas:  09-29 @ 11:53  7.41/39/30/24/51  VBG Lactate: 1.6  Venous Blood Gas:  09-29 @ 05:24  7.34/47/33/25/56  VBG Lactate: 2.7  Venous Blood Gas:  09-28 @ 18:30  7.32/50/26/25/33  VBG Lactate: 2.8  Venous Blood Gas:  09-28 @ 13:30  7.32/44/34/22/56  VBG Lactate: 3.9      MICROBIOLOGY:     RADIOLOGY:  [ ] Reviewed and interpreted by me    EKG: CHIEF COMPLAINT: syncope with BL SDH, 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 Pt was pulling at lines.    REVIEW OF SYSTEMS:  [ ] Unable to assess ROS because pt lethargic; coming off precedex gtt    OBJECTIVE:  ICU Vital Signs Last 24 Hrs  T(C): 36 (30 Sep 2019 08:00), Max: 37 (29 Sep 2019 13:30)  T(F): 96.8 (30 Sep 2019 08:00), Max: 98.6 (29 Sep 2019 13:30)  HR: 85 (30 Sep 2019 07:00) (85 - 129)  BP: 101/54 (30 Sep 2019 07:00) (90/47 - 155/64)  BP(mean): 74 (30 Sep 2019 07:00) (10 - 112)  ABP: --  ABP(mean): --  RR: 14 (30 Sep 2019 07:00) (12 - 27)  SpO2: 100% (30 Sep 2019 07:00) (70% - 100%)        09-29 @ 07:01 - 09-30 @ 07:00  --------------------------------------------------------  IN: 2185.6 mL / OUT: 2084 mL / NET: 101.6 mL    09-30 @ 07:01 - 09-30 @ 08:00  --------------------------------------------------------  IN: 50 mL / OUT: 0 mL / NET: 50 mL      CAPILLARY BLOOD GLUCOSE      POCT Blood Glucose.: 113 mg/dL (28 Sep 2019 17:03)    PHYSICAL EXAM:  General: lethargic  HEENT: NCAT  Respiratory: coarse breath sounds BL, on BIPAP  Cardiovascular: RRR, no obvious m/r/g appreciated  Abdomen: soft, NTND, +BS  Extremities: no peripheral edema  Skin: scattered ecchymosis  Neurological: lethargic, but arousable. Coming off Precedex gtt    LINES: PIV, central lines    HOSPITAL MEDICATIONS:  Standing Meds:  ALBUTerol/ipratropium for Nebulization 3 milliLiter(s) Nebulizer every 6 hours  calcium gluconate IVPB 2 Gram(s) IV Intermittent every 6 hours  chlorhexidine 4% Liquid 1 Application(s) Topical <User Schedule>  CRRT Treatment    <Continuous>  dexmedetomidine Infusion 0.5 MICROgram(s)/kG/Hr IV Continuous <Continuous>  dextrose 5%. 1000 milliLiter(s) IV Continuous <Continuous>  levETIRAcetam  IVPB 750 milliGRAM(s) IV Intermittent every 12 hours  meropenem  IVPB 1000 milliGRAM(s) IV Intermittent every 8 hours  Phoxillum Filtration BK 4 / 2.5 5000 milliLiter(s) CRRT <Continuous>  PrismaSOL Filtration BGK 0 / 2.5 5000 milliLiter(s) CRRT <Continuous>  PrismaSOL Filtration BGK 0 / 2.5 5000 milliLiter(s) CRRT <Continuous>      PRN Meds:  ondansetron Injectable 4 milliGRAM(s) IV Push every 6 hours PRN      LABS:                        8.0    23.0  )-----------( 48       ( 30 Sep 2019 00:33 )             23.5     Hgb Trend: 8.0<--, 6.6<--, 7.0<--, 7.4<--, 7.2<--  09-30    134<L>  |  98  |  7   ----------------------------<  79  3.2<L>   |  20<L>  |  0.56    Ca    7.8<L>      30 Sep 2019 05:55  Phos  2.9     09-30  Mg     2.4     09-30    TPro  5.4<L>  /  Alb  3.0<L>  /  TBili  0.8  /  DBili  x   /  AST  295<H>  /  ALT  147<H>  /  AlkPhos  82  09-30    Creatinine Trend: 0.56<--, 0.56<--, 0.61<--, 0.61<--, 0.41<--, 0.50<--  PT/INR - ( 30 Sep 2019 00:33 )   PT: 15.3 sec;   INR: 1.33 ratio         PTT - ( 30 Sep 2019 00:33 )  PTT:24.4 sec    Arterial Blood Gas:  09-28 @ 14:02  7.38/36/172/21/99/-3.6  ABG lactate: --    Venous Blood Gas:  09-29 @ 11:53  7.41/39/30/24/51  VBG Lactate: 1.6  Venous Blood Gas:  09-29 @ 05:24  7.34/47/33/25/56  VBG Lactate: 2.7  Venous Blood Gas:  09-28 @ 18:30  7.32/50/26/25/33  VBG Lactate: 2.8  Venous Blood Gas:  09-28 @ 13:30  7.32/44/34/22/56  VBG Lactate: 3.9      MICROBIOLOGY:     RADIOLOGY:  [ ] Reviewed and interpreted by me    EKG:

## 2019-09-30 NOTE — DIETITIAN INITIAL EVALUATION ADULT. - PROBLEM SELECTOR PLAN 6
WBC 260s with 20% blasts; Per heme/onc team suspecting blast crisis, will need to f/u prior bm bx.  - uric acid 18.4, phos 7.4, LDH 2250, Ca 6 (heme aware) but K low  - fibrinogen wnl  - HIV negative  - f/u hep B and C serology  - check flow cytometry  - check TTE  - check records from Dr. Moon at [facility] including BMBx  - c/w hydroxyurea 2g BID  - c/w allopurinol 150mg daily  - c/w LR@100cc/hr and lasix PRN  - monitor CBC, CMP, mg, phos, uric acid, LDH BID  - monitor CBC daily  - f/u heme recs including regarding rasburicase; per discussion with heme fellow no plan for urgent leukophoresis. rasburicase IV now ordered per heme team.  -per heme/onc team, no indication for urgent leukopheresis overnight; however, pt with noted assymetrical pulm edema on imaging and pocus confirming likely noncardiogenic pulm edema; will need to f/u ongoing heme/onc recs regarding decision for leukopheresis, if required will need upgrade to MICU.

## 2019-09-30 NOTE — DIETITIAN INITIAL EVALUATION ADULT. - PROBLEM SELECTOR PLAN 2
T101.2 rectal, HR 100s-120s, RR up to 20s-30s, leukocytosis, lactate 4 with positive UA vs aspiration given history.   - c/w zosyn q12 for now  - f/u UCx and BCx in lab  - obtain CT C/A/P to eval infectious source, non-con given renal insufficiency  - repeat lactate  -c/w ivf as part of TLS tx.

## 2019-09-30 NOTE — PROGRESS NOTE ADULT - SUBJECTIVE AND OBJECTIVE BOX
INTERVAL HPI/OVERNIGHT EVENTS:  Patient S&E at bedside. No o/n events,     VITAL SIGNS:  T(F): 96.4 (09-30-19 @ 13:00)  HR: 79 (09-30-19 @ 14:00)  BP: 107/57 (09-30-19 @ 14:00)  RR: 14 (09-30-19 @ 14:00)  SpO2: 97% (09-30-19 @ 14:00)  Wt(kg): --    PHYSICAL EXAM:    Constitutional: NAD  Eyes: EOMI, sclera non-icteric  Neck: supple, no masses, no JVD  Respiratory: CTA b/l, good air entry b/l  Cardiovascular: RRR, no M/R/G  Gastrointestinal: soft, NTND, no masses palpable, + BS, no hepatosplenomegaly  Extremities: no c/c/e  Neurological: AAOx3      MEDICATIONS  (STANDING):  ALBUTerol/ipratropium for Nebulization 3 milliLiter(s) Nebulizer every 6 hours  calcium gluconate IVPB 2 Gram(s) IV Intermittent every 6 hours  chlorhexidine 4% Liquid 1 Application(s) Topical <User Schedule>  CRRT Treatment    <Continuous>  dexmedetomidine Infusion 0.5 MICROgram(s)/kG/Hr (6.525 mL/Hr) IV Continuous <Continuous>  dextrose 5%. 1000 milliLiter(s) (50 mL/Hr) IV Continuous <Continuous>  levETIRAcetam  IVPB 750 milliGRAM(s) IV Intermittent every 12 hours  meropenem  IVPB 1000 milliGRAM(s) IV Intermittent every 8 hours  Phoxillum Filtration BK 4 / 2.5 5000 milliLiter(s) (2000 mL/Hr) CRRT <Continuous>  PrismaSOL Filtration BGK 0 / 2.5 5000 milliLiter(s) (200 mL/Hr) CRRT <Continuous>  PrismaSOL Filtration BGK 4 / 2.5 5000 milliLiter(s) (1200 mL/Hr) CRRT <Continuous>    MEDICATIONS  (PRN):  ondansetron Injectable 4 milliGRAM(s) IV Push every 6 hours PRN Nausea      Allergies    Alcohol (Unknown)  No Known Drug Allergies  shellfish (Unknown)    Intolerances        LABS:                        7.3    17.5  )-----------( 78       ( 30 Sep 2019 13:35 )             22.3     09-30    135  |  99  |  7   ----------------------------<  88  3.5   |  21<L>  |  0.55    Ca    8.4      30 Sep 2019 13:35  Phos  2.7     09-30  Mg     2.4     09-30    TPro  5.7<L>  /  Alb  3.5  /  TBili  1.2  /  DBili  x   /  AST  305<H>  /  ALT  158<H>  /  AlkPhos  90  09-30    PT/INR - ( 30 Sep 2019 00:33 )   PT: 15.3 sec;   INR: 1.33 ratio         PTT - ( 30 Sep 2019 00:33 )  PTT:24.4 sec      RADIOLOGY & ADDITIONAL TESTS:  Studies reviewed.

## 2019-09-30 NOTE — DIETITIAN INITIAL EVALUATION ADULT. - ENERGY NEEDS
Ht: 63"  Wt: 110  BMI: 19.6  kg/m2   IBW: 115 (+/-10%)     within IBW  Edema: none      Skin: no pressure injuries documented

## 2019-09-30 NOTE — DIETITIAN INITIAL EVALUATION ADULT. - PROBLEM SELECTOR PLAN 5
In setting of lactic acidosis, uremia, trace ketones  - delta delta of 2  - bicarb downtrending; per renal no need for bicarb at the moment, further recs pending.   - renal c/s, called in am ~7:55 am, pending recs.

## 2019-09-30 NOTE — PROGRESS NOTE ADULT - PROBLEM SELECTOR PLAN 1
Most likely hemodynamically mediated with superimposed tubular injury from TLS.   Presented with scr of 2.8 was at 2.7. Phos elevated, Ldh>2000, uric acid elevated, Decreasing UOP. Started on CVVHDF 09/27/19 due to TLS. Labs reviewed with add 4 k pre filter solution. Seems pt TLS improving, wbc coming down, uric acid/k/baldev stable. Anuric, will keep pt on CRRT for now given LDH still high and has SDH.   Trend TLS labs every 6 hours.   HemOnc follow up, hydroxyurea to be given  avoid nephrotoxic meds, adjust meds based on CRRT.  Serial bladder scan.     Obtain renal sono with arterial and venous doppler given hyperviscosity causing renal vein or artery thrombosis.

## 2019-09-30 NOTE — PROGRESS NOTE ADULT - ASSESSMENT
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 Edwina Moon at [facility]) transferred from OSH for b/l SDH after found unconscious and  with 20% blasts, PLT 40, scr of 2.7. found to have blast crisis with TLS. Nephrology consulted for ADITHYA.

## 2019-10-01 NOTE — PROGRESS NOTE ADULT - SUBJECTIVE AND OBJECTIVE BOX
Neurology Progress Note    Interval history - pt doing very well. No neurological deficits. AOx3.     74y F w/ PMH MDS transfer from [place] for b/l subdural hematomas. Patient says she doesn’t know what was happening earlier today and how she ended up in the hospital. Patient is AO to self, hospital, and September. Patient states she feels “shitty”. History obtained per chart review as patient is not cooperative with history, not responding to questions, fixated on discomfort from minimal contact from ongoing interventions (placement of leon, bedside ultrasound, bipap, blood draws, etc), no family at bedside, and unable to reach family via available emergency contact. Patient remembered going to [employer] to get oatmeal then remembers being the hospital. Per EMS pt was found in her parked car not responding w visible oatmeal in her mouth by a bystander who called 911 when she would not respond to knock on window. On EMS arrival pt was awake and alert but confused. Took a while before she was able to understand how to open the door. On arrival at [place] was noted to have left-sided weakness. Patient had foaming in the mouth. Patient was confused for several hours. No tongue laceration or urinary incontinence. CT scan revealed b/l subdurals and was sent here w stable neuro exam. Patient had returned to the baseline mental status per son but on repeat encounter in ED by MICU, patient seemed to be waxing and waning. Reported that patient had epistaxis last week and stopped taking hydroxyurea and aspirin at that time. Denied fevers, chills, headache, weakness, numbness, vision change, photophobia, neck stiffness, cp, sob, abd pain, n/v/d, dysuria. ED course c/b Tm 101, hypoxemia to 83%, tachycardia to 122, tachypnea to 30s and temporarily placed on bipap. Now sating 95% on 4L. (27 Sep 2019 04:55).  Called for consult for patient being non-verbal and not following commands, at time of exam patient awake, somewhat verbal, AOx1, following some one step commands.    ROS: Pertinent positives in HPI, all other ROS were reviewed and are negative.      Vital Signs Last 24 Hrs  T(C): 36.4 (01 Oct 2019 12:00), Max: 43 (30 Sep 2019 20:00)  T(F): 97.5 (01 Oct 2019 12:00), Max: 109.4 (30 Sep 2019 20:00)  HR: 144 (01 Oct 2019 12:56) (68 - 144)  BP: 153/91 (01 Oct 2019 11:00) (96/52 - 158/66)  BP(mean): 118 (01 Oct 2019 11:00) (69 - 118)  RR: 29 (01 Oct 2019 12:00) (11 - 30)  SpO2: 95% (01 Oct 2019 12:56) (92% - 100%)    GENERAL EXAM:  NEUROLOGICAL EXAM:  MS - AAOX3, speech is fluent repetition in tact, voice slurred (dentures not in place)  Cns - EOMI, face symmetric, v1-v3,   Motor - moving all four extremities  Sensory - LT in tact    Labs:                        8.7    21.69 )-----------( 80       ( 01 Oct 2019 13:17 )             25.5   10-01    132<L>  |  104  |  5<L>  ----------------------------<  85  3.8   |  17<L>  |  0.46<L>    Ca    8.2<L>      01 Oct 2019 06:02  Phos  2.2     10-01  Mg     2.2     10-01    TPro  5.2<L>  /  Alb  2.9<L>  /  TBili  1.0  /  DBili  x   /  AST  245<H>  /  ALT  147<H>  /  AlkPhos  83  10-01    Lipids  A1C  Cardiac Markers    LFTsLIVER FUNCTIONS - ( 29 Sep 2019 17:55 )  Alb: 3.3 g/dL / Pro: 5.9 g/dL / ALK PHOS: 97 U/L / ALT: 159 U/L / AST: 366 U/L / GGT: x        UA  CSF  Immunological Labs    Radiology:  CTH:  Redemonstration of bilateral acute subdural hematomas measuring 0.6 cm on   the right and 0.4 cm on the left, in greatest depth. Tiny bilateral   parafalcine subdural hematomas are also noted. There is minimal leftward   midline shift, unchanged. Ventricle size is unchanged. Otherwise,   age-related involutional changes and chronic microvascular ischemic type   changes are noted. The basal cisterns are patent. The calvarium is   intact. The paranasal sinuses and tympanomastoid cavities appear free of   acute disease.    < from: CT Head No Cont (09.27.19 @ 20:41) >  Redemonstration of bilateral acute subdural hematomas measuring 0.6 cm on   the right and 0.4 cm on the left, in greatest depth. Tiny bilateral   parafalcine subdural hematomas are also noted. There is minimal leftward   midline shift, unchanged. Ventricle size is unchanged. Otherwise,   age-related involutional changes and chronic microvascular ischemic type   changes are noted. The basal cisterns are patent. The calvarium is   intact. The paranasal sinuses and tympanomastoid cavities appear free of   acute disease.    < end of copied text >      Clinical Impression:  Potential seizure foci in the left frontal, left parieto-occipital, and right frontal regions.  Moderate diffuse or multifocal cerebral dysfunction, not specific as to etiology. Neurology Progress Note    Interval history - pt markedly improved. awake and alert, oriented times 2-3     74y F w/ PMH MDS transfer from [place] for b/l subdural hematomas. Patient says she doesn’t know what was happening earlier today and how she ended up in the hospital. Patient is AO to self, hospital, and September. Patient states she feels “shitty”. History obtained per chart review as patient is not cooperative with history, not responding to questions, fixated on discomfort from minimal contact from ongoing interventions (placement of leon, bedside ultrasound, bipap, blood draws, etc), no family at bedside, and unable to reach family via available emergency contact. Patient remembered going to [employer] to get oatmeal then remembers being the hospital. Per EMS pt was found in her parked car not responding w visible oatmeal in her mouth by a bystander who called 911 when she would not respond to knock on window. On EMS arrival pt was awake and alert but confused. Took a while before she was able to understand how to open the door. On arrival at [place] was noted to have left-sided weakness. Patient had foaming in the mouth. Patient was confused for several hours. No tongue laceration or urinary incontinence. CT scan revealed b/l subdurals and was sent here w stable neuro exam. Patient had returned to the baseline mental status per son but on repeat encounter in ED by MICU, patient seemed to be waxing and waning. Reported that patient had epistaxis last week and stopped taking hydroxyurea and aspirin at that time. Denied fevers, chills, headache, weakness, numbness, vision change, photophobia, neck stiffness, cp, sob, abd pain, n/v/d, dysuria. ED course c/b Tm 101, hypoxemia to 83%, tachycardia to 122, tachypnea to 30s and temporarily placed on bipap. Now sating 95% on 4L. (27 Sep 2019 04:55).  Called for consult for patient being non-verbal and not following commands, at time of exam patient awake, somewhat verbal, AOx1, following some one step commands.    ROS: Pertinent positives in HPI, all other ROS were reviewed and are negative.      Vital Signs Last 24 Hrs  T(C): 36.4 (01 Oct 2019 12:00), Max: 43 (30 Sep 2019 20:00)  T(F): 97.5 (01 Oct 2019 12:00), Max: 109.4 (30 Sep 2019 20:00)  HR: 144 (01 Oct 2019 12:56) (68 - 144)  BP: 153/91 (01 Oct 2019 11:00) (96/52 - 158/66)  BP(mean): 118 (01 Oct 2019 11:00) (69 - 118)  RR: 29 (01 Oct 2019 12:00) (11 - 30)  SpO2: 95% (01 Oct 2019 12:56) (92% - 100%)    GENERAL EXAM:  NEUROLOGICAL EXAM:  MS - AAOX3, speech is fluent repetition in tact, voice slurred (dentures not in place)  Cns - EOMI, face symmetric, v1-v3,   Motor - moving all four extremities  Sensory - LT in tact    Labs:                        8.7    21.69 )-----------( 80       ( 01 Oct 2019 13:17 )             25.5   10-01    132<L>  |  104  |  5<L>  ----------------------------<  85  3.8   |  17<L>  |  0.46<L>    Ca    8.2<L>      01 Oct 2019 06:02  Phos  2.2     10-01  Mg     2.2     10-01    TPro  5.2<L>  /  Alb  2.9<L>  /  TBili  1.0  /  DBili  x   /  AST  245<H>  /  ALT  147<H>  /  AlkPhos  83  10-01    Lipids  A1C  Cardiac Markers    LFTsLIVER FUNCTIONS - ( 29 Sep 2019 17:55 )  Alb: 3.3 g/dL / Pro: 5.9 g/dL / ALK PHOS: 97 U/L / ALT: 159 U/L / AST: 366 U/L / GGT: x        UA  CSF  Immunological Labs    Radiology:  CTH:  Redemonstration of bilateral acute subdural hematomas measuring 0.6 cm on   the right and 0.4 cm on the left, in greatest depth. Tiny bilateral   parafalcine subdural hematomas are also noted. There is minimal leftward   midline shift, unchanged. Ventricle size is unchanged. Otherwise,   age-related involutional changes and chronic microvascular ischemic type   changes are noted. The basal cisterns are patent. The calvarium is   intact. The paranasal sinuses and tympanomastoid cavities appear free of   acute disease.    < from: CT Head No Cont (09.27.19 @ 20:41) >  Redemonstration of bilateral acute subdural hematomas measuring 0.6 cm on   the right and 0.4 cm on the left, in greatest depth. Tiny bilateral   parafalcine subdural hematomas are also noted. There is minimal leftward   midline shift, unchanged. Ventricle size is unchanged. Otherwise,   age-related involutional changes and chronic microvascular ischemic type   changes are noted. The basal cisterns are patent. The calvarium is   intact. The paranasal sinuses and tympanomastoid cavities appear free of   acute disease.    < end of copied text >      Clinical Impression:  Potential seizure foci in the left frontal, left parieto-occipital, and right frontal regions.  Moderate diffuse or multifocal cerebral dysfunction, not specific as to etiology.

## 2019-10-01 NOTE — PROGRESS NOTE ADULT - ATTENDING COMMENTS
Pt is 73 yo WF with h/o MPD on Hydroxyurea and Asa transferred from [place] [address] to [facility] after found unconscious and  with 20% blasts, PLT 40, scr of 2.7. found to have blast crisis with TLS and ADITHYA; pt started on CRRT. During this hospitalization pt with Sz    Resp: Supplemental O2  ID: Pt with E. coli UTI; finish course of Abx (change Meropenem to Ceftriaxone)  HEME: F/u as per Heme/Onc: Hydroxyurea today/ s/p PRBC (anemia) + Platelets (thrombocytopenia)  FEN: Po diet as tolerated  Renal: Follow BUN/Cr and UO/ Will discuss CRRT with Renal  Neuro: MS improving/ Avoid Benzos/ Cont Keppra/ F/u as per Neuro  Social: Daughter at bedside and updated/ Pt is DNR/DNI/ OOB->chair

## 2019-10-01 NOTE — PROGRESS NOTE ADULT - ASSESSMENT
74 year old female with history of MPD on HU and ASA followed by Dr. Edwina Moon([phone]) was brought to [place] ED by EMS after she had syncope and was found to have SDH for which she was transferred to [facility] for further care. At ED, her WBC was 238 with 20% blasts and hematology was consulted for possible leukemia.       #Acute leukemia  #Polycythemia Vera    Spoke to her primary hematologist Dr. Edwina Moon ([phone]).  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  Dr. Moon recommended Jakafi which she refused to take. She was started on hydroxyurea and her CBC has been stable. Her baseline WBC count is 18-19K, and PLT  usually, and levels were similar when he last saw her 3 weeks ago. Patient has refused repeat bone marrow biopsy despite multiple discussion with him. Record from primary hematologist was reviewed.    -BM biopsy and aspiration was done at bedside, will f/u the path result with hemepath team.  -Hold hydroxyurea 2g BID as patient is thrombocytopenic and leukocytosis is improved.  -Negative HIV screen and hep B/C panel.  -For definite treatment, need to discuss with family regarding risk and benefit, as patient is acutely ill with very poor performance status.  -No immediate plan for chemo at this point.    # New SDH, most likely traumatic  -1cm on R and 3mm on L. no midline shift  -Plt transfusion PRN  -Neurosurgery is following, appreciated their input.    # ADITHYA vs CKD vs ADITHYA on CKD   -Cr normalized from 2.7 on admission, patient weaned off from CVVD.   -Management per ICU team and nephrology team. 74 year old female with history of MPD on HU and ASA followed by Dr. Edwina Moon([phone]) was brought to [place] ED by EMS after she had syncope and was found to have SDH for which she was transferred to [facility] for further care. At ED, her WBC was 238 with 20% blasts and hematology was consulted for possible leukemia.       #Acute leukemia  #Polycythemia Vera    Spoke to her primary hematologist Dr. Edwina Moon ([phone]).  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  Dr. Moon recommended Jakafi which she refused to take. She was started on hydroxyurea and her CBC has been stable. Her baseline WBC count is 18-19K, and PLT  usually, and levels were similar when he last saw her 3 weeks ago. Patient has refused repeat bone marrow biopsy despite multiple discussion with him. Record from primary hematologist was reviewed.    -BM biopsy and aspiration was done at bedside, will f/u the path result with hemepath team.  -Hold hydroxyurea as patient is thrombocytopenic and leukocytosis is improved.  -Transfuse as needed. Monitor CBC with differential daily and transfuse to maintain Hb >7, and platelet count >10, >20 if febrile, and >50 prior to procedures.  -Negative HIV screen and hep B/C panel.  -For definite treatment, need to discuss with family regarding risk and benefit, as patient is acutely ill with very poor performance status.  -No immediate plan for chemo at this point.    # New SDH, most likely traumatic  -1cm on R and 3mm on L. no midline shift  -Plt transfusion PRN  -Neurosurgery is following, appreciated their input.    # ADITHYA vs CKD vs ADITHYA on CKD   -Cr normalized from 2.7 on admission, patient weaned off from CVVD.   -Management per ICU team and nephrology team.

## 2019-10-01 NOTE — PROGRESS NOTE ADULT - SUBJECTIVE AND OBJECTIVE BOX
Hematology Follow-up    INTERVAL HPI/OVERNIGHT EVENTS:  Patient S&E at bedside. Patient is lethargic and altered.    VITAL SIGNS:  T(F): 97.5 (10-01-19 @ 12:00)  HR: 144 (10-01-19 @ 12:56)  BP: 153/91 (10-01-19 @ 11:00)  RR: 29 (10-01-19 @ 12:00)  SpO2: 95% (10-01-19 @ 12:56)  Wt(kg): --    PHYSICAL EXAM:    Constitutional: AAOx0, NAD   Eyes: sclera non-icteric  Neck: supple  Respiratory: scattered rhonchi b/l, coarse breath sound, decreased breath sound B/L.  Cardiovascular: RRR, normal S1S2  Gastrointestinal: soft, NTND  Extremities:  no c/c/e  Neurological: Grossly intact  Skin: Normal temperature. Multiple bruise and rash    MEDICATIONS  (STANDING):  ALBUTerol/ipratropium for Nebulization 3 milliLiter(s) Nebulizer every 6 hours  calcium gluconate IVPB 2 Gram(s) IV Intermittent every 6 hours  cefTRIAXone   IVPB 1000 milliGRAM(s) IV Intermittent every 24 hours  chlorhexidine 4% Liquid 1 Application(s) Topical <User Schedule>  CRRT Treatment    <Continuous>  dexmedetomidine Infusion 0.5 MICROgram(s)/kG/Hr (6.525 mL/Hr) IV Continuous <Continuous>  dextrose 10%. 1000 milliLiter(s) (50 mL/Hr) IV Continuous <Continuous>  levETIRAcetam  IVPB 750 milliGRAM(s) IV Intermittent every 12 hours  Phoxillum Filtration BK 4 / 2.5 5000 milliLiter(s) (2000 mL/Hr) CRRT <Continuous>  PrismaSOL Filtration BGK 0 / 2.5 5000 milliLiter(s) (200 mL/Hr) CRRT <Continuous>  PrismaSOL Filtration BGK 4 / 2.5 5000 milliLiter(s) (1200 mL/Hr) CRRT <Continuous>    MEDICATIONS  (PRN):  ondansetron Injectable 4 milliGRAM(s) IV Push every 6 hours PRN Nausea      Alcohol (Unknown)  No Known Drug Allergies  shellfish (Unknown)      LABS:                        8.7    21.69 )-----------( 80       ( 01 Oct 2019 13:17 )             25.5     10-01    132<L>  |  104  |  5<L>  ----------------------------<  85  3.8   |  17<L>  |  0.46<L>    Ca    8.2<L>      01 Oct 2019 06:02  Phos  2.2     10-01  Mg     2.2     10-01    TPro  5.2<L>  /  Alb  2.9<L>  /  TBili  1.0  /  DBili  x   /  AST  245<H>  /  ALT  147<H>  /  AlkPhos  83  10-01    PT/INR - ( 01 Oct 2019 00:16 )   PT: 13.0 sec;   INR: 1.13 ratio         PTT - ( 01 Oct 2019 00:16 )  PTT:27.3 sec Lactate Dehydrogenase, Serum: >2250 U/L (10-01 @ 00:16)        RADIOLOGY & ADDITIONAL TESTS:  Studies reviewed.  < from: Xray Chest 1 View- PORTABLE-Urgent (10.01.19 @ 13:05) >  Impression:    Poor inspiratory effort. The heart appears to be normal in size.   Bilateral pleural effusion. Congestive heart failure. A central line is   seen on the left and the tip is in the superior vena cava.    < end of copied text >

## 2019-10-01 NOTE — PROGRESS NOTE ADULT - ATTENDING COMMENTS
# Anuric ADITHYA-ATN- TLS- on CRRT, BP improved; okay to stop CRRT and consider intermittent HD in next few days  #If still anuric change shiley to permacath  #acidosis- stable; monitor LAcid  #TLS- hyperphos, hypocalcemia- trend labs

## 2019-10-01 NOTE — PROGRESS NOTE ADULT - SUBJECTIVE AND OBJECTIVE BOX
CHIEF COMPLAINT: syncope with b/l SDH, TLS and ADITHYA     Interval Events: Pt became hypothermic to 34.4, warming blanket placed, blood cultures sent. SBP 70s, so fluid pulls were held for some time. BG 70s, started on D10 @50cc/h.    REVIEW OF SYSTEMS:  [ ] Unable to assess ROS because pt's mental status.    OBJECTIVE:  ICU Vital Signs Last 24 Hrs  T(C): 36 (01 Oct 2019 07:00), Max: 43 (30 Sep 2019 20:00)  T(F): 96.8 (01 Oct 2019 07:00), Max: 109.4 (30 Sep 2019 20:00)  HR: 106 (01 Oct 2019 07:00) (68 - 109)  BP: 143/86 (01 Oct 2019 07:00) (96/52 - 159/82)  BP(mean): 109 (01 Oct 2019 07:00) (69 - 113)  ABP: --  ABP(mean): --  RR: 21 (01 Oct 2019 07:00) (11 - 30)  SpO2: 100% (01 Oct 2019 07:00) (91% - 100%)        09-30 @ 07:01  -  10-01 @ 07:00  --------------------------------------------------------  IN: 3090.3 mL / OUT: 1627 mL / NET: 1463.3 mL      CAPILLARY BLOOD GLUCOSE          PHYSICAL EXAM:  General:   HEENT:   Lymph Nodes:  Neck:   Respiratory:   Cardiovascular:   Abdomen:   Extremities:   Skin:   Neurological:  Psychiatry:    LINES: Central line, [facility]    HOSPITAL MEDICATIONS:  Standing Meds:  ALBUTerol/ipratropium for Nebulization 3 milliLiter(s) Nebulizer every 6 hours  calcium gluconate IVPB 2 Gram(s) IV Intermittent every 6 hours  chlorhexidine 4% Liquid 1 Application(s) Topical <User Schedule>  CRRT Treatment    <Continuous>  dexmedetomidine Infusion 0.5 MICROgram(s)/kG/Hr IV Continuous <Continuous>  dextrose 10%. 1000 milliLiter(s) IV Continuous <Continuous>  levETIRAcetam  IVPB 750 milliGRAM(s) IV Intermittent every 12 hours  meropenem  IVPB 1000 milliGRAM(s) IV Intermittent every 8 hours  Phoxillum Filtration BK 4 / 2.5 5000 milliLiter(s) CRRT <Continuous>  PrismaSOL Filtration BGK 0 / 2.5 5000 milliLiter(s) CRRT <Continuous>  PrismaSOL Filtration BGK 4 / 2.5 5000 milliLiter(s) CRRT <Continuous>      PRN Meds:  ondansetron Injectable 4 milliGRAM(s) IV Push every 6 hours PRN      LABS:                        6.6    12.5  )-----------( 73       ( 01 Oct 2019 03:15 )             20.0     Hgb Trend: 6.6<--, 7.3<--, 7.3<--, 8.0<--, 6.6<--  10-01    132<L>  |  104  |  5<L>  ----------------------------<  85  3.8   |  17<L>  |  0.46<L>    Ca    8.2<L>      01 Oct 2019 06:02  Phos  2.2     10-01  Mg     2.2     10-01    TPro  5.2<L>  /  Alb  2.9<L>  /  TBili  1.0  /  DBili  x   /  AST  245<H>  /  ALT  147<H>  /  AlkPhos  83  10-01    Creatinine Trend: 0.46<--, 0.42<--, 0.47<--, 0.55<--, 0.56<--, 0.56<--  PT/INR - ( 01 Oct 2019 00:16 )   PT: 13.0 sec;   INR: 1.13 ratio         PTT - ( 01 Oct 2019 00:16 )  PTT:27.3 sec      Venous Blood Gas:  09-29 @ 11:53  7.41/39/30/24/51  VBG Lactate: 1.6      MICROBIOLOGY:     RADIOLOGY:  [ ] Reviewed and interpreted by me    EKG: CHIEF COMPLAINT: syncope with b/l SDH, TLS and ADITHYA     Interval Events: Pt became hypothermic to 34.4, warming blanket placed, blood cultures sent. SBP 70s, so fluid pulls were held for some time. BG 70s, started on D10 @50cc/h.    REVIEW OF SYSTEMS:  [ ] Unable to assess ROS because pt's mental status.    OBJECTIVE:  ICU Vital Signs Last 24 Hrs  T(C): 36 (01 Oct 2019 07:00), Max: 43 (30 Sep 2019 20:00)  T(F): 96.8 (01 Oct 2019 07:00), Max: 109.4 (30 Sep 2019 20:00)  HR: 106 (01 Oct 2019 07:00) (68 - 109)  BP: 143/86 (01 Oct 2019 07:00) (96/52 - 159/82)  BP(mean): 109 (01 Oct 2019 07:00) (69 - 113)  ABP: --  ABP(mean): --  RR: 21 (01 Oct 2019 07:00) (11 - 30)  SpO2: 100% (01 Oct 2019 07:00) (91% - 100%)        09-30 @ 07:01  -  10-01 @ 07:00  --------------------------------------------------------  IN: 3090.3 mL / OUT: 1627 mL / NET: 1463.3 mL      CAPILLARY BLOOD GLUCOSE    Const: AAOx3, in NAD  Eyes: PERRL, no conjunctival injection  HENT: NCAT  CV: RRR, no obvious murmurs, rubs, or gallops appreciated  Resp: CTAB, no respiratory distress, no wheezes, rales, or rhonchi  GI: Abdomen soft, NTND, no guarding, no CVA tenderness  Extremities: No peripheral edema,  2+ radial and DP pulses  Skin: Warm, well perfused, no rash  MSK: No gross deformities appreciated  Neuro: No focal sensory or motor deficits, CN 2-12 grossly intact  Psych: Appropriate mood and affect     LINES: Central line, PIV    HOSPITAL MEDICATIONS:  Standing Meds:  ALBUTerol/ipratropium for Nebulization 3 milliLiter(s) Nebulizer every 6 hours  calcium gluconate IVPB 2 Gram(s) IV Intermittent every 6 hours  chlorhexidine 4% Liquid 1 Application(s) Topical <User Schedule>  CRRT Treatment    <Continuous>  dexmedetomidine Infusion 0.5 MICROgram(s)/kG/Hr IV Continuous <Continuous>  dextrose 10%. 1000 milliLiter(s) IV Continuous <Continuous>  levETIRAcetam  IVPB 750 milliGRAM(s) IV Intermittent every 12 hours  meropenem  IVPB 1000 milliGRAM(s) IV Intermittent every 8 hours  Phoxillum Filtration BK 4 / 2.5 5000 milliLiter(s) CRRT <Continuous>  PrismaSOL Filtration BGK 0 / 2.5 5000 milliLiter(s) CRRT <Continuous>  PrismaSOL Filtration BGK 4 / 2.5 5000 milliLiter(s) CRRT <Continuous>      PRN Meds:  ondansetron Injectable 4 milliGRAM(s) IV Push every 6 hours PRN      LABS:                        6.6    12.5  )-----------( 73       ( 01 Oct 2019 03:15 )             20.0     Hgb Trend: 6.6<--, 7.3<--, 7.3<--, 8.0<--, 6.6<--  10-01    132<L>  |  104  |  5<L>  ----------------------------<  85  3.8   |  17<L>  |  0.46<L>    Ca    8.2<L>      01 Oct 2019 06:02  Phos  2.2     10-01  Mg     2.2     10-01    TPro  5.2<L>  /  Alb  2.9<L>  /  TBili  1.0  /  DBili  x   /  AST  245<H>  /  ALT  147<H>  /  AlkPhos  83  10-01    Creatinine Trend: 0.46<--, 0.42<--, 0.47<--, 0.55<--, 0.56<--, 0.56<--  PT/INR - ( 01 Oct 2019 00:16 )   PT: 13.0 sec;   INR: 1.13 ratio         PTT - ( 01 Oct 2019 00:16 )  PTT:27.3 sec      Venous Blood Gas:  09-29 @ 11:53  7.41/39/30/24/51  VBG Lactate: 1.6      MICROBIOLOGY:     RADIOLOGY:  [ ] Reviewed and interpreted by me    EKG: CHIEF COMPLAINT: syncope with b/l SDH, TLS and ADITHYA     Interval Events: Pt became hypothermic to 34.4, warming blanket placed, blood cultures sent. SBP 70s, so fluid pulls were held for some time. BG 70s, started on D10 @50cc/h.    REVIEW OF SYSTEMS:  [ ] Unable to assess ROS because pt's mental status.    OBJECTIVE:  ICU Vital Signs Last 24 Hrs  T(C): 36 (01 Oct 2019 07:00), Max: 43 (30 Sep 2019 20:00)  T(F): 96.8 (01 Oct 2019 07:00), Max: 109.4 (30 Sep 2019 20:00)  HR: 106 (01 Oct 2019 07:00) (68 - 109)  BP: 143/86 (01 Oct 2019 07:00) (96/52 - 159/82)  BP(mean): 109 (01 Oct 2019 07:00) (69 - 113)  ABP: --  ABP(mean): --  RR: 21 (01 Oct 2019 07:00) (11 - 30)  SpO2: 100% (01 Oct 2019 07:00) (91% - 100%)        09-30 @ 07:01  -  10-01 @ 07:00  --------------------------------------------------------  IN: 3090.3 mL / OUT: 1627 mL / NET: 1463.3 mL      CAPILLARY BLOOD GLUCOSE    Const: AAOx3, in NAD  Eyes: PERRL, no conjunctival injection  HENT: NCAT  CV: RRR, no obvious murmurs, rubs, or gallops appreciated  Resp: +BL rhonchi, on NC  GI: Abdomen soft, NTND, no guarding, no CVA tenderness  Extremities: no peripheral edema, 2+ radial and DP pulses  Skin: scattered ecchymosis   MSK: No gross deformities appreciated  Neuro: AAOx3,  No focal sensory or motor deficits, moving all extremities, following simple commands  Psych: Appropriate mood and affect     LINES: Central line, PIV    HOSPITAL MEDICATIONS:  Standing Meds:  ALBUTerol/ipratropium for Nebulization 3 milliLiter(s) Nebulizer every 6 hours  calcium gluconate IVPB 2 Gram(s) IV Intermittent every 6 hours  chlorhexidine 4% Liquid 1 Application(s) Topical <User Schedule>  CRRT Treatment    <Continuous>  dexmedetomidine Infusion 0.5 MICROgram(s)/kG/Hr IV Continuous <Continuous>  dextrose 10%. 1000 milliLiter(s) IV Continuous <Continuous>  levETIRAcetam  IVPB 750 milliGRAM(s) IV Intermittent every 12 hours  meropenem  IVPB 1000 milliGRAM(s) IV Intermittent every 8 hours  Phoxillum Filtration BK 4 / 2.5 5000 milliLiter(s) CRRT <Continuous>  PrismaSOL Filtration BGK 0 / 2.5 5000 milliLiter(s) CRRT <Continuous>  PrismaSOL Filtration BGK 4 / 2.5 5000 milliLiter(s) CRRT <Continuous>      PRN Meds:  ondansetron Injectable 4 milliGRAM(s) IV Push every 6 hours PRN      LABS:                        6.6    12.5  )-----------( 73       ( 01 Oct 2019 03:15 )             20.0     Hgb Trend: 6.6<--, 7.3<--, 7.3<--, 8.0<--, 6.6<--  10-01    132<L>  |  104  |  5<L>  ----------------------------<  85  3.8   |  17<L>  |  0.46<L>    Ca    8.2<L>      01 Oct 2019 06:02  Phos  2.2     10-01  Mg     2.2     10-01    TPro  5.2<L>  /  Alb  2.9<L>  /  TBili  1.0  /  DBili  x   /  AST  245<H>  /  ALT  147<H>  /  AlkPhos  83  10-01    Creatinine Trend: 0.46<--, 0.42<--, 0.47<--, 0.55<--, 0.56<--, 0.56<--  PT/INR - ( 01 Oct 2019 00:16 )   PT: 13.0 sec;   INR: 1.13 ratio         PTT - ( 01 Oct 2019 00:16 )  PTT:27.3 sec      Venous Blood Gas:  09-29 @ 11:53  7.41/39/30/24/51  VBG Lactate: 1.6      MICROBIOLOGY:     RADIOLOGY:  [ ] Reviewed and interpreted by me    EKG:

## 2019-10-01 NOTE — PROGRESS NOTE ADULT - ATTENDING COMMENTS
The patient remains medically unstable and C V V HD has been discontinued. Renal service will continue in follow up. She is confused at my visit and has pulmonary congestion by bed side ultrasound. Condition remains poor. She remains thrombocytopenic and neurology has analyzed EEG for seizure risk . Bone marrow results are pending

## 2019-10-01 NOTE — PROGRESS NOTE ADULT - ASSESSMENT
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 Edwina Moon at [facility]) transferred from OSH for b/l SDH after found unconscious and  with 20% blasts, PLT 40, scr of 2.7. found to have blast crisis with TLS. Nephrology consulted for ADITHYA.

## 2019-10-01 NOTE — PROGRESS NOTE ADULT - PROBLEM SELECTOR PLAN 1
Most likely hemodynamically mediated with superimposed tubular injury from TLS.   Presented with scr of 2.8 was at 2.7. Phos elevated, Ldh>2000, uric acid elevated, Decreasing UOP. Started on CVVHDF 09/27/19 due to TLS.  Seems pt TLS improving, wbc coming down, uric acid/k/baldev stable. Anuric,       HemOnc follow up, hydroxyurea to be given  avoid nephrotoxic meds, adjust meds based on CRRT.  Serial bladder scan.     Obtain renal sono with arterial and venous doppler given hyperviscosity causing renal vein or artery thrombosis.

## 2019-10-01 NOTE — PROGRESS NOTE ADULT - ASSESSMENT
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    #NEURO  Waxing MS. Some improvement - able to follow simple commands. But becomes agitated, pulls on lines, so has required Precedex gtt.  BL subdural hematomas  -no plan for surgical intervention  -repeat CTH stable  -seizure on 9/27 --> keppra 750 bid, s/p ativan x1  -keep Pl>50  - f/u EEG  - f/u procalcitonin   - f/u neuro    #RESP  Hypoxic respiratory failure on 4-5 L NC  No history of lung disease, but possible leukostasis from blast crisis?  Pt DNR/DNI, will not do aggressive measures at this time   -pulmonary edema ON --> s/p BiPAP and lasix  -Tolerating 5L NC daytime; BIPAP nightly 50% O2, IP 10, EP 5    #CV  BP stable  s/p levo for hypotension  TTE: mild AR, mild-mod TR, EF WNL  off pressors    #Renal:   ADITHYA: most likely hemodynamically mediated with superimposed tubular injury from TLS  -On CVVHD due to TLS  -Monitor uric acid, phos, K  Anuric  -Dc'd leon  Hyperphosphatemia: Monitor. If continues to drop, consider switching to Phoxylum.  Acidemia: Stable on CRRT  Hypocalcemia: Monitor Ca, keep ionized Ca>1. Ca gluconate 2 g q6-8h.    #ID  Febrile with blast crisis   Given immunocompromised state, covered broadly with vanc/gio  -Legionella negative. Azithro dc'd  -UCx E.coli>100,000  -CT: PNA  -Vanc dc'd. C/w Meropenem (Day 4)    #GI  NPO at this time given worsening MS   As per daughter no NGT   D5 @ 50ml/h  Failed speech & swallow x 2 yesterday. Will attempt again today.    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 F/u BM results.  No role for leukopheresis at this time per heme given blast % x WBC not over 100  In TLS, on CVVHD for hyperkalemia. Monitoring hypocalcemia and giving IV Ca gluconate q6h.  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    #NEURO  Waxing MS - improved today. AAOx3, following simple commands, moving all extremities  BL subdural hematomas  -no plan for surgical intervention  -repeat CTH stable  -seizure on 9/27 --> keppra 750 bid, s/p ativan x1  -keep Pl>50  - EEG 09-29-19 14:20 to 9-30-19 08:00: No seizures recorded. Potential seizure foci in the left frontal, left parieto-occipital, and right frontal regions. Moderate diffuse or multifocal cerebral dysfunction, not specific as to etiology.  - f/u neuro    #RESP  Hypoxic respiratory failure on 4-5 L NC  No history of lung disease, but possible leukostasis from blast crisis?  Pt DNR/DNI, will not do aggressive measures at this time   -pulmonary edema ON --> s/p BiPAP and lasix  -Tolerating 5L NC. Dc'd nightly BIPAP.    #CV  BP stable  s/p levo for hypotension  TTE: mild AR, mild-mod TR, EF WNL  off pressors    #Renal:   ADITHYA: most likely hemodynamically mediated with superimposed tubular injury from TLS  -On CVVHD due to TLS  -Monitor uric acid, phos, K  Anuric  -Dc'd leon. Output ~200c daily on CVVHD.  Hyperphosphatemia: Monitor. If continues to drop, consider switching to Phoxylum.  Acidemia: Stable on CRRT  Hypocalcemia: Monitor Ca, keep ionized Ca>1. Ca gluconate 2 g q6-8h.    #ID  Febrile with blast crisis   Given immunocompromised state, covered broadly with vanc/gio  -Legionella negative. Azithro dc'd  -UCx E.coli>100,000  -CT: PNA  -Vanc dc'd. Meropenem dc'd  -Switched to Rocephin 10/1 (E.coli is sensitive)    #GI  Passed bedside swallow test. 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 F/u BM results.  No role for leukopheresis at this time per heme given blast % x WBC not over 100  In TLS, on CVVHD for hyperkalemia. Monitoring hypocalcemia and giving IV Ca gluconate q6h.  Labs q6h  s/p PRBC, platelets, and DDAVP for thrombocytopenia and anemia  monitor uric acid, consider more rasburicase if rises  -Has required platelet transfusions for past 3 days, goal Pl>50  -s/p 1u PRBCs ON    #Endo  FS q4 hr in setting of NPO and hypoglycemic episodes  D5 @50ml/h  Started on D10 @50ml/h 9/30    #GOC  DNR/DNI  No NGT  Daughter is HCP

## 2019-10-01 NOTE — PROGRESS NOTE ADULT - ASSESSMENT
74y F w/ PMH MDS transfer from [place] for b/l subdural hematomas. Patient says she doesn’t know what was happening earlier today and how she ended up in the hospital. Patient is AO to self, hospital, and September. Patient states she feels “shitty”. History obtained per chart review as patient is not cooperative with history, not responding to questions, fixated on discomfort from minimal contact from ongoing interventions (placement of leon, bedside ultrasound, bipap, blood draws, etc), no family at bedside, and unable to reach family via available emergency contact. Patient remembered going to [employer] to get oatmeal then remembers being the hospital. Per EMS pt was found in her parked car not responding w visible oatmeal in her mouth by a bystander who called 911 when she would not respond to knock on window. On EMS arrival pt was awake and alert but confused. Took a while before she was able to understand how to open the door. On arrival at [place] was noted to have left-sided weakness. Patient had foaming in the mouth. Patient was confused for several hours. No tongue laceration or urinary incontinence. CT scan revealed b/l subdurals and was sent here w stable neuro exam. Patient had returned to the baseline mental status per son but on repeat encounter in ED by MICU, patient seemed to be waxing and waning. Reported that patient had epistaxis last week and stopped taking hydroxyurea and aspirin at that time. Denied fevers, chills, headache, weakness, numbness, vision change, photophobia, neck stiffness, cp, sob, abd pain, n/v/d, dysuria. ED course c/b Tm 101, hypoxemia to 83%, tachycardia to 122, tachypnea to 30s and temporarily placed on bipap.    Pt much improved. awake and alert, oriented x 3, no focalizing deficits on exam. EEG shows seizure foci in the left frontal left parieto occipital and right frontal regions. No seizures.     Impression:  - c/w Keppra 750mg bid  -Seizure precautions no driving for one year  -Neurosurgery recommendation no surgical intervention   - FU with outpatient neurology at [address] [place] [address] - [phone] 74y F w/ PMH MDS transfer from [place] for b/l subdural hematomas. Patient says she doesn’t know what was happening earlier today and how she ended up in the hospital. Patient is AO to self, hospital, and September. Patient states she feels “shitty”. History obtained per chart review as patient is not cooperative with history, not responding to questions, fixated on discomfort from minimal contact from ongoing interventions (placement of leon, bedside ultrasound, bipap, blood draws, etc), no family at bedside, and unable to reach family via available emergency contact. Patient remembered going to [place] to get oatmeal then remembers being the hospital. Per EMS pt was found in her parked car not responding w visible oatmeal in her mouth by a bystander who called 911 when she would not respond to knock on window. On EMS arrival pt was awake and alert but confused. Took a while before she was able to understand how to open the door. On arrival at [place] was noted to have left-sided weakness. Patient had foaming in the mouth. Patient was confused for several hours. No tongue laceration or urinary incontinence. CT scan revealed b/l subdurals and was sent here w stable neuro exam. Patient had returned to the baseline mental status per son but on repeat encounter in ED by MICU, patient seemed to be waxing and waning. Reported that patient had epistaxis last week and stopped taking hydroxyurea and aspirin at that time. Denied fevers, chills, headache, weakness, numbness, vision change, photophobia, neck stiffness, cp, sob, abd pain, n/v/d, dysuria. ED course c/b Tm 101, hypoxemia to 83%, tachycardia to 122, tachypnea to 30s and temporarily placed on bipap.    Pt much improved. awake and alert, oriented x 3, no focalizing deficits on exam. EEG shows seizure foci in the left frontal left parieto occipital and right frontal regions. No seizures.     Impression:  - c/w Keppra 750mg bid  -Seizure precautions, no driving for one year - discussed with Daughter and pt at bedside  -No surgical intervention per NSx  - FU with outpatient neurology at [address] [place] [address] [phone]

## 2019-10-02 NOTE — PROGRESS NOTE ADULT - SUBJECTIVE AND OBJECTIVE BOX
[facility] DIVISION OF KIDNEY DISEASES AND HYPERTENSION -- FOLLOW UP NOTE  --------------------------------------------------------------------------------  Chief Complaint: Syncope with bilateral SDH    24 hour events/subjective: Patient evaluated at bedside         PAST HISTORY  --------------------------------------------------------------------------------  No significant changes to PMH, PSH, FHx, SHx, unless otherwise noted    ALLERGIES & MEDICATIONS  --------------------------------------------------------------------------------  Allergies    Alcohol (Unknown)  No Known Drug Allergies  shellfish (Unknown)    Intolerances      Standing Inpatient Medications  ALBUTerol/ipratropium for Nebulization 3 milliLiter(s) Nebulizer every 6 hours  calcium gluconate IVPB 2 Gram(s) IV Intermittent every 6 hours  cefTRIAXone   IVPB 1000 milliGRAM(s) IV Intermittent every 24 hours  chlorhexidine 4% Liquid 1 Application(s) Topical <User Schedule>  dexmedetomidine Infusion 0.5 MICROgram(s)/kG/Hr IV Continuous <Continuous>  dextrose 10%. 1000 milliLiter(s) IV Continuous <Continuous>  levETIRAcetam  IVPB 750 milliGRAM(s) IV Intermittent every 12 hours    REVIEW OF SYSTEMS  --------------------------------------------------------------------------------  Unable to obtain    All other systems were reviewed and are negative, except as noted.    VITALS/PHYSICAL EXAM  --------------------------------------------------------------------------------  T(C): 36.8 (10-02-19 @ 04:00), Max: 37.7 (10-01-19 @ 16:00)  HR: 133 (10-02-19 @ 07:00) (111 - 144)  BP: 144/83 (10-02-19 @ 07:00) (105/55 - 170/100)  RR: 30 (10-02-19 @ 07:00) (19 - 35)  SpO2: 100% (10-02-19 @ 07:00) (93% - 100%)  Wt(kg): --        10-01-19 @ 07:01  -  10-02-19 @ 07:00  --------------------------------------------------------  IN: 2675 mL / OUT: 3635 mL / NET: -960 mL    Physical Exam:  	Gen: NAD,   	HEENT: On bipap  	Pulm: CTA B/L  	CV: RRR, S1S2; no rub  	Abd: +BS, soft, nontender/nondistended  	: No suprapubic tenderness  	Ext: no edema  	Neuro: awake  	Psych: alert   	Skin: Warm,   	Vascular access: LIJ nontunneled HD catheter, no overlying hematoma    LABS/STUDIES  --------------------------------------------------------------------------------              7.9    28.25 >-----------<  109      [10-02-19 @ 03:59]              23.8     134  |  99  |  11  ----------------------------<  128      [10-02-19 @ 00:14]  4.2   |  20  |  1.06        Ca     10.1     [10-02-19 @ 00:14]      Mg     2.1     [10-02-19 @ 00:14]      Phos  3.5     [10-02-19 @ 00:14]    TPro  6.2  /  Alb  3.5  /  TBili  1.0  /  DBili  x   /  AST  326  /  ALT  237  /  AlkPhos  111  [10-02-19 @ 00:14]    PT/INR: PT 13.1 , INR 1.14       [10-02-19 @ 00:14]  PTT: 28.0       [10-02-19 @ 00:14]    Uric acid 2.0      [10-02-19 @ 00:14]  LDH >2250      [10-02-19 @ 00:14]    Creatinine Trend:  SCr 1.06 [10-02 @ 00:14]  SCr 0.80 [10-01 @ 17:23]  SCr 0.46 [10-01 @ 06:02]  SCr 0.42 [10-01 @ 00:16]  SCr 0.47 [09-30 @ 18:25]    Urinalysis - [09-27-19 @ 01:46]      Color Yellow / Appearance Slightly Turbid / SG 1.018 / pH 6.5      Gluc Negative / Ketone Trace  / Bili Negative / Urobili Negative       Blood Moderate / Protein 100 / Leuk Est Moderate / Nitrite Negative      RBC 6-10 / WBC >50 / Hyaline 0-2 / Gran  / Sq Epi  / Non Sq Epi Few / Bacteria Many        HBsAb Nonreact      [09-27-19 @ 05:35]  HBsAg Nonreact      [09-27-19 @ 05:35]  HCV 0.18, Nonreact      [09-27-19 @ 05:35]  HIV Nonreact      [09-27-19 @ 02:50] [facility] DIVISION OF KIDNEY DISEASES AND HYPERTENSION -- FOLLOW UP NOTE  --------------------------------------------------------------------------------  Chief Complaint: Syncope with bilateral SDH    24 hour events/subjective: Patient evaluated at bedside, on Bipap. Patient without signs of respiratory distress but remains tachycardic to 130s. Not answering questions at this time. Patient was discontinued off CRRT yesterday around noon, developed pulmonary edema around 3 PM. Patient had SCUF performed with 2L UF.    PAST HISTORY  --------------------------------------------------------------------------------  No significant changes to PMH, PSH, FHx, SHx, unless otherwise noted    ALLERGIES & MEDICATIONS  --------------------------------------------------------------------------------  Allergies    Alcohol (Unknown)  No Known Drug Allergies  shellfish (Unknown)    Intolerances      Standing Inpatient Medications  ALBUTerol/ipratropium for Nebulization 3 milliLiter(s) Nebulizer every 6 hours  calcium gluconate IVPB 2 Gram(s) IV Intermittent every 6 hours  cefTRIAXone   IVPB 1000 milliGRAM(s) IV Intermittent every 24 hours  chlorhexidine 4% Liquid 1 Application(s) Topical <User Schedule>  dexmedetomidine Infusion 0.5 MICROgram(s)/kG/Hr IV Continuous <Continuous>  dextrose 10%. 1000 milliLiter(s) IV Continuous <Continuous>  levETIRAcetam  IVPB 750 milliGRAM(s) IV Intermittent every 12 hours    REVIEW OF SYSTEMS  --------------------------------------------------------------------------------  Unable to obtain    All other systems were reviewed and are negative, except as noted.    VITALS/PHYSICAL EXAM  --------------------------------------------------------------------------------  T(C): 36.8 (10-02-19 @ 04:00), Max: 37.7 (10-01-19 @ 16:00)  HR: 133 (10-02-19 @ 07:00) (111 - 144)  BP: 144/83 (10-02-19 @ 07:00) (105/55 - 170/100)  RR: 30 (10-02-19 @ 07:00) (19 - 35)  SpO2: 100% (10-02-19 @ 07:00) (93% - 100%)  Wt(kg): --    10-01-19 @ 07:01  -  10-02-19 @ 07:00  --------------------------------------------------------  IN: 2675 mL / OUT: 3635 mL / NET: -960 mL    Physical Exam:  	Gen: NAD,   	HEENT: On bipap  	Pulm: CTA B/L  	CV: RRR, S1S2; no rub  	Abd: +BS, soft, nontender/nondistended  	: No suprapubic tenderness  	Ext: no edema  	Neuro: awake  	Psych: alert   	Skin: Warm,   	Vascular access: LIJ nontunneled HD catheter, no overlying hematoma    LABS/STUDIES  --------------------------------------------------------------------------------              7.9    28.25 >-----------<  109      [10-02-19 @ 03:59]              23.8     134  |  99  |  11  ----------------------------<  128      [10-02-19 @ 00:14]  4.2   |  20  |  1.06        Ca     10.1     [10-02-19 @ 00:14]      Mg     2.1     [10-02-19 @ 00:14]      Phos  3.5     [10-02-19 @ 00:14]    TPro  6.2  /  Alb  3.5  /  TBili  1.0  /  DBili  x   /  AST  326  /  ALT  237  /  AlkPhos  111  [10-02-19 @ 00:14]    PT/INR: PT 13.1 , INR 1.14       [10-02-19 @ 00:14]  PTT: 28.0       [10-02-19 @ 00:14]    Uric acid 2.0      [10-02-19 @ 00:14]  LDH >2250      [10-02-19 @ 00:14]    Creatinine Trend:  SCr 1.06 [10-02 @ 00:14]  SCr 0.80 [10-01 @ 17:23]  SCr 0.46 [10-01 @ 06:02]  SCr 0.42 [10-01 @ 00:16]  SCr 0.47 [09-30 @ 18:25] [facility] DIVISION OF KIDNEY DISEASES AND HYPERTENSION -- FOLLOW UP NOTE  --------------------------------------------------------------------------------  Chief Complaint: Syncope with bilateral SDH    24 hour events/subjective: Patient evaluated at bedside, on Bipap. Patient without signs of respiratory distress but remains tachycardic to 130s. Not answering questions at this time. Patient was discontinued off CRRT yesterday around noon, developed pulmonary edema around 3 PM. Patient had  UF performed with 2L UF.    PAST HISTORY  --------------------------------------------------------------------------------  No significant changes to PMH, PSH, FHx, SHx, unless otherwise noted    ALLERGIES & MEDICATIONS  --------------------------------------------------------------------------------  Allergies    Alcohol (Unknown)  No Known Drug Allergies  shellfish (Unknown)    Intolerances      Standing Inpatient Medications  ALBUTerol/ipratropium for Nebulization 3 milliLiter(s) Nebulizer every 6 hours  calcium gluconate IVPB 2 Gram(s) IV Intermittent every 6 hours  cefTRIAXone   IVPB 1000 milliGRAM(s) IV Intermittent every 24 hours  chlorhexidine 4% Liquid 1 Application(s) Topical <User Schedule>  dexmedetomidine Infusion 0.5 MICROgram(s)/kG/Hr IV Continuous <Continuous>  dextrose 10%. 1000 milliLiter(s) IV Continuous <Continuous>  levETIRAcetam  IVPB 750 milliGRAM(s) IV Intermittent every 12 hours    REVIEW OF SYSTEMS  --------------------------------------------------------------------------------  Unable to obtain    All other systems were reviewed and are negative, except as noted.    VITALS/PHYSICAL EXAM  --------------------------------------------------------------------------------  T(C): 36.8 (10-02-19 @ 04:00), Max: 37.7 (10-01-19 @ 16:00)  HR: 133 (10-02-19 @ 07:00) (111 - 144)  BP: 144/83 (10-02-19 @ 07:00) (105/55 - 170/100)  RR: 30 (10-02-19 @ 07:00) (19 - 35)  SpO2: 100% (10-02-19 @ 07:00) (93% - 100%)  Wt(kg): --    10-01-19 @ 07:01  -  10-02-19 @ 07:00  --------------------------------------------------------  IN: 2675 mL / OUT: 3635 mL / NET: -960 mL    Physical Exam:  	Gen: NAD,   	HEENT: On bipap  	Pulm: CTA B/L  	CV: RRR, S1S2; no rub  	Abd: +BS, soft, nontender/nondistended  	: No suprapubic tenderness  	Ext: no edema  	Neuro: awake  	Psych: alert   	Skin: Warm,   	Vascular access: LIJ nontunneled HD catheter, no overlying hematoma    LABS/STUDIES  --------------------------------------------------------------------------------              7.9    28.25 >-----------<  109      [10-02-19 @ 03:59]              23.8     134  |  99  |  11  ----------------------------<  128      [10-02-19 @ 00:14]  4.2   |  20  |  1.06        Ca     10.1     [10-02-19 @ 00:14]      Mg     2.1     [10-02-19 @ 00:14]      Phos  3.5     [10-02-19 @ 00:14]    TPro  6.2  /  Alb  3.5  /  TBili  1.0  /  DBili  x   /  AST  326  /  ALT  237  /  AlkPhos  111  [10-02-19 @ 00:14]    PT/INR: PT 13.1 , INR 1.14       [10-02-19 @ 00:14]  PTT: 28.0       [10-02-19 @ 00:14]    Uric acid 2.0      [10-02-19 @ 00:14]  LDH >2250      [10-02-19 @ 00:14]    Creatinine Trend:  SCr 1.06 [10-02 @ 00:14]  SCr 0.80 [10-01 @ 17:23]  SCr 0.46 [10-01 @ 06:02]  SCr 0.42 [10-01 @ 00:16]  SCr 0.47 [09-30 @ 18:25]

## 2019-10-02 NOTE — PROGRESS NOTE ADULT - SUBJECTIVE AND OBJECTIVE BOX
Hematology Follow-up    INTERVAL HPI/OVERNIGHT EVENTS:  Patient S&E at bedside. Patient is on BIPAP and lethargic.      VITAL SIGNS:  T(F): 98.8 (10-02-19 @ 12:00)  HR: 134 (10-02-19 @ 14:00)  BP: 132/94 (10-02-19 @ 14:00)  RR: 28 (10-02-19 @ 14:00)  SpO2: 99% (10-02-19 @ 14:00)  Wt(kg): --      PHYSICAL EXAM:    Constitutional: AAOx0, lethargic and barely verbal   Neck: supple  Respiratory: b/l diffuse rales  Cardiovascular: RRR, normal S1S2  Gastrointestinal: soft, NTND  Extremities:  no c/c/e  Skin: Normal temperature    MEDICATIONS  (STANDING):  ALBUTerol/ipratropium for Nebulization 3 milliLiter(s) Nebulizer every 6 hours  calcium gluconate IVPB 2 Gram(s) IV Intermittent every 6 hours  cefTRIAXone   IVPB 1000 milliGRAM(s) IV Intermittent every 24 hours  chlorhexidine 4% Liquid 1 Application(s) Topical <User Schedule>  dexmedetomidine Infusion 0.5 MICROgram(s)/kG/Hr (6.525 mL/Hr) IV Continuous <Continuous>  dextrose 10%. 1000 milliLiter(s) (50 mL/Hr) IV Continuous <Continuous>  levETIRAcetam  IVPB 750 milliGRAM(s) IV Intermittent every 12 hours    MEDICATIONS  (PRN):  ondansetron Injectable 4 milliGRAM(s) IV Push every 6 hours PRN Nausea      Alcohol (Unknown)  No Known Drug Allergies  shellfish (Unknown)      LABS:                        7.9    28.25 )-----------( 109      ( 02 Oct 2019 03:59 )             23.8     10-02    135  |  98  |  17  ----------------------------<  154<H>  3.7   |  23  |  1.33<H>    Ca    10.3      02 Oct 2019 10:53  Phos  2.2     10-02  Mg     1.8     10-02    TPro  6.5  /  Alb  3.7  /  TBili  1.1  /  DBili  x   /  AST  257<H>  /  ALT  216<H>  /  AlkPhos  126<H>  10-02    PT/INR - ( 02 Oct 2019 00:14 )   PT: 13.1 sec;   INR: 1.14 ratio         PTT - ( 02 Oct 2019 00:14 )  PTT:28.0 sec Lactate Dehydrogenase, Serum: >2250 U/L (10-02 @ 00:14)        RADIOLOGY & ADDITIONAL TESTS:  Studies reviewed.    Hematopathology Report (09.27.19 @ 12:52)    Hematopathology Report:   ACCESSION No:  [id number]    CLIVE SIGALA                      3        Hematopathology Report          Final Diagnosis  1, 2. 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    See note and description.    Diagnostic note:  Review of the peripheral blood shows marked leukocytosis with  myeloid left shift, dysgranulopoiesis, mild increase in  monocytes, and mild immaturity.  Flow cytometry of the peripheral  blood shows 5% myeloblasts, decreased myeloid granularity, and 9%  monocytic cells.  Bone marrow biopsy is markedly hypercellular  with myeloid hyperplasia with left shift and maturition,  decreased erythroid elements with mild dyserythropoiesis,  decreased megakaryocytes with dysplastic morphology, and mild  increase in immature cells.    In summary, this case shows clinical and morphological evidence  of overlapping myeloproliferative and myelodysplastic features.  Suggest correlation with clinical and cytogenetic findings.  This case was reviewed for  by Dr. Ponce who  concurs with the diagnosis on 10/02/2019.  Pending special stains (reticulin and trichrom) and IHCs (CD14,  Cd15, MPO).    Comprehensive report with results of pending ancillary studies to  follow.    Ancillary studies  Flow cytometry of peripheral blood shows decreased myeloid  granularity. There are 5% myeloblasts (positive for HLA-DR, CD34,  , CD33, CD13; negative CD4, CD15, CD56, MPO) and 9%  monocytic cells (positive for CD14, dimmer CD33; negative CD15).    Immunohistochemical stains: Immunohistochemical stains (CD34,  , Factor VIII) were performed on block 1A.  There is mild  increase in immaturity (CD34++), accounting for  approximately 5% to 6% of cells.   also stains mast cells  and promyelocytes.  Factor VIII shows decreased megakaryocytes  including hypolobated forms.    Microscopic description:  1. Biopsy: Sections of biopsy show marked hypercellularity (95%)  with myeloid hyperplasia with left shift and maturation,  decreased erythroid elements, decreased megakaryocytes, mild  immaturity, some monocytic cells, and    CLIVE SIGALA 3        Hematopathology Report    decreased iron stores.  There is increase in tangible body  macrophages and possible fibrosis.    2. Touch imprint: Cellular specimen with myeloid predominance  with left shift and maturation, decreased erythroid elements,  mild dyserythropoiesis, scattered monocytic cells, few  megakaryocytes, and mild immaturity.    Bone Marrow Aspirate Differential: (200 Cells).  Type            %    Normal*  Blast                7%   0-3  Neutrophil and  Precursors    79%  33-63  Eosinophil           1%   1-5  Basophil        0%   0-1  Pronormoblast        1%   0-2  Normoblast           5%   15-25  Monocyte        6%   0-2  Lymphocyte           1%   10-15  Plasma cell          0%   0-1  *Adult Range    Comment  Iron stain (examined to evaluate for iron stores; see microscopic  description) and Giemsa stain (shows appropriate staining  pattern) are performed and evaluated on block(s): 1A.    Slide(s) with built in immunohistochemical study control(s) and  negative control associated with this case has/have been verified  by the sign out pathologist.  For slide(s) without built in controls positive control slides  has/have been reviewed and approved by immunohistochemistry lab  These immunohistochemical/in-situ hybridization tests have been  developed and their performance characteristics determined by  [facility], Department of Pathology,  Division of Immunopathology, [address] [place] [address].  It has not been cleared or approved by the U.S. Food  and Drug Administration.  The FDA has determined that such  clearance or approval is not necessary.  This test is used for  clinical purposes.  The laboratory is certified under the CLIA-88  as qualified to perform high complexity clinical              CLIVE SIGALA        Hematopathology Report          testing.    Verified by: Feliberto Bryan  (Electronic Signature)  Reported on: 10/02/19 11:49 EDT, [address] 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 The specimen is received in bouin's fixative and the specimen  container is labeled: Left hip bone marrow biopsy. It consists of  a 0.6 x 0.2 cm bone marrow core without blood clot.  Entirely  submitted.  One cassette.    In addition to other data that may appear on the specimen  container, the label has been inspected to confirm the presence  of the patient's name and date of birth.  Gretchen Sun 09/27/2019 18:36

## 2019-10-02 NOTE — PROGRESS NOTE ADULT - ASSESSMENT
74 year old female with history of MPD on HU and ASA followed by Dr. Edwina Moon([phone]) was brought to [facility] ED by EMS after she had syncope and was found to have SDH for which she was transferred to [facility] for further care. At ED, her WBC was 238 with 20% blasts and hematology was consulted for possible leukemia.         #Polycythemia Vera/MPD  -Bone marrow biopsy result shows; clinical and morphological evidence of overlapping myeloproliferative and myelodysplastic features. Leukemia is ruled out.  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  -Hold hydroxyurea as patient is thrombocytopenic and leukocytosis is improved.  -Transfuse as needed. Monitor CBC with differential daily and transfuse to maintain Hb >7, and platelet count >10, >20 if febrile, and >50 prior to procedures.  -Patient is not a candidate for chemo treatment at this time.   -Would more benefit from palliative care at this point, need to discuss with family about further plans. Would appreciate input from palliative care team.    # New SDH, most likely traumatic  -1cm on R and 3mm on L. no midline shift  -Plt transfusion PRN  -Neurosurgery is following, appreciated their input.    # ADITHYA vs CKD vs ADITHYA on CKD   -Cr normalized from 2.7 on admission, patient weaned off from CVVD however Cr is going up again.  -Management per ICU team and nephrology team.

## 2019-10-02 NOTE — PROVIDER CONTACT NOTE (OTHER) - SITUATION
Pt consistently tachycardic since noon. Pt received HD finishing at 2100. Pt was agitated. Gave ativan for agitation.

## 2019-10-02 NOTE — OCCUPATIONAL THERAPY INITIAL EVALUATION ADULT - PLANNED THERAPY INTERVENTIONS, OT EVAL
ROM/ADL retraining/balance training/cognitive, visual perceptual/bed mobility training/transfer training/strengthening

## 2019-10-02 NOTE — PROGRESS NOTE ADULT - ATTENDING COMMENTS
Pt is 75 yo WF with h/o MPD on Hydroxyurea and Asa transferred from [place] [address] to [facility] after found unconscious and  with 20% blasts, PLT 40, scr of 2.7. found to have blast crisis with TLS and ADITHYA; pt started on CRRT. During this hospitalization pt with Sz. Yesterday pt with flash pulm edema    Resp: Cont BiPAP  ID: Pt with E. coli UTI; finish course of Abx (change Meropenem to Ceftriaxone)  HEME: F/u as per Heme/Onc: No Hydroxyurea today/ s/p PRBC (anemia) + Platelets (thrombocytopenia)  FEN: NPO while on BiPAP  Renal: Pt given Lasix today/ May need intermittent HD vs CRRT/ Follow BUN/Cr and UO/ F/u as per Renal  Neuro: Lethargic was given Ativan/ Avoid Benzos/ Cont Keppra/ Neuro f/u prn  Social: Pt is DNR/DNI .

## 2019-10-02 NOTE — PROGRESS NOTE ADULT - PROBLEM SELECTOR PLAN 4
In the setting of TLS  Monitor Ca, keep Ionized calcium >1. Calcium gluconate 2 g Q6-8 hours as needed

## 2019-10-02 NOTE — OCCUPATIONAL THERAPY INITIAL EVALUATION ADULT - PERTINENT HX OF CURRENT PROBLEM, REHAB EVAL
74y F w/ PMH MDS transfer from [place] for b/l subdural hematomas. Per EMS pt was found in her parked car not responding w visible oatmeal in her mouth by a bystander who called 911 when she would not respond to knock on window. On EMS arrival pt was awake and alert but confused. On arrival at [place] was noted to have left-sided weakness.

## 2019-10-02 NOTE — PROGRESS NOTE ADULT - ASSESSMENT
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 Edwina Moon at [facility]) transferred from [facility] for b/l SDH after found unconscious and  with 20% blasts, PLT 40, scr of 2.7. found to have blast crisis with TLS. Nephrology consulted for ADITHYA.

## 2019-10-02 NOTE — PROGRESS NOTE ADULT - ASSESSMENT
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    #NEURO  [place]axing MS - improved today. AAOx3, following simple commands, moving all extremities  BL subdural hematomas  -no plan for surgical intervention  -repeat CTH stable  -seizure on 9/27 --> keppra 750 bid, s/p ativan x1  -keep Pl>50  - EEG 09-29-19 14:20 to 9-30-19 08:00: No seizures recorded. Potential seizure foci in the left frontal, left parieto-occipital, and right frontal regions. Moderate diffuse or multifocal cerebral dysfunction, not specific as to etiology.  - f/u neuro    #RESP  Hypoxic respiratory failure on 4-5 L NC  No history of lung disease, but possible leukostasis from blast crisis?  Pt DNR/DNI, will not do aggressive measures at this time   -pulmonary edema ON --> s/p BiPAP and lasix  -Tolerating 5L NC. Dc'd nightly BIPAP.    #CV  BP stable  s/p levo for hypotension  TTE: mild AR, mild-mod TR, EF WNL  off pressors  persistently tachy ON - s/p IV Lopressor. Start on home Metoprolol?    #Renal:   ADITHYA: most likely hemodynamically mediated with superimposed tubular injury from TLS  -On CVVHD due to TLS  -Monitor uric acid, phos, K  Anuric  -Dc'd leon. Output ~200c daily on CVVHD.  Hyperphosphatemia: Monitor. If continues to drop, consider switching to Phoxylum.  Acidemia: Stable on CRRT  Hypocalcemia: Monitor Ca, keep ionized Ca>1. Ca gluconate 2 g q6-8h.    #ID  Febrile with blast crisis   Given immunocompromised state, covered broadly with vanc/gio  -Legionella negative. Azithro dc'd  -UCx E.coli>100,000  -CT: PNA  -Vanc dc'd. Meropenem dc'd  -Switched to Rocephin 10/1 (E.coli is sensitive)    #GI  Passed bedside swallow test. 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 F/u BM results.  No role for leukopheresis at this time per heme given blast % x WBC not over 100  In TLS, on CVVHD for hyperkalemia. Monitoring hypocalcemia and giving IV Ca gluconate q6h.  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    #NEURO  [place] MS - AAOx0 today  BL subdural hematomas  -no plan for surgical intervention  -repeat CTH stable  -seizure on 9/27 --> keppra 750 bid, s/p ativan x1  -keep Pl>50  - EEG 09-29-19 14:20 to 9-30-19 08:00: No seizures recorded. Potential seizure foci in the left frontal, left parieto-occipital, and right frontal regions. Moderate diffuse or multifocal cerebral dysfunction, not specific as to etiology.  - f/u neuro    #RESP  Hypoxic respiratory failure on 4-5 L NC  No history of lung disease, but possible leukostasis from blast crisis?  Pt DNR/DNI, will not do aggressive measures at this time   -pulmonary edema ON --> s/p BiPAP and lasix  -Back on BIPAP    #CV  BP stable  s/p levo for hypotension  TTE: mild AR, mild-mod TR, EF WNL  off pressors  persistently tachy ON - s/p IV Lopressor. Will consider starting on Metoprolol  POCUS showing decreased systolic function  Will repeat TTE    #Renal:   ADITHYA: most likely hemodynamically mediated with superimposed tubular injury from TLS  -Monitor uric acid, phos, K  Anuric  -Dc'd edward  Hyperphosphatemia: Monitor. If continues to drop, consider switching to Phoxylum.  Acidemia: Stable on CRRT  Hypocalcemia: Monitor Ca, keep ionized Ca>1. Ca gluconate 2 g q6-8h.  Received IHD yesterday  Will give Lasix and reassess. Considering IHD vs. CVHD    #ID  Febrile with blast crisis   Given immunocompromised state, covered broadly with vanc/gio  -Legionella negative. Azithro dc'd  -UCx E.coli>100,000  -CT: PNA  -Vanc dc'd. Meropenem dc'd  -Switched to Rocephin 10/1 (E.coli is sensitive)    #GI  Passed bedside swallow test. 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 F/u BM results.  No role for leukopheresis at this time per heme given blast % x WBC not over 100  In TLS, on CVVHD for hyperkalemia. Monitoring hypocalcemia and giving IV Ca gluconate q6h.  Labs q6h  s/p PRBC, platelets, and DDAVP for thrombocytopenia and anemia  monitor uric acid, consider more rasburicase if rises  -Has required platelet transfusions for past 4 days, goal Pl>50  -s/p 1u PRBCs ON  -not a candidate for hydroxyurea or chemo, as per hemeonc    #Endo  FS q4 hr in setting of NPO and hypoglycemic episodes  D5 @50ml/h  Started on D10 @50ml/h 9/30    #GOC  DNR/DNI  No NGT  Daughter is HCP

## 2019-10-02 NOTE — OCCUPATIONAL THERAPY INITIAL EVALUATION ADULT - GENERAL OBSERVATIONS, REHAB EVAL
Patient received semi-supine in bed, +tele, BP cuff, pulse ox, +HD jazmyne, external female catheter, sequentials donned

## 2019-10-02 NOTE — PROGRESS NOTE ADULT - SUBJECTIVE AND OBJECTIVE BOX
CHIEF COMPLAINT:    Interval Events:    REVIEW OF SYSTEMS:  Constitutional: [ ] negative [ ] fevers [ ] chills [ ] weight loss [ ] weight gain  HEENT: [ ] negative [ ] dry eyes [ ] eye irritation [ ] postnasal drip [ ] nasal congestion  CV: [ ] negative  [ ] chest pain [ ] orthopnea [ ] palpitations [ ] murmur  Resp: [ ] negative [ ] cough [ ] shortness of breath [ ] dyspnea [ ] wheezing [ ] sputum [ ] hemoptysis  GI: [ ] negative [ ] nausea [ ] vomiting [ ] diarrhea [ ] constipation [ ] abd pain [ ] dysphagia   : [ ] negative [ ] dysuria [ ] nocturia [ ] hematuria [ ] increased urinary frequency  Musculoskeletal: [ ] negative [ ] back pain [ ] myalgias [ ] arthralgias [ ] fracture  Skin: [ ] negative [ ] rash [ ] itch  Neurological: [ ] negative [ ] headache [ ] dizziness [ ] syncope [ ] weakness [ ] numbness  Psychiatric: [ ] negative [ ] anxiety [ ] depression  Endocrine: [ ] negative [ ] diabetes [ ] thyroid problem  Hematologic/Lymphatic: [ ] negative [ ] anemia [ ] bleeding problem  Allergic/Immunologic: [ ] negative [ ] itchy eyes [ ] nasal discharge [ ] hives [ ] angioedema  [ ] All other systems negative  [ ] Unable to assess ROS because ________    OBJECTIVE:  ICU Vital Signs Last 24 Hrs  T(C): 36.8 (02 Oct 2019 04:00), Max: 37.7 (01 Oct 2019 16:00)  T(F): 98.2 (02 Oct 2019 04:00), Max: 99.8 (01 Oct 2019 16:00)  HR: 133 (02 Oct 2019 07:00) (111 - 144)  BP: 144/83 (02 Oct 2019 07:00) (105/55 - 170/100)  BP(mean): 104 (02 Oct 2019 07:00) (79 - 128)  ABP: --  ABP(mean): --  RR: 30 (02 Oct 2019 07:00) (19 - 35)  SpO2: 100% (02 Oct 2019 07:00) (93% - 100%)        10-01 @ 07:01  -  10-02 @ 07:00  --------------------------------------------------------  IN: 2675 mL / OUT: 3635 mL / NET: -960 mL      CAPILLARY BLOOD GLUCOSE      POCT Blood Glucose.: 147 mg/dL (01 Oct 2019 13:16)      PHYSICAL EXAM:  General:   HEENT:   Lymph Nodes:  Neck:   Respiratory:   Cardiovascular:   Abdomen:   Extremities:   Skin:   Neurological:  Psychiatry:    LINES:    HOSPITAL MEDICATIONS:  Standing Meds:  ALBUTerol/ipratropium for Nebulization 3 milliLiter(s) Nebulizer every 6 hours  calcium gluconate IVPB 2 Gram(s) IV Intermittent every 6 hours  cefTRIAXone   IVPB 1000 milliGRAM(s) IV Intermittent every 24 hours  chlorhexidine 4% Liquid 1 Application(s) Topical <User Schedule>  dexmedetomidine Infusion 0.5 MICROgram(s)/kG/Hr IV Continuous <Continuous>  dextrose 10%. 1000 milliLiter(s) IV Continuous <Continuous>  levETIRAcetam  IVPB 750 milliGRAM(s) IV Intermittent every 12 hours      PRN Meds:  ondansetron Injectable 4 milliGRAM(s) IV Push every 6 hours PRN      LABS:                        7.9    28.25 )-----------( 109      ( 02 Oct 2019 03:59 )             23.8     Hgb Trend: 7.9<--, 8.5<--, 8.7<--, 6.6<--, 7.3<--  10-02    134<L>  |  99  |  11  ----------------------------<  128<H>  4.2   |  20<L>  |  1.06    Ca    10.1      02 Oct 2019 00:14  Phos  3.5     10-02  Mg     2.1     10-02    TPro  6.2  /  Alb  3.5  /  TBili  1.0  /  DBili  x   /  AST  326<H>  /  ALT  237<H>  /  AlkPhos  111  10-02    Creatinine Trend: 1.06<--, 0.80<--, 0.46<--, 0.42<--, 0.47<--, 0.55<--  PT/INR - ( 02 Oct 2019 00:14 )   PT: 13.1 sec;   INR: 1.14 ratio         PTT - ( 02 Oct 2019 00:14 )  PTT:28.0 sec          MICROBIOLOGY:     Culture - Blood (collected 01 Oct 2019 02:13)  Source: .Blood  Preliminary Report (02 Oct 2019 03:01):    No growth to date.    Culture - Blood (collected 01 Oct 2019 02:13)  Source: .Blood  Preliminary Report (02 Oct 2019 03:01):    No growth to date.      RADIOLOGY:  [ ] Reviewed and interpreted by me    EKG: CHIEF COMPLAINT: syncope with b/l SDH, TLS and ADITHYA     Interval Events: Removed 2L via HD yesterday. Platelets repleted. Persistently tachy, received IV Lopressor 5.    REVIEW OF SYSTEMS:  [ ] Unable to assess ROS because pt's mental status.    OBJECTIVE:  ICU Vital Signs Last 24 Hrs  T(C): 36.8 (02 Oct 2019 04:00), Max: 37.7 (01 Oct 2019 16:00)  T(F): 98.2 (02 Oct 2019 04:00), Max: 99.8 (01 Oct 2019 16:00)  HR: 133 (02 Oct 2019 07:00) (111 - 144)  BP: 144/83 (02 Oct 2019 07:00) (105/55 - 170/100)  BP(mean): 104 (02 Oct 2019 07:00) (79 - 128)  ABP: --  ABP(mean): --  RR: 30 (02 Oct 2019 07:00) (19 - 35)  SpO2: 100% (02 Oct 2019 07:00) (93% - 100%)        10-01 @ 07:01  -  10-02 @ 07:00  --------------------------------------------------------  IN: 2675 mL / OUT: 3635 mL / NET: -960 mL      CAPILLARY BLOOD GLUCOSE      POCT Blood Glucose.: 147 mg/dL (01 Oct 2019 13:16)    Const: AAOx0, in NAD  Eyes: PERRL, no conjunctival injection  HENT: NCAT  CV: tachycardic, no obvious murmurs, rubs, or gallops appreciated  Resp: +BL crackles, on BIPAP  GI: Abdomen soft, NTND, no guarding  Extremities: no peripheral edema, 2+ radial and DP pulses  Skin: scattered ecchymosis   MSK: No gross deformities appreciated  Neuro: AAOx0,  not following commands    LINES: PIV    HOSPITAL MEDICATIONS:  Standing Meds:  ALBUTerol/ipratropium for Nebulization 3 milliLiter(s) Nebulizer every 6 hours  calcium gluconate IVPB 2 Gram(s) IV Intermittent every 6 hours  cefTRIAXone   IVPB 1000 milliGRAM(s) IV Intermittent every 24 hours  chlorhexidine 4% Liquid 1 Application(s) Topical <User Schedule>  dexmedetomidine Infusion 0.5 MICROgram(s)/kG/Hr IV Continuous <Continuous>  dextrose 10%. 1000 milliLiter(s) IV Continuous <Continuous>  levETIRAcetam  IVPB 750 milliGRAM(s) IV Intermittent every 12 hours      PRN Meds:  ondansetron Injectable 4 milliGRAM(s) IV Push every 6 hours PRN      LABS:                        7.9    28.25 )-----------( 109      ( 02 Oct 2019 03:59 )             23.8     Hgb Trend: 7.9<--, 8.5<--, 8.7<--, 6.6<--, 7.3<--  10-02    134<L>  |  99  |  11  ----------------------------<  128<H>  4.2   |  20<L>  |  1.06    Ca    10.1      02 Oct 2019 00:14  Phos  3.5     10-02  Mg     2.1     10-02    TPro  6.2  /  Alb  3.5  /  TBili  1.0  /  DBili  x   /  AST  326<H>  /  ALT  237<H>  /  AlkPhos  111  10-02    Creatinine Trend: 1.06<--, 0.80<--, 0.46<--, 0.42<--, 0.47<--, 0.55<--  PT/INR - ( 02 Oct 2019 00:14 )   PT: 13.1 sec;   INR: 1.14 ratio         PTT - ( 02 Oct 2019 00:14 )  PTT:28.0 sec          MICROBIOLOGY:     Culture - Blood (collected 01 Oct 2019 02:13)  Source: .Blood  Preliminary Report (02 Oct 2019 03:01):    No growth to date.    Culture - Blood (collected 01 Oct 2019 02:13)  Source: .Blood  Preliminary Report (02 Oct 2019 03:01):    No growth to date.      RADIOLOGY:  [ ] Reviewed and interpreted by me    EKG:

## 2019-10-02 NOTE — OCCUPATIONAL THERAPY INITIAL EVALUATION ADULT - LIVES WITH, PROFILE
alone/As per chart, pt lives alone in a pvt home with 3 steps to enter, 10 steps inside. (I) ADLs and functional transfers

## 2019-10-02 NOTE — PROGRESS NOTE ADULT - ATTENDING COMMENTS
The patient remains on high flow oxygen by face mask; She is obtunded and her pulmonary examination shows diffuse rales and rhonchi. The patient is not moving her limbs spontaneously. Laboratory review shows improvement in platelet count post transfusion. White cell count is increasing to 24 000 from 12 000. She is not able to take hydroxyurea and family has decided not to have N G Tube placed.   Daughter Sussy with whom I have discussed bone marrow results is considering palliative care and she will discuss with the ICU staff. She is aware of the palliative care unit in [address] [place] [address]. The patient has a DNR status and she has not had significant improvement in respiratory or renal function (she may go back to [facility]); there was urine output last night but her lungs are significantly congested and cardiac ECHO shows a significant decline in ejection fraction.  Bone marrow biopsy (09/27) shows hypercellular marrow with myeloproliferation and myelodysplastic change. Peripheral blood flow cytometry : 5 % blasts.

## 2019-10-02 NOTE — PROGRESS NOTE ADULT - PROBLEM SELECTOR PLAN 1
Most likely hemodynamically mediated with superimposed tubular injury from TLS.   Presented with scr of 2.8 was at 2.7. Phos elevated, Ldh>2000, uric acid elevated, Decreasing UOP. Started on CVVHDF 09/27/19 due to TLS. Pateint TLS improving, wbc coming down, uric acid/k/baldev stable. Discontinued off CRRT yesterday (10/1/2019). Repeat labs today are WNL and patient noted to have 950 mL UO. HemOnc follow up, hydroxyurea to be given. Please continue serial bladder scans, Monitor labs, monitor urine output Obtain renal sono with arterial and venous doppler given hyperviscosity causing renal vein or artery thrombosis.

## 2019-10-02 NOTE — OCCUPATIONAL THERAPY INITIAL EVALUATION ADULT - ADDITIONAL COMMENTS
CT Head 9/26- Stable appearance of the right > left holohemispheric subdural hematomas and falcine subdural hematoma.   CT abdomen pelvis 9/27- Patchy opacities in the right upper and bilateral lower lobes, likely pneumonia. Marked hepatosplenomegaly. Nonspecific attenuation along the splenic periphery, and possible hypodense lesion in the mid to lower spleen, difficult to further characterize without contrast.

## 2019-10-02 NOTE — PROGRESS NOTE ADULT - PROBLEM SELECTOR PLAN 5
Patient with acute respiratory failure secondary to pulmonary edema when discontinued off CRRT. CXR consistent with pulmonary edema. Patient had SCUF performed yesterday with 2L UF. Urine output improved - 950 mL. Currently on BiPAP. Please get repeat CXR. Monitor for respiratory distress.

## 2019-10-02 NOTE — PROGRESS NOTE ADULT - ATTENDING COMMENTS
# Anuric ADITHYA-ATN- TLS- on CRRT, BP improved;  UO has improved; trial of lasix 80mg bid IV  #garrick samano  #acidosis- stable; monitor LAcid  #TLS- hyperphos, hypocalcemia- trend labs

## 2019-10-03 NOTE — CONSULT NOTE ADULT - CONSULT REQUESTED DATE/TIME
03-Oct-2019
26-Sep-2019 19:25
27-Sep-2019
27-Sep-2019 03:14
27-Sep-2019 09:26
27-Sep-2019 02:03
29-Sep-2019 19:37

## 2019-10-03 NOTE — PROGRESS NOTE ADULT - ATTENDING COMMENTS
# Anuric ADITHYA-ATN- TLS- on CRRT, BP improved;  UO has improved; lasix as needed; shiley removed   #acidosis- stable; monitor LAcid  #TLS- hyperphos, hypocalcemia- trend labs  family wants comfort care

## 2019-10-03 NOTE — PROGRESS NOTE ADULT - ATTENDING COMMENTS
Pt is 73 yo WF with h/o MPD on Hydroxyurea and Asa transferred from [place] [address] to [facility] after found unconscious and  with 20% blasts, PLT 40, scr of 2.7. found to have blast crisis with TLS and ADITHYA; pt started on CRRT. During this hospitalization pt with Sz. 10/1 pt with flash pulm edema requiring BiPAP. Pt with deterioration in medical status in the setting of her MPS, GOC discussed with family, pt is DNR/DNI and pt made comfort measure. Palliative care consult with possible transfer to PCU Pt is 73 yo WF with h/o MPD on Hydroxyurea and Asa transferred from [place] [address] to [facility] after found unconscious and  with 20% blasts, PLT 40, scr of 2.7. found to have blast crisis with TLS and ADITHYA; pt started on CRRT. During this hospitalization pt with Sz. 10/1 pt with flash pulm edema requiring BiPAP. Pt with deterioration in medical status in the setting of her MPS, GOC discussed with family, pt is DNR/DNI and pt made comfort measure. Palliative care consult with possible transfer to PCU. Family at the bedside

## 2019-10-03 NOTE — GOALS OF CARE CONVERSATION - ADVANCED CARE PLANNING - CONVERSATION/DISCUSSION
Prognosis/MOLST Discussed
Treatment Options/Diagnosis/Prognosis/Palliative Care Referral/Hospice Referral

## 2019-10-03 NOTE — PROGRESS NOTE ADULT - SUBJECTIVE AND OBJECTIVE BOX
INTERVAL HPI/OVERNIGHT EVENTS:  Patient S&E at bedside. Lethargic, minimally answering questions.  Following commands but very weak.      VITAL SIGNS:  T(F): 98.1 (10-03-19 @ 08:00)  HR: 118 (10-03-19 @ 11:29)  BP: 113/63 (10-03-19 @ 09:00)  RR: 21 (10-03-19 @ 09:00)  SpO2: 100% (10-03-19 @ 11:29)  Wt(kg): --    PHYSICAL EXAM:  Constitutional: NAD  Eyes: EOMI, sclera non-icteric  Neck: supple, no masses, no JVD  Respiratory: CTA b/l, good air entry b/l  Cardiovascular: RRR, no M/R/G  Gastrointestinal: soft, NTND, no masses palpable, + BS  Extremities: no c/c/e  Neurological: AAOx3    MEDICATIONS  (STANDING):  ALBUTerol/ipratropium for Nebulization 3 milliLiter(s) Nebulizer every 6 hours  chlorhexidine 4% Liquid 1 Application(s) Topical <User Schedule>  dextrose 10%. 1000 milliLiter(s) (50 mL/Hr) IV Continuous <Continuous>  levETIRAcetam  IVPB 750 milliGRAM(s) IV Intermittent every 12 hours    MEDICATIONS  (PRN):  HYDROmorphone  Injectable 0.2 milliGRAM(s) IV Push every 2 hours PRN dyspnea  HYDROmorphone  Injectable 0.2 milliGRAM(s) IV Push every 2 hours PRN mod-severe pain  LORazepam   Injectable 2 milliGRAM(s) IntraMuscular every 6 hours PRN Anxiety/respiratory distress  ondansetron Injectable 4 milliGRAM(s) IV Push every 6 hours PRN Nausea    Allergies  Alcohol (Unknown)  No Known Drug Allergies  shellfish (Unknown)    Intolerances        LABS:                        8.2    38.76 )-----------( 49       ( 03 Oct 2019 00:00 )             24.1     10-03    134<L>  |  96  |  24<H>  ----------------------------<  139<H>  2.9<LL>   |  23  |  1.51<H>    Ca    9.6      03 Oct 2019 00:00  Phos  1.7     10-03  Mg     1.4     10-03    TPro  6.4  /  Alb  3.5  /  TBili  1.0  /  DBili  x   /  AST  168<H>  /  ALT  166<H>  /  AlkPhos  111  10-03    PT/INR - ( 03 Oct 2019 00:00 )   PT: 14.0 sec;   INR: 1.21 ratio         PTT - ( 03 Oct 2019 00:00 )  PTT:29.9 sec      RADIOLOGY & ADDITIONAL TESTS:  Studies reviewed.    ASSESSMENT & PLAN: INTERVAL HPI/OVERNIGHT EVENTS:  Patient S&E at bedside. Lethargic, minimally answering questions.  Following commands but very weak.      VITAL SIGNS:  T(F): 98.1 (10-03-19 @ 08:00)  HR: 118 (10-03-19 @ 11:29)  BP: 113/63 (10-03-19 @ 09:00)  RR: 21 (10-03-19 @ 09:00)  SpO2: 100% (10-03-19 @ 11:29)  Wt(kg): --    PHYSICAL EXAM:  Constitutional: weak  Eyes: EOMI, sclera non-icteric  Neck: supple, no masses, no JVD  Respiratory: CTA b/l, good air entry b/l  Cardiovascular: RRR, no M/R/G  Gastrointestinal: soft, NTND, no masses palpable, + BS  Extremities: no c/c/e  Neurological: AAO unable  Skin: ecchymoses on extremities and chest    MEDICATIONS  (STANDING):  ALBUTerol/ipratropium for Nebulization 3 milliLiter(s) Nebulizer every 6 hours  chlorhexidine 4% Liquid 1 Application(s) Topical <User Schedule>  dextrose 10%. 1000 milliLiter(s) (50 mL/Hr) IV Continuous <Continuous>  levETIRAcetam  IVPB 750 milliGRAM(s) IV Intermittent every 12 hours    MEDICATIONS  (PRN):  HYDROmorphone  Injectable 0.2 milliGRAM(s) IV Push every 2 hours PRN dyspnea  HYDROmorphone  Injectable 0.2 milliGRAM(s) IV Push every 2 hours PRN mod-severe pain  LORazepam   Injectable 2 milliGRAM(s) IntraMuscular every 6 hours PRN Anxiety/respiratory distress  ondansetron Injectable 4 milliGRAM(s) IV Push every 6 hours PRN Nausea    Allergies  Alcohol (Unknown)  No Known Drug Allergies  shellfish (Unknown)    Intolerances        LABS:                        8.2    38.76 )-----------( 49       ( 03 Oct 2019 00:00 )             24.1     10-03    134<L>  |  96  |  24<H>  ----------------------------<  139<H>  2.9<LL>   |  23  |  1.51<H>    Ca    9.6      03 Oct 2019 00:00  Phos  1.7     10-03  Mg     1.4     10-03    TPro  6.4  /  Alb  3.5  /  TBili  1.0  /  DBili  x   /  AST  168<H>  /  ALT  166<H>  /  AlkPhos  111  10-03    PT/INR - ( 03 Oct 2019 00:00 )   PT: 14.0 sec;   INR: 1.21 ratio         PTT - ( 03 Oct 2019 00:00 )  PTT:29.9 sec      RADIOLOGY & ADDITIONAL TESTS:  Studies reviewed.    ASSESSMENT & PLAN:

## 2019-10-03 NOTE — PROGRESS NOTE ADULT - ASSESSMENT
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    #NEURO  [place] MS - A&Ox0 today  BL subdural hematomas  -no plan for surgical intervention  -repeat CTH stable  -seizure on 9/27 --> keppra 750 bid, s/p ativan x1  - EEG 09-29-19 14:20 to 9-30-19 08:00: No seizures recorded. Potential seizure foci in the left frontal, left parieto-occipital, and right frontal regions. Moderate diffuse or multifocal cerebral dysfunction, not specific as to etiology.  - c/w Keppra BID    #RESP  Hypoxic respiratory failure on 4-5 L NC  No history of lung disease, but possible leukostasis from blast crisis?  Pt DNR/DNI, will not do aggressive measures at this time   -pulmonary edema ON --> s/p BiPAP and lasix  -Off BIPAP, on NC  -Ativan PRN for dyspnea    #CV  BP stable  s/p levo for hypotension  TTE: mild AR, mild-mod TR, EF WNL  off pressors  persistently tachy ON - s/p IV Lopressor. Will consider starting on Metoprolol  POCUS showing decreased systolic function  Repeat TTE results pending    #Renal:   ADITHYA: most likely hemodynamically mediated with superimposed tubular injury from TLS  -Monitor uric acid, phos, K  Anuric  -Dc'd edward  Hyperphosphatemia: Monitor. If continues to drop, consider switching to Phoxylum.  Acidemia: Stable on CRRT  Hypocalcemia: Monitor Ca, keep ionized Ca>1. Ca gluconate 2 g q6-8h.  Received IHD 10/1  Family requests no more HD, HD removed; comfort measures only    #ID  Febrile with blast crisis   Given immunocompromised state, covered broadly with vanc/gio  -Legionella negative. Azithro dc'd  -UCx E.coli>100,000  -CT: PNA  -Vanc dc'd. Meropenem dc'd  -Switched to Rocephin 10/1 (E.coli is sensitive)    #GI  As per daughter no NGT   D10 @ 50ml/h    #Heme  -BM Bx ruled out Leukemia; shows hypercellular marrow with myeloproliferation and myelodysplastic change. Peripheral blood flow cytometry : 5 % blasts.  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    #NEURO  [place] MS - A&Ox0 today  BL subdural hematomas  -no plan for surgical intervention  -repeat CTH stable  -seizure on 9/27 --> keppra 750 bid, s/p ativan x1  - EEG 09-29-19 14:20 to 9-30-19 08:00: No seizures recorded. Potential seizure foci in the left frontal, left parieto-occipital, and right frontal regions. Moderate diffuse or multifocal cerebral dysfunction, not specific as to etiology.  - c/w Keppra BID    #RESP  Hypoxic respiratory failure on 4-5 L NC  No history of lung disease, but possible leukostasis from blast crisis?  Pt DNR/DNI, will not do aggressive measures at this time   -pulmonary edema ON --> s/p BiPAP and lasix  -Off BIPAP, on NC  -Ativan PRN for dyspnea    #CV  BP stable  s/p levo for hypotension  TTE: mild AR, mild-mod TR, EF WNL  off pressors  persistently tachy ON - s/p IV Lopressor. Will consider starting on Metoprolol  POCUS showing decreased systolic function  Repeat TTE results pending    #Renal:   ADITHYA: most likely hemodynamically mediated with superimposed tubular injury from TLS  -Monitor uric acid, phos, K  Anuric  -Dc'd edward  Hyperphosphatemia: Monitor. If continues to drop, consider switching to Phoxylum.  Acidemia: Stable on CRRT  Hypocalcemia: Monitor Ca, keep ionized Ca>1. Ca gluconate 2 g q6-8h.  Received IHD 10/1  Family requests no more HD, HD removed; comfort measures only    #ID  Febrile with blast crisis   Given immunocompromised state, covered broadly with vanc/gio  -Legionella negative. Azithro dc'd  -UCx E.coli>100,000  -CT: PNA  -Vanc dc'd. Meropenem dc'd  -Switched to Rocephin 10/1 (E.coli is sensitive) Day 3 (7 days abx total)    #GI  As per daughter no NGT   D10 @ 50ml/h    #Heme  -BM Bx ruled out Leukemia; shows hypercellular marrow with myeloproliferation and myelodysplastic change. Peripheral blood flow cytometry : 5 % blasts.  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    #NEURO  [place] MS - A&Ox0 today  BL subdural hematomas  -no plan for surgical intervention  -repeat CTH stable  -seizure on 9/27 --> keppra 750 bid, s/p ativan x1  - EEG 09-29-19 14:20 to 9-30-19 08:00: No seizures recorded. Potential seizure foci in the left frontal, left parieto-occipital, and right frontal regions. Moderate diffuse or multifocal cerebral dysfunction, not specific as to etiology.  - c/w Keppra BID    #RESP  Hypoxic respiratory failure on 4-5 L NC  No history of lung disease, but possible leukostasis from blast crisis?  Pt DNR/DNI, will not do aggressive measures at this time   -pulmonary edema ON --> s/p BiPAP and lasix  -Off BIPAP, on NC  -Morphine PRN for dyspnea    #CV  BP stable  s/p levo for hypotension  TTE: mild AR, mild-mod TR, EF WNL  off pressors  persistently tachy ON - s/p IV Lopressor. Will consider starting on Metoprolol  POCUS showing decreased systolic function  Repeat TTE results pending    #Renal:   ADITHYA: most likely hemodynamically mediated with superimposed tubular injury from TLS  -Monitor uric acid, phos, K  Anuric  -Dc'd edward  Hyperphosphatemia: Monitor. If continues to drop, consider switching to Phoxylum.  Acidemia: Stable on CRRT  Hypocalcemia: Monitor Ca, keep ionized Ca>1. Ca gluconate 2 g q6-8h.  Received IHD 10/1  Family requests no more HD, HD removed; comfort measures only    #ID  Febrile with blast crisis   Given immunocompromised state, covered broadly with vanc/gio  -Legionella negative. Azithro dc'd  -UCx E.coli>100,000  -CT: PNA  -Vanc dc'd. Meropenem dc'd  -Switched to Rocephin 10/1 (E.coli is sensitive) Day 3 (7 days abx total)    #GI  As per daughter no NGT   D10 @ 50ml/h    #Heme  -BM Bx ruled out Leukemia; shows hypercellular marrow with myeloproliferation and myelodysplastic change. Peripheral blood flow cytometry : 5 % blasts.  Goal Hb>7, Pl>10 (>20 if febrile, >50 before procedures)  -Not a candidate for hydroxyurea or chemo  -Palliative measures only    #Endo  D10 @50ml/h     #GOC  DNR/DNI  No NGT  Comfort measures only  Daughter is HCP

## 2019-10-03 NOTE — CONSULT NOTE ADULT - PROBLEM SELECTOR RECOMMENDATION 9
- in setting of renal failure, will switch from morphine to dilaudid 0.2mg IV Q2 PRN for pain or dyspnea  - monitor symptoms  - BiPAP and lasix as needed, but DNI
Most likely hemodynamically mediated with superimposed tubular injury from TLS.   Presented with scr of 2.8 now 2.7. On IV fluids at 100cc/hr.   Phos elevated, Ldh>2000, uric acid elevated, Decreasing UOP. Case discussed with family, about the need of RRT to prevent further Tumor lysis burden to kidney, agreed for temporary RRT but would not like long term HD, therefore MICU consulted at bed side, decided to transfer to [facility] with high clearance.   Will keep pt even given underlying subdural hematoma.   Agree with rasburicase, monitor Uric acid, phos, and K.   Trend TLS labs every 6 hours.   HemOnc follow up to clarify role for leukopheresis.   avoid nephrotoxic meds, adjust meds based on CRRT.  Continue with leon to monitor UOP and renal recovery.    Obtain renal sone with arterial and venous doppler given hyperviscosity causing renal vein or artery thrombosis.

## 2019-10-03 NOTE — CONSULT NOTE ADULT - SUBJECTIVE AND OBJECTIVE BOX
HPI: 74F with PMH of MDS here from [facility] with bilateral SDH, with concern for MDS to acute leukemia transformation and tumor lysis syndrome, no longer a chemo candidate. Has had waxing mental status; no plans for surgical intervention of SDH. Course c/b seizures on 9/27, now on keppra, with EEG showing potential foci in L frontal, L parieto-occipital, and R frontal regions. Had pulmonary edema overnight, requiring BiPAP and lasix, and has morphine for dyspnea. Currently off pressors. Also had HD on 10/1 for TLS, family now decided on no further HD. Was also on broad-spectrum abx for fever. Palliative called for GOC and possible transition to PCU care.    PERTINENT PM/SXH:   Spleen enlarged  MDS (myelodysplastic syndrome)    No significant past surgical history    FAMILY HISTORY:  Family history of CVA: mother    ITEMS NOT CHECKED ARE NOT PRESENT    SOCIAL HISTORY:   Significant other/partner:  [ ]    Children:  [ X]    Oriental orthodox/Spirituality:  Substance hx:  [ ]   Tobacco hx:  [ ]   Alcohol hx: [ ] Drug use hx: [ ]  Home Opioid hx:  [ ] (I-Stop Reference No: [id number])    Rx Written	Rx Dispensed	Drug	Quantity	Days Supply	Prescriber Name  09/12/2019	09/20/2019	diazepam 5 mg tablet	30	30	[facility], [place] [facility]  06/12/2019	06/15/2019	diazepam 5 mg tablet	30	30	[facility] [facility]  04/12/2019	04/29/2019	diazepam 5 mg tablet	30	30	[facility], [place] [facility]  01/30/2019	02/07/2019	diazepam 5 mg tablet	30	30	[facility], [place] [facility]    Living Situation: [ ]Home  [ ]Long term care  [ ]Rehab [ ]Other  Occupation:    ADVANCE DIRECTIVES:    DNR [X ]  MOLST  [X ]    Living Will  [ ]   DECISION MAKER(s):  [X ] Health Care Proxy(s)  [ ] Surrogate(s)  [ ] Guardian           Name(s) and Contact(s): Sussy Ny MD (daughter - [phone])    BASELINE (I)ADL(s) (prior to admission):  [place]: [ ]Total  [ ] Moderate [ ]Dependent    Allergies    Alcohol (Unknown)  No Known Drug Allergies  shellfish (Unknown)    Intolerances    MEDICATIONS  (STANDING):  ALBUTerol/ipratropium for Nebulization 3 milliLiter(s) Nebulizer every 6 hours  chlorhexidine 4% Liquid 1 Application(s) Topical <User Schedule>  dextrose 10%. 1000 milliLiter(s) (50 mL/Hr) IV Continuous <Continuous>  levETIRAcetam  IVPB 750 milliGRAM(s) IV Intermittent every 12 hours    MEDICATIONS  (PRN):  LORazepam   Injectable 2 milliGRAM(s) IntraMuscular every 6 hours PRN Anxiety/respiratory distress  morphine  - Injectable 1 milliGRAM(s) IV Push every 4 hours PRN Moderate Pain (4 - 6)  ondansetron Injectable 4 milliGRAM(s) IV Push every 6 hours PRN Nausea    PRESENT SYMPTOMS:   Source if other than patient:  [ ]Family   [ ]Team     Pain (Impact on QOL):    Location -         Minimal acceptable level (0-10 scale):                    Aggravating factors -  Quality -  Radiation -  Severity (0-10 scale) -    Timing -    PAIN AD Score:     [email or web address] (press ctrl +  left click to view)    Dyspnea:                           [ ]Mild [ ]Moderate [ ]Severe  Anxiety:                             [ ]Mild [ ]Moderate [ ]Severe  Fatigue:                             [ ]Mild [ ]Moderate [ ]Severe  Nausea:                             [ ]Mild [ ]Moderate [ ]Severe  Loss of appetite:              [ ]Mild [ ]Moderate [ ]Severe  Constipation:                    [ ]Mild [ ]Moderate [ ]Severe    Other Symptoms:  [ ]All other review of systems negative   [ X]Unable to obtain due to poor mentation     Karnofsky Performance Score/Palliative Performance Status Version 2:   <30%    PHYSICAL EXAM:  Vital Signs Last 24 Hrs  T(C): 36.7 (03 Oct 2019 08:00), Max: 37.8 (02 Oct 2019 20:00)  T(F): 98.1 (03 Oct 2019 08:00), Max: 100 (02 Oct 2019 20:00)  HR: 118 (03 Oct 2019 11:29) (112 - 135)  BP: 113/63 (03 Oct 2019 09:00) (95/53 - 151/72)  BP(mean): 82 (03 Oct 2019 09:00) (67 - 103)  RR: 21 (03 Oct 2019 09:00) (20 - 29)  SpO2: 100% (03 Oct 2019 11:29) (97% - 100%) I&O's Summary    02 Oct 2019 07:01  -  03 Oct 2019 07:00  --------------------------------------------------------  IN: 1750 mL / OUT: 1900 mL / NET: -150 mL    03 Oct 2019 07:01  -  03 Oct 2019 15:43  --------------------------------------------------------  IN: 50 mL / OUT: 170 mL / NET: -120 mL        GENERAL:  [ ]Alert  [ ]Oriented x   [X ]Lethargic  [ ]Cachexia  [ ]Unarousable  [ ]Verbal  [X ]Non-Verbal    Behavioral:   [ ] Anxiety  [ ] Delirium [ ] Agitation [ ] Other    HEENT:  [ ]Normal   [ ]Dry mouth   [ ]ET Tube/Trach  [ ]Oral lesions    PULMONARY:   [X ]Clear [ ]Tachypnea  [ ]Audible excessive secretions   [ ]Rhonchi        [ ]Right [ ]Left [ ]Bilateral  [ ]Crackles        [ ]Right [ ]Left [ ]Bilateral  [ ]Wheezing     [ ]Right [ ]Left [ ]Bilateral    CARDIOVASCULAR:    [X ]Regular [ ]Irregular [ ]Tachy  [ ]Umesh [ ]Murmur [ ]Other    GASTROINTESTINAL:  [X ]Soft  [ ]Distended   [X ]+BS  [X ]Non tender [ ]Tender  [ ]PEG [ ]OGT/ NGT  Last BM:     GENITOURINARY:  [ ]Normal [ ] Incontinent   [ ]Oliguria/Anuria   [ ]Aj    MUSCULOSKELETAL:   [ ]Normal   [ ]Weakness  [X ]Bed/Wheelchair bound [ ]Edema    NEUROLOGIC:   [ ]No focal deficits  [X ] Cognitive impairment  [ ] Dysphagia [ ]Dysarthria [ ] Paresis [ ]Other     SKIN:   [ ]Normal   [ ]Pressure ulcer(s)  [ ]Rash    CRITICAL CARE:  [ ] Shock Present  [ ]Septic [ ]Cardiogenic [ ]Neurologic [ ]Hypovolemic  [ ]  Vasopressors [ ]  Inotropes   [ ] Respiratory failure present  [ ] Acute  [ ] Chronic [ ] Hypoxic  [ ] Hypercarbic [ ] Other  [ ] Other organ failure     LABS: reviewed                        8.2    38.76 )-----------( 49       ( 03 Oct 2019 00:00 )             24.1   10-03    134<L>  |  96  |  24<H>  ----------------------------<  139<H>  2.9<LL>   |  23  |  1.51<H>    Ca    9.6      03 Oct 2019 00:00  Phos  1.7     10-03  Mg     1.4     10-03    TPro  6.4  /  Alb  3.5  /  TBili  1.0  /  DBili  x   /  AST  168<H>  /  ALT  166<H>  /  AlkPhos  111  10-03  PT/INR - ( 03 Oct 2019 00:00 )   PT: 14.0 sec;   INR: 1.21 ratio         PTT - ( 03 Oct 2019 00:00 )  PTT:29.9 sec      RADIOLOGY & ADDITIONAL STUDIES:    CXR 10/2/19: Interval decrease in bilateral pleural effusions.     PROTEIN CALORIE MALNUTRITION:   [ ] PPSV2 < or = to 30% [ ] significant weight loss  [ ] poor nutritional intake [ ] catabolic state [ ] anasarca     Albumin, Serum: 3.5 g/dL (10-03-19 @ 00:00)  Artificial Nutrition [ ]     REFERRALS:   [ ]Chaplaincy  [ ] Hospice  [ ]Child Life  [ ]Social Work  [ ]Case management [ ]Holistic Therapy     Goals of Care Discussion Document:     ........ ....... ...... ..... .... ... .. .  Darin Rogers MD  Palliative Medicine  office: [phone] | [phone]  pager: [phone]

## 2019-10-03 NOTE — CONSULT NOTE ADULT - ASSESSMENT
74F with PMH of MDS here from [facility] with bilateral SDH, with concern for MDS to acute leukemia transformation and tumor lysis syndrome, no longer a chemo candidate. Has had waxing mental status; no plans for surgical intervention of SDH. Course c/b seizures on 9/27, now on keppra, with EEG showing potential foci in L frontal, L parieto-occipital, and R frontal regions. Had pulmonary edema overnight, requiring BiPAP and lasix, and has morphine for dyspnea. Currently off pressors. Also had HD on 10/1 for TLS, family now decided on no further HD. Was also on broad-spectrum abx for fever. Palliative called for GOC and possible transition to PCU care.

## 2019-10-03 NOTE — CONSULT NOTE ADULT - PROBLEM SELECTOR RECOMMENDATION 3
- no surgical intervention
In the setting of Lactic acidosis, on abx, r/o infectious process.  To be started on CRRT. Would avoid bicarb drip, given high risk of calcium phosphate deposition worsening tubular injury.

## 2019-10-03 NOTE — GOALS OF CARE CONVERSATION - ADVANCED CARE PLANNING - TREATMENT GUIDELINES
DNI, no HD, no pressors/DNR Order/No blood draws/Comfort measures only/IV fluid trial
DNR Order/Comfort measures only

## 2019-10-03 NOTE — PROGRESS NOTE ADULT - ASSESSMENT
75 y/o woman with myelodysplastic syndrome which progressed to acute leukemia and tumor lysis syndrome requiring CVVHD for acute kidney failure, bilateral SDH following syncope with fall complicated by seizures, anemia. Pt status has been steadily declining, and per heme she would not be able to handle further treatment with chemo. Pts mental status was also declining. After discussion with daughter, Palliative care brought in , HD removed and patient made comfort care.   I agree that this was the best course of action for this patient as her prognosis was becoming poorer.     - Aeds can be continued to prevent further seizure which may add to her discomfort. May be lowered if she is overly sedated.   - Comfort care as per Palliative Team and families wishes.   - Will sign off, call back with any questions.

## 2019-10-03 NOTE — CONSULT NOTE ADULT - PROBLEM SELECTOR RECOMMENDATION 4
- linked to GOC note  - 16 minutes spent on discussion including ACP
In the setting of TLS  Monitor Ca, keep Ionized calcium >1. Calcium gluconate 2 g Q6-8 hous

## 2019-10-03 NOTE — PROGRESS NOTE ADULT - NSHPATTENDINGPLANDISCUSS_GEN_ALL_CORE
Her daughters and MICU staff
patient 2 daughters; also aunt and ucle present at bed side; ICU staff
Dr Deluca and 2 daughters of patient
neurology team
Dr Mallory
Dr Sidhu, [facility] Day[facility], Daughter Sussy by telephone [phone]
ICU staff, Dr Parks
patient daughter, male relative (uncle of daughter), ICU staff, Dr Day,Dr Sidhu
daughters at bedside
daughters at bedside
micu, daughter
micu
micu,
micu,

## 2019-10-03 NOTE — PROGRESS NOTE ADULT - ASSESSMENT
74 year old female with history of MPD on HU and ASA followed by Dr. Edwina Moon ([phone]) was brought to [place] ED by EMS after she had syncope and was found to have SDH for which she was transferred to [facility] for further care. At ED, her WBC was 238 with 20% blasts and hematology was consulted for possible leukemia.     #Polycythemia Vera/MPD  -Bone marrow biopsy result shows; clinical and morphological evidence of overlapping myeloproliferative and myelodysplastic features. Leukemia is ruled out.  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  -Hold hydroxyurea as patient is thrombocytopenic and leukocytosis is improved.  -Transfuse as needed. Monitor CBC with differential daily and transfuse to maintain Hb >7, and platelet count >10, >20 if febrile, and >50 prior to procedures.  -Patient is not a candidate for chemo treatment at this time.   -Would more benefit from palliative care at this point, need to discuss with family about further plans. Would appreciate input from palliative care team.    # New SDH, most likely traumatic  -1cm on R and 3mm on L. no midline shift  -Plt transfusion PRN  -Neurosurgery is following, appreciated their input.    # ADITHYA vs CKD vs ADITHYA on CKD   -Cr normalized from 2.7 on admission, patient weaned off from CVVD however Cr is going up again.  -Management per ICU team and nephrology team. 74 year old female with history of JAK2+ PV dx'd 10yrs ago on HU and ASA followed by Dr. Edwina Moon ([phone]) was brought to [place] ED by EMS after she had syncope and was found to have SDH for which she was transferred to [facility] for further care. At ED, her WBC was 238 with 20% blasts with BM showing post-PV MPN/MDS w 5% myeloblasts.    #Post polycythemia Vera MPD/MPN d/o, c/b SDH, renal failure, TLS  -wbc trended down while on CVVHD but now wbc trending back up, wbc 38 today. She has been off Hydrea due to inability to take PO. Today pt had minimal response during interview, was lethargic and very weak appearing. Patient is not a candidate for chemo treatment at this time. Appreciate palliative care.  Daughter has decided for comfort care and is awaiting transfer to PCU.  Agree with comfort measures of limited blood draws, limited VS, no HD, lasix and BIPAP for comfort.  -please call us with any questions.    Grace Sidhu  Hematology Fellow  [phone]

## 2019-10-03 NOTE — CHART NOTE - NSCHARTNOTEFT_GEN_A_CORE
MICU Transfer Note    Transfer from: MICU    Transfer to: (x) Medicine    (  ) Telemetry     (   ) RCU        (    ) Palliative         (   ) Stroke Unit          (   ) __________________    Accepting Physician: Dr. Pelaez  Signout given to: Dr. 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 Upon arrival to the ED, pt's mental status was waxing and waning, baseline AAOx4. ED course was c/b T 101, hypoxemia to 83%, tachycardia to 122, tachypnea to 30s and temporarily placed on bipap.     While in the MICU, pt received CVVHD for tumor lysis syndrome. TLS labs were followed q6 hours with improvement.           ASSESSMENT & PLAN:             FOR FOLLOW UP: MICU Transfer Note    Transfer from: MICU    Transfer to: (x) Medicine    (  ) Telemetry     (   ) RCU        (    ) Palliative         (   ) Stroke Unit          (   ) __________________    Accepting Physician: Dr. Pelaez  Signout given to: Dr. 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 Upon arrival to the ED, pt's mental status was waxing and waning, baseline AAOx4. ED course was c/b T 101, hypoxemia to 83%, tachycardia to 122, tachypnea to 30s and temporarily placed on bipap.     While in the MICU, pt was started on BIPAP for respiratory distress and CVVHD 9/27/19 for tumor lysis syndrome. TLS labs were followed q6 hours with improvement. CVVHD was discontinued on 10/1/19, and pt was transitioned to NC. A few hours after discontinuation of CRRT, pt went into flash pulmonary edema and received HD, during which 2L were removed. During this time, pt's mental status declined A&Ox0, and pt was also placed back on BiPAP.     Pt had 1 episode of seizure in the MICU, and was started on Keppra BID. vEEG the following day showed no seizures. Neuro recommended continuing Keppra BID.    Bone marrow biopsy and aspiration was performed and showed hypercellular marrow with myeloproliferation and myelodysplastic change. Peripheral blood flow cytometry : 5 % blasts. Heme/onc discussed with family that pt was not a candidate for hydroxyurea or chemo. Pt's 2 daughters spoke to the palliative team and agreed that at this time, their mother would be best served by comfort care.       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    #NEURO  [place] MS - A&Ox0 today  BL subdural hematomas  -no plan for surgical intervention  -repeat CTH stable  -seizure on 9/27 --> keppra 750 bid, s/p ativan x1  - EEG 09-29-19 14:20 to 9-30-19 08:00: No seizures recorded. Potential seizure foci in the left frontal, left parieto-occipital, and right frontal regions. Moderate diffuse or multifocal cerebral dysfunction, not specific as to etiology.  - c/w Keppra BID    #RESP  Hypoxic respiratory failure on 4-5 L NC  No history of lung disease, but possible leukostasis from blast crisis?  Pt DNR/DNI, will not do aggressive measures at this time   -pulmonary edema ON --> s/p BiPAP and lasix  -Off BIPAP, on NC  -Morphine PRN for dyspnea    #CV  BP stable  s/p levo for hypotension  TTE: mild AR, mild-mod TR, EF WNL  off pressors  persistently tachy ON - s/p IV Lopressor. Will consider starting on Metoprolol  POCUS showing decreased systolic function  Repeat TTE results pending    #Renal:   ADITHYA: most likely hemodynamically mediated with superimposed tubular injury from TLS  -Monitor uric acid, phos, K  Anuric  -Dc'd leon  Hyperphosphatemia: Monitor. If continues to drop, consider switching to Phoxylum.  Acidemia: Stable on CRRT  Hypocalcemia: Monitor Ca, keep ionized Ca>1. Ca gluconate 2 g q6-8h.  Received IHD 10/1  Family requests no more HD, HD removed; comfort measures only    #ID  Febrile with blast crisis   Given immunocompromised state, covered broadly with vanc/gio  -Legionella negative. Azithro dc'd  -UCx E.coli>100,000  -CT: PNA  -Vanc dc'd. Meropenem dc'd  -Switched to Rocephin 10/1 (E.coli is sensitive) Day 3. 7 days abx total completed.    #GI  As per daughter no NGT   D10 @ 50ml/h    #Heme  -BM Bx ruled out Leukemia; shows hypercellular marrow with myeloproliferation and myelodysplastic change. Peripheral blood flow cytometry : 5 % blasts.  Goal Hb>7, Pl>10 (>20 if febrile, >50 before procedures)  -Not a candidate for hydroxyurea or chemo  -Palliative measures only    #Endo  D10 @50ml/h     #GOC  DNR/DNI  No NGT  Comfort measures only  Daughter is HCP      FOR FOLLOW UP:  1. Comfort measures only MICU Transfer Note    Transfer from: MICU    Transfer to: (x) Medicine    (  ) Telemetry     (   ) RCU        (    ) Palliative         (   ) Stroke Unit          (   ) __________________    Accepting Physician: Dr. Pelaez  Signout given to: Dr. 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 Upon arrival to the ED, pt's mental status was waxing and waning, baseline AAOx4. ED course was c/b T 101, hypoxemia to 83%, tachycardia to 122, tachypnea to 30s and temporarily placed on bipap.     While in the MICU, pt was started on BIPAP for respiratory distress and CVVHD 9/27/19 for tumor lysis syndrome. TLS labs were followed q6 hours with improvement. CVVHD was discontinued on 10/1/19, and pt was transitioned to NC. A few hours after discontinuation of CRRT, pt went into flash pulmonary edema and received HD, during which 2L were removed. During this time, pt's mental status declined A&Ox0, and pt was also placed back on BiPAP.     Pt had 1 episode of seizure in the MICU, and was started on Keppra BID. vEEG the following day showed no seizures. Neuro recommended continuing Keppra BID.    Pt received multiple units of PRBCs and platelets. Bone marrow biopsy and aspiration was performed and showed hypercellular marrow with myeloproliferation and myelodysplastic change. Peripheral blood flow cytometry : 5 % blasts. Heme/onc discussed with family that pt was not a candidate for hydroxyurea or chemo. Pt's 2 daughters spoke to the palliative team and agreed that at this time, their mother would be best served by comfort care.       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    #NEURO  [place] MS - A&Ox0 today  BL subdural hematomas  -no plan for surgical intervention  -repeat CTH stable  -seizure on 9/27 --> keppra 750 bid, s/p ativan x1  - EEG 09-29-19 14:20 to 9-30-19 08:00: No seizures recorded. Potential seizure foci in the left frontal, left parieto-occipital, and right frontal regions. Moderate diffuse or multifocal cerebral dysfunction, not specific as to etiology.  - c/w Keppra BID    #RESP  Hypoxic respiratory failure on 4-5 L NC  No history of lung disease, but possible leukostasis from blast crisis?  Pt DNR/DNI, will not do aggressive measures at this time   -pulmonary edema ON --> s/p BiPAP and lasix  -Off BIPAP, on NC  -Morphine PRN for dyspnea    #CV  BP stable  s/p levo for hypotension  TTE: mild AR, mild-mod TR, EF WNL  off pressors  persistently tachy ON - s/p IV Lopressor. Will consider starting on Metoprolol  POCUS showing decreased systolic function  Repeat TTE results pending    #Renal:   ADITHYA: most likely hemodynamically mediated with superimposed tubular injury from TLS  -Monitor uric acid, phos, K  Anuric  -Dc'd leon  Hyperphosphatemia: Monitor. If continues to drop, consider switching to Phoxylum.  Acidemia: Stable on CRRT  Hypocalcemia: Monitor Ca, keep ionized Ca>1. Ca gluconate 2 g q6-8h.  Received IHD 10/1  Family requests no more HD, HD removed; comfort measures only    #ID  Febrile with blast crisis   Given immunocompromised state, covered broadly with vanc/gio  -Legionella negative. Azithro dc'd  -UCx E.coli>100,000  -CT: PNA  -Vanc dc'd. Meropenem dc'd  -Switched to Rocephin 10/1 (E.coli is sensitive) Day 3. 7 days abx total completed.    #GI  As per daughter no NGT   D10 @ 50ml/h    #Heme  -BM Bx ruled out Leukemia; shows hypercellular marrow with myeloproliferation and myelodysplastic change. Peripheral blood flow cytometry : 5 % blasts.  Goal Hb>7, Pl>10 (>20 if febrile, >50 before procedures)  -Not a candidate for hydroxyurea or chemo  -Palliative measures only    #Endo  D10 @50ml/h     #GOC  DNR/DNI  No NGT  Comfort measures only  Daughter is HCP      FOR FOLLOW UP:  1. Comfort measures only

## 2019-10-03 NOTE — PROGRESS NOTE ADULT - SUBJECTIVE AND OBJECTIVE BOX
CHIEF COMPLAINT:    Interval Events: syncope with BL SDH, TLS, and ADITHYA    REVIEW OF SYSTEMS:  [x] Unable to assess ROS because pt's mental status    OBJECTIVE:  ICU Vital Signs Last 24 Hrs  T(C): 36.8 (03 Oct 2019 04:00), Max: 37.8 (02 Oct 2019 20:00)  T(F): 98.2 (03 Oct 2019 04:00), Max: 100 (02 Oct 2019 20:00)  HR: 113 (03 Oct 2019 05:00) (113 - 139)  BP: 95/53 (03 Oct 2019 05:00) (95/53 - 151/72)  BP(mean): 67 (03 Oct 2019 05:00) (67 - 109)  ABP: --  ABP(mean): --  RR: 21 (03 Oct 2019 05:00) (21 - 35)  SpO2: 100% (03 Oct 2019 05:00) (97% - 100%)        10-01 @ 07:01  -  10-02 @ 07:00  --------------------------------------------------------  IN: 2675 mL / OUT: 3635 mL / NET: -960 mL    10-02 @ 07:01  -  10-03 @ 05:41  --------------------------------------------------------  IN: 1525 mL / OUT: 1600 mL / NET: -75 mL      CAPILLARY BLOOD GLUCOSE      POCT Blood Glucose.: 128 mg/dL (02 Oct 2019 23:53)      Const: awake, oriented x0  Eyes: PERRL, no conjunctival injection  HENT: NCAT  CV: Tachycardic, no obvious murmurs, rubs, or gallops appreciated  Resp: +BL rales and rhonchi, on NC  GI: Abdomen soft, NTND, no guarding  Extremities: no peripheral edema, 2+ radial and DP pulses  Skin: scattered ecchymosis   MSK: No gross deformities appreciated  Neuro: A&Ox0, not following simple commands    LINES: PIV    HOSPITAL MEDICATIONS:  Standing Meds:  ALBUTerol/ipratropium for Nebulization 3 milliLiter(s) Nebulizer every 6 hours  cefTRIAXone   IVPB 1000 milliGRAM(s) IV Intermittent every 24 hours  chlorhexidine 4% Liquid 1 Application(s) Topical <User Schedule>  dexmedetomidine Infusion 0.5 MICROgram(s)/kG/Hr IV Continuous <Continuous>  dextrose 10%. 1000 milliLiter(s) IV Continuous <Continuous>  levETIRAcetam  IVPB 750 milliGRAM(s) IV Intermittent every 12 hours      PRN Meds:  LORazepam   Injectable 2 milliGRAM(s) IntraMuscular every 6 hours PRN  ondansetron Injectable 4 milliGRAM(s) IV Push every 6 hours PRN      LABS:                        8.2    38.76 )-----------( 49       ( 03 Oct 2019 00:00 )             24.1     Hgb Trend: 8.2<--, 7.9<--, 8.5<--, 8.7<--, 6.6<--  10-03    134<L>  |  96  |  24<H>  ----------------------------<  139<H>  2.9<LL>   |  23  |  1.51<H>    Ca    9.6      03 Oct 2019 00:00  Phos  1.7     10-03  Mg     1.4     10-03    TPro  6.4  /  Alb  3.5  /  TBili  1.0  /  DBili  x   /  AST  168<H>  /  ALT  166<H>  /  AlkPhos  111  10-03    Creatinine Trend: 1.51<--, 1.33<--, 1.06<--, 0.80<--, 0.46<--, 0.42<--  PT/INR - ( 03 Oct 2019 00:00 )   PT: 14.0 sec;   INR: 1.21 ratio         PTT - ( 03 Oct 2019 00:00 )  PTT:29.9 sec    Arterial Blood Gas:  10-02 @ 23:53  7.54/29/141/25/99/2.9  ABG lactate: --  Arterial Blood Gas:  10-02 @ 09:54  7.52/26/86/21/98/-.5  ABG lactate: --  Arterial Blood Gas:  10-02 @ 09:23  7.52/26/86/21/98/-.5  ABG lactate: --        MICROBIOLOGY:     Culture - Blood (collected 01 Oct 2019 02:13)  Source: .Blood  Preliminary Report (02 Oct 2019 03:01):    No growth to date.    Culture - Blood (collected 01 Oct 2019 02:13)  Source: .Blood  Preliminary Report (02 Oct 2019 03:01):    No growth to date.      RADIOLOGY:  [ ] Reviewed and interpreted by me    EKG: CHIEF COMPLAINT: syncope with BL SDH, TLS, and ADITHYA    Interval Events: Received 2mg Ativan for dyspnea and possible decorticate posturing with myoclonus. BIPAP dc'd. Repleted K and Jeannette. GOC discussion with 2 daughters - comfort care only.    REVIEW OF SYSTEMS:  [x] Unable to assess ROS because pt's mental status    OBJECTIVE:  ICU Vital Signs Last 24 Hrs  T(C): 36.8 (03 Oct 2019 04:00), Max: 37.8 (02 Oct 2019 20:00)  T(F): 98.2 (03 Oct 2019 04:00), Max: 100 (02 Oct 2019 20:00)  HR: 113 (03 Oct 2019 05:00) (113 - 139)  BP: 95/53 (03 Oct 2019 05:00) (95/53 - 151/72)  BP(mean): 67 (03 Oct 2019 05:00) (67 - 109)  ABP: --  ABP(mean): --  RR: 21 (03 Oct 2019 05:00) (21 - 35)  SpO2: 100% (03 Oct 2019 05:00) (97% - 100%)        10-01 @ 07:01  -  10-02 @ 07:00  --------------------------------------------------------  IN: 2675 mL / OUT: 3635 mL / NET: -960 mL    10-02 @ 07:01  -  10-03 @ 05:41  --------------------------------------------------------  IN: 1525 mL / OUT: 1600 mL / NET: -75 mL      CAPILLARY BLOOD GLUCOSE      POCT Blood Glucose.: 128 mg/dL (02 Oct 2019 23:53)      Const: awake, oriented x0  Eyes: PERRL, no conjunctival injection  HENT: NCAT  CV: Tachycardic, no obvious murmurs, rubs, or gallops appreciated  Resp: +BL rhonchi, on NC  GI: Abdomen soft, NTND, no guarding  Extremities: no peripheral edema, 2+ radial and DP pulses  Skin: scattered ecchymosis   MSK: No gross deformities appreciated  Neuro: A&Ox0, not following simple commands, withdraws to pain    LINES: [facility]    HOSPITAL MEDICATIONS:  Standing Meds:  ALBUTerol/ipratropium for Nebulization 3 milliLiter(s) Nebulizer every 6 hours  cefTRIAXone   IVPB 1000 milliGRAM(s) IV Intermittent every 24 hours  chlorhexidine 4% Liquid 1 Application(s) Topical <User Schedule>  dexmedetomidine Infusion 0.5 MICROgram(s)/kG/Hr IV Continuous <Continuous>  dextrose 10%. 1000 milliLiter(s) IV Continuous <Continuous>  levETIRAcetam  IVPB 750 milliGRAM(s) IV Intermittent every 12 hours      PRN Meds:  LORazepam   Injectable 2 milliGRAM(s) IntraMuscular every 6 hours PRN  ondansetron Injectable 4 milliGRAM(s) IV Push every 6 hours PRN      LABS:                        8.2    38.76 )-----------( 49       ( 03 Oct 2019 00:00 )             24.1     Hgb Trend: 8.2<--, 7.9<--, 8.5<--, 8.7<--, 6.6<--  10-03    134<L>  |  96  |  24<H>  ----------------------------<  139<H>  2.9<LL>   |  23  |  1.51<H>    Ca    9.6      03 Oct 2019 00:00  Phos  1.7     10-03  Mg     1.4     10-03    TPro  6.4  /  Alb  3.5  /  TBili  1.0  /  DBili  x   /  AST  168<H>  /  ALT  166<H>  /  AlkPhos  111  10-03    Creatinine Trend: 1.51<--, 1.33<--, 1.06<--, 0.80<--, 0.46<--, 0.42<--  PT/INR - ( 03 Oct 2019 00:00 )   PT: 14.0 sec;   INR: 1.21 ratio         PTT - ( 03 Oct 2019 00:00 )  PTT:29.9 sec    Arterial Blood Gas:  10-02 @ 23:53  7.54/29/141/25/99/2.9  ABG lactate: --  Arterial Blood Gas:  10-02 @ 09:54  7.52/26/86/21/98/-.5  ABG lactate: --  Arterial Blood Gas:  10-02 @ 09:23  7.52/26/86/21/98/-.5  ABG lactate: --        MICROBIOLOGY:     Culture - Blood (collected 01 Oct 2019 02:13)  Source: .Blood  Preliminary Report (02 Oct 2019 03:01):    No growth to date.    Culture - Blood (collected 01 Oct 2019 02:13)  Source: .Blood  Preliminary Report (02 Oct 2019 03:01):    No growth to date.      RADIOLOGY:  [ ] Reviewed and interpreted by me    EKG:

## 2019-10-03 NOTE — PROGRESS NOTE ADULT - PROBLEM SELECTOR PLAN 5
Patient with acute respiratory failure secondary to pulmonary edema when discontinued off CRRT requiring 1 session of pUF. Patient responding to Lasix, had 1900 mL urine output. Currently saturating well on room air. Recommend Lasix 80 mg IV Patient with acute respiratory failure secondary to pulmonary edema when discontinued off CRRT requiring 1 session of pUF. Patient responding to Lasix, had 1900 mL urine output. Currently saturating well on room air. Recommend Lasix 80 mg IV. Monitory respiratory status.

## 2019-10-03 NOTE — CHART NOTE - NSCHARTNOTEFT_GEN_A_CORE
Transferred to: PCU, care to be resumed by palliative care team under Dr. 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 Upon arrival to the ED, pt's mental status was waxing and waning, baseline AAOx4. ED course was c/b T 101, hypoxemia to 83%, tachycardia to 122, tachypnea to 30s and temporarily placed on bipap.     While in the MICU, pt was started on BIPAP for respiratory distress and CVVHD 9/27/19 for tumor lysis syndrome. TLS labs were followed q6 hours with improvement. CVVHD was discontinued on 10/1/19, and pt was transitioned to NC. A few hours after discontinuation of CRRT, pt went into flash pulmonary edema and received HD, during which 2L were removed. During this time, pt's mental status declined A&Ox0, and pt was also placed back on BiPAP.     Pt had 1 episode of seizure in the MICU, and was started on Keppra BID. vEEG the following day showed no seizures. Neuro recommended continuing Keppra BID.    Pt received multiple units of PRBCs and platelets. Bone marrow biopsy and aspiration was performed and showed hypercellular marrow with myeloproliferation and myelodysplastic change. Peripheral blood flow cytometry : 5 % blasts. Heme/onc discussed with family that pt was not a candidate for hydroxyurea or chemo. Pt's 2 daughters spoke to the palliative team and agreed that at this time, their mother would be best served by comfort care.       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    #NEURO  [place] MS - A&Ox0 today  BL subdural hematomas  -no plan for surgical intervention  -repeat CTH stable  -seizure on 9/27 --> keppra 750 bid, s/p ativan x1  - EEG 09-29-19 14:20 to 9-30-19 08:00: No seizures recorded. Potential seizure foci in the left frontal, left parieto-occipital, and right frontal regions. Moderate diffuse or multifocal cerebral dysfunction, not specific as to etiology.  - c/w Keppra BID    #RESP  Hypoxic respiratory failure on 4-5 L NC  No history of lung disease, but possible leukostasis from blast crisis?  Pt DNR/DNI, will not do aggressive measures at this time   -pulmonary edema ON --> s/p BiPAP and lasix  -Off BIPAP, on NC  -Morphine PRN for dyspnea    #CV  BP stable  s/p levo for hypotension  TTE: mild AR, mild-mod TR, EF WNL  off pressors  persistently tachy ON - s/p IV Lopressor. Will consider starting on Metoprolol  POCUS showing decreased systolic function  Repeat TTE results pending    #Renal:   ADITHYA: most likely hemodynamically mediated with superimposed tubular injury from TLS  -Monitor uric acid, phos, K  Anuric  -Dc'd leon  Hyperphosphatemia: Monitor. If continues to drop, consider switching to Phoxylum.  Acidemia: Stable on CRRT  Hypocalcemia: Monitor Ca, keep ionized Ca>1. Ca gluconate 2 g q6-8h.  Received IHD 10/1  Family requests no more HD, HD removed; comfort measures only    #ID  Febrile with blast crisis   Given immunocompromised state, covered broadly with vanc/gio  -Legionella negative. Azithro dc'd  -UCx E.coli>100,000  -CT: PNA  -Vanc dc'd. Meropenem dc'd  -Switched to Rocephin 10/1 (E.coli is sensitive) Day 3. 7 days abx total completed.    #GI  As per daughter no NGT   D10 @ 50ml/h    #Heme  -BM Bx ruled out Leukemia; shows hypercellular marrow with myeloproliferation and myelodysplastic change. Peripheral blood flow cytometry : 5 % blasts.  Goal Hb>7, Pl>10 (>20 if febrile, >50 before procedures)  -Not a candidate for hydroxyurea or chemo  -Palliative measures only    #Endo  D10 @50ml/h     #GOC  DNR/DNI  No NGT  Comfort measures only  Daughter is HCP      FOR FOLLOW UP:  1. Comfort measures only.

## 2019-10-03 NOTE — PROGRESS NOTE ADULT - ASSESSMENT
73yo F with hx MPD on HU and ASA (followed by Dr. Edwina Mono at [facility]) transferred from [facility] for b/l SDH after found unconscious and  with 20% blasts, PLT 40, scr of 2.7. found to have blast crisis with TLS. Nephrology consulted for ADITHYA.

## 2019-10-03 NOTE — PROGRESS NOTE ADULT - ATTENDING COMMENTS
Patient with increasing white cell count and decline in performance status. Worsening pulmonary congestion and she is not able to receive chemotherapy. Family has refused NG tube placement and she is being considered for palliative care evaluation. Prognosis for recovery is poor with bilateral subdural hematoma, cardiac dysfunction renal dysfunction and myeloproliferation.

## 2019-10-03 NOTE — PROGRESS NOTE ADULT - PROBLEM SELECTOR PLAN 1
Most likely hemodynamically mediated with superimposed tubular injury from TLS.   Presented with scr of 2.8 was at 2.7. Phos elevated, Ldh>2000, uric acid elevated, Decreasing UOP. Started on CVVHDF 09/27/19 due to TLS. Pateint TLS improving, wbc coming down, uric acid/k/baldev stable. Discontinued off CRRT on 10/1/2019. Patient required one session of pUF for respiratory distress. Labs today are WNL and patient noted to have 1900mL UO. If required, please give Lasix 80 mg IV for renal response. HemOnc follow up, hydroxyurea to be given. Please continue serial bladder scans, Monitor labs, monitor urine output Obtain renal sono with arterial and venous doppler given hyperviscosity causing renal vein or artery thrombosis. Most likely hemodynamically mediated with superimposed tubular injury from TLS.   Presented with scr of 2.8 was at 2.7. Phos elevated, Ldh>2000, uric acid elevated, Decreasing UOP. Started on CVVHDF 09/27/19 due to TLS. Patient TLS improving, wbc coming down, uric acid/k/baldev stable. Discontinued off CRRT on 10/1/2019. Patient required one session of pUF for respiratory distress. Labs today are WNL and patient noted to have 1900mL UO. If required, please give Lasix 80 mg IV for renal response. HemOnc follow up, hydroxyurea to be given. Please continue serial bladder scans, Monitor labs, monitor urine output Obtain renal sono with arterial and venous doppler given hyperviscosity causing renal vein or artery thrombosis.

## 2019-10-03 NOTE — GOALS OF CARE CONVERSATION - ADVANCED CARE PLANNING - CONVERSATION DETAILS
Discussed with daughter Dr. Sussy Ny, who she states is her HCP (no HCP documentation in chart at this time). She is fully aware of the current medical situation, and states her mother did not want this quality of life or degree of medical intervention. As such, decision made by Sussy for comfort care, namely DNR/DNI, no HD, limited VS, limited blood draws. She is agreeable to lasix and BiPAP if needed and medically indicated for comfort, and is open to opioids for comfort. She agrees with PCU transfer for symptom-directed care. MOLST re-compelted as prior MOLST was invalid due to missing witness, after confirmation with daughter.
Daughters came in to discuss GOC. Disucssion led by surgeon daughter. She had discussed this with the patietn in her lucid moments.  And had discussed the findings on this admission with her mothers PMD and ONcologist.  it seems all are in agreement with the poor prognosis.  And her mothers clear wishes were not to linger in this state.    As a result she would like no more HD, no aggressive measures and comfort driven care.   Agreed to remove HD cath, and provide PRN opiates for dyspnea as needed.

## 2019-10-03 NOTE — PROGRESS NOTE ADULT - SUBJECTIVE AND OBJECTIVE BOX
[facility] DIVISION OF KIDNEY DISEASES AND HYPERTENSION -- FOLLOW UP NOTE  --------------------------------------------------------------------------------  Chief Complaint: Syncope with bilateral SDH    24 hour events/subjective: Patient evaluated at bedside, on room air. Mildly tachypneic. Patient with good urine output. Received 120 mg Lasix in last 24 hours. Non tunneled HD catheter removed.    PAST HISTORY  --------------------------------------------------------------------------------  No significant changes to PMH, PSH, FHx, SHx, unless otherwise noted    ALLERGIES & MEDICATIONS  --------------------------------------------------------------------------------  Allergies    Alcohol (Unknown)  No Known Drug Allergies  shellfish (Unknown)    Intolerances    Standing Inpatient Medications  ALBUTerol/ipratropium for Nebulization 3 milliLiter(s) Nebulizer every 6 hours  cefTRIAXone   IVPB 1000 milliGRAM(s) IV Intermittent every 24 hours  chlorhexidine 4% Liquid 1 Application(s) Topical <User Schedule>  dexmedetomidine Infusion 0.5 MICROgram(s)/kG/Hr IV Continuous <Continuous>  dextrose 10%. 1000 milliLiter(s) IV Continuous <Continuous>  levETIRAcetam  IVPB 750 milliGRAM(s) IV Intermittent every 12 hours    REVIEW OF SYSTEMS  --------------------------------------------------------------------------------  Unable to obtain    All other systems were reviewed and are negative, except as noted.    VITALS/PHYSICAL EXAM  --------------------------------------------------------------------------------  T(C): 36.8 (10-03-19 @ 04:00), Max: 37.8 (10-02-19 @ 20:00)  HR: 112 (10-03-19 @ 07:00) (112 - 137)  BP: 96/65 (10-03-19 @ 07:00) (95/53 - 151/72)  RR: 20 (10-03-19 @ 07:00) (20 - 30)  SpO2: 100% (10-03-19 @ 07:00) (97% - 100%)  Wt(kg): --    10-02-19 @ 07:01  -  10-03-19 @ 07:00  --------------------------------------------------------  IN: 1750 mL / OUT: 1900 mL / NET: -150 mL    Physical Exam:  	Gen: NAD  	HEENT: MMM  	Pulm: CTA B/L  	CV: S1S2  	Abd: Soft, +BS   	Ext: No LE edema B/L  	Neuro: Awake  	Skin: Warm and dry  	  LABS/STUDIES  --------------------------------------------------------------------------------              8.2    38.76 >-----------<  49       [10-03-19 @ 00:00]              24.1     134  |  96  |  24  ----------------------------<  139      [10-03-19 @ 00:00]  2.9   |  23  |  1.51        Ca     9.6     [10-03-19 @ 00:00]      Mg     1.4     [10-03-19 @ 00:00]      Phos  1.7     [10-03-19 @ 00:00]    TPro  6.4  /  Alb  3.5  /  TBili  1.0  /  DBili  x   /  AST  168  /  ALT  166  /  AlkPhos  111  [10-03-19 @ 00:00]    PT/INR: PT 14.0 , INR 1.21       [10-03-19 @ 00:00]  PTT: 29.9       [10-03-19 @ 00:00]    Uric acid 3.7      [10-03-19 @ 00:00]  LDH 3348      [10-03-19 @ 00:00]    Creatinine Trend:  SCr 1.51 [10-03 @ 00:00]  SCr 1.33 [10-02 @ 10:53]  SCr 1.06 [10-02 @ 00:14]  SCr 0.80 [10-01 @ 17:23]  SCr 0.46 [10-01 @ 06:02]    Urinalysis - [09-27-19 @ 01:46]      Color Yellow / Appearance Slightly Turbid / SG 1.018 / pH 6.5      Gluc Negative / Ketone Trace  / Bili Negative / Urobili Negative       Blood Moderate / Protein 100 / Leuk Est Moderate / Nitrite Negative      RBC 6-10 / WBC >50 / Hyaline 0-2 / Gran  / Sq Epi  / Non Sq Epi Few / Bacteria Many [facility] DIVISION OF KIDNEY DISEASES AND HYPERTENSION -- FOLLOW UP NOTE  --------------------------------------------------------------------------------  Chief Complaint: Syncope with bilateral SDH    24 hour events/subjective: Patient evaluated at bedside, on room air. Mildly tachypneic. Patient with good urine output. Received 120 mg Lasix in last 24 hours. Non tunneled HD catheter removed.    PAST HISTORY  --------------------------------------------------------------------------------  No significant changes to PMH, PSH, FHx, SHx, unless otherwise noted    ALLERGIES & MEDICATIONS  --------------------------------------------------------------------------------  Allergies    Alcohol (Unknown)  No Known Drug Allergies  shellfish (Unknown)    Intolerances    Standing Inpatient Medications  ALBUTerol/ipratropium for Nebulization 3 milliLiter(s) Nebulizer every 6 hours  cefTRIAXone   IVPB 1000 milliGRAM(s) IV Intermittent every 24 hours  chlorhexidine 4% Liquid 1 Application(s) Topical <User Schedule>  dexmedetomidine Infusion 0.5 MICROgram(s)/kG/Hr IV Continuous <Continuous>  dextrose 10%. 1000 milliLiter(s) IV Continuous <Continuous>  levETIRAcetam  IVPB 750 milliGRAM(s) IV Intermittent every 12 hours    REVIEW OF SYSTEMS  --------------------------------------------------------------------------------  Unable to obtain    All other systems were reviewed and are negative, except as noted.    VITALS/PHYSICAL EXAM  --------------------------------------------------------------------------------  T(C): 36.8 (10-03-19 @ 04:00), Max: 37.8 (10-02-19 @ 20:00)  HR: 112 (10-03-19 @ 07:00) (112 - 137)  BP: 96/65 (10-03-19 @ 07:00) (95/53 - 151/72)  RR: 20 (10-03-19 @ 07:00) (20 - 30)  SpO2: 100% (10-03-19 @ 07:00) (97% - 100%)  Wt(kg): --    10-02-19 @ 07:01  -  10-03-19 @ 07:00  --------------------------------------------------------  IN: 1750 mL / OUT: 1900 mL / NET: -150 mL    Physical Exam:  	Gen: NAD  	HEENT: MMM  	Pulm: CTA B/L  	CV: S1S2  	Abd: Soft, +BS   	Ext: No LE edema B/L  	Neuro: Awake      	Skin: Warm and dry  	  LABS/STUDIES  --------------------------------------------------------------------------------              8.2    38.76 >-----------<  49       [10-03-19 @ 00:00]              24.1     134  |  96  |  24  ----------------------------<  139      [10-03-19 @ 00:00]  2.9   |  23  |  1.51        Ca     9.6     [10-03-19 @ 00:00]      Mg     1.4     [10-03-19 @ 00:00]      Phos  1.7     [10-03-19 @ 00:00]    TPro  6.4  /  Alb  3.5  /  TBili  1.0  /  DBili  x   /  AST  168  /  ALT  166  /  AlkPhos  111  [10-03-19 @ 00:00]    PT/INR: PT 14.0 , INR 1.21       [10-03-19 @ 00:00]  PTT: 29.9       [10-03-19 @ 00:00]    Uric acid 3.7      [10-03-19 @ 00:00]  LDH 3348      [10-03-19 @ 00:00]    Creatinine Trend:  SCr 1.51 [10-03 @ 00:00]  SCr 1.33 [10-02 @ 10:53]  SCr 1.06 [10-02 @ 00:14]  SCr 0.80 [10-01 @ 17:23]  SCr 0.46 [10-01 @ 06:02]    Urinalysis - [09-27-19 @ 01:46]      Color Yellow / Appearance Slightly Turbid / SG 1.018 / pH 6.5      Gluc Negative / Ketone Trace  / Bili Negative / Urobili Negative       Blood Moderate / Protein 100 / Leuk Est Moderate / Nitrite Negative      RBC 6-10 / WBC >50 / Hyaline 0-2 / Gran  / Sq Epi  / Non Sq Epi Few / Bacteria Many

## 2019-10-03 NOTE — PROGRESS NOTE ADULT - SUBJECTIVE AND OBJECTIVE BOX
Neurology Follow up note    Patient is a 74y old  Female who presents with a chief complaint of syncope with b/l SDH (03 Oct 2019 16:39)      Subjective:Interval History - No events overnight. Pt not speaking. Points to neck where bandage is when asked if she has any discomfort.     Objective:   Vital Signs Last 24 Hrs  T(C): 37.1 (03 Oct 2019 16:00), Max: 37.8 (02 Oct 2019 20:00)  T(F): 98.7 (03 Oct 2019 16:00), Max: 100 (02 Oct 2019 20:00)  HR: 124 (03 Oct 2019 19:00) (112 - 132)  BP: 110/57 (03 Oct 2019 19:00) (95/53 - 138/72)  BP(mean): 76 (03 Oct 2019 19:00) (67 - 99)  RR: 16 (03 Oct 2019 19:00) (16 - 29)  SpO2: 100% (03 Oct 2019 19:00) (98% - 100%)    General Exam:   General appearance: No acute distress. Appears tired. Emaciated. Multiple ecchymosis. Bandage on the left side of neck.               Respiratory: tachypneic    Neurological Exam:  Mental Status: Not speaking. Not following commands consistently.     Cranial Nerves:  PERRL, EOMI, eyes wide. blinks to threat bilat, no nystagmus.    No facial asymmetry.  Hearing intact bilaterally.  Tongue midline.    Motor: Flaccid tone. Little effort. Antigravity in UEs.               Coord: unable to assess    Tremor: No resting, postural or action tremor.  No myoclonus.    Sensation: intact to light touch,    Deep Tendon Reflexes: 1+ bilateral biceps, triceps, brachioradialis, knee and ankle. No Babinski present.    Gait: deferred    Other:    10-03    134<L>  |  96  |  24<H>  ----------------------------<  139<H>  2.9<LL>   |  23  |  1.51<H>    Ca    9.6      03 Oct 2019 00:00  Phos  1.7     10-03  Mg     1.4     10-03    TPro  6.4  /  Alb  3.5  /  TBili  1.0  /  DBili  x   /  AST  168<H>  /  ALT  166<H>  /  AlkPhos  111  10-03    10-03    134<L>  |  96  |  24<H>  ----------------------------<  139<H>  2.9<LL>   |  23  |  1.51<H>    Ca    9.6      03 Oct 2019 00:00  Phos  1.7     10-03  Mg     1.4     10-03    TPro  6.4  /  Alb  3.5  /  TBili  1.0  /  DBili  x   /  AST  168<H>  /  ALT  166<H>  /  AlkPhos  111  10-03    LIVER FUNCTIONS - ( 03 Oct 2019 00:00 )  Alb: 3.5 g/dL / Pro: 6.4 g/dL / ALK PHOS: 111 U/L / ALT: 166 U/L / AST: 168 U/L / GGT: x                                 8.2    38.76 )-----------( 49       ( 03 Oct 2019 00:00 )             24.1     Radiology    < from: CT Head No Cont (09.27.19 @ 20:41) >  Findings:    Redemonstration of bilateral acute subdural hematomas measuring 0.6 cm on   the right and 0.4 cm on the left, in greatest depth. Tiny bilateral   parafalcine subdural hematomas are also noted. There is minimal leftward   midline shift, unchanged. Ventricle size is unchanged. Otherwise,   age-related involutional changes and chronic microvascular ischemic type   changes are noted. The basal cisterns are patent. The calvarium is   intact. The paranasal sinuses and tympanomastoid cavities appear free of   acute disease.    Impression:    Stable examination from 9/26/2019 at 9:15 PM.    < from: CT Abdomen and Pelvis No Cont (09.27.19 @ 20:42) >  CHEST:     LUNGS AND LARGE AIRWAYS: Patent central airways. Patchy opacities with   interlobular septal thickening in the right upper and bilateral lower   lobes.  PLEURA: Trace right pleural effusion. No pneumothorax.  VESSELS: Calcifications of the aortic valve and coronary arteries. Left   IJ approach central venous catheter terminates in the SVC.   HEART: Heart size is normal. No pericardial effusion.  MEDIASTINUM AND VELIA: Mild mediastinal adenopathy; representative right   lower paratracheal lymph node (3:49) measures 1.6 x 1.4 cm, nonspecific.  CHEST WALL AND LOWER NECK: Within normal limits.    ABDOMEN AND PELVIS:    LIVER: Hepatomegaly, 22.1 cm craniocaudal dimension. Right hepatic cyst.  BILE DUCTS: Normal caliber.  GALLBLADDER: Distended. Cholelithiasis.  SPLEEN: Splenomegaly, 19.5 cm craniocaudal dimension. Peripheral high   density along the spleenis nonspecific. A low-density lesion is   questioned in the mid to lower spleen.  PANCREAS: Within normal limits.  ADRENALS: Within normal limits.  KIDNEYS/URETERS: Nonobstructive left lower pole stone measuring 0.2 cm.   No hydronephrosis.    BLADDER: Decompressed by Aj catheter.  REPRODUCTIVE ORGANS: Uterus and adnexa within normal limits.    BOWEL: No bowel obstruction. Appendix is within normal limits.  PERITONEUM: No ascites.  VESSELS: Atherosclerotic changes.  RETROPERITONEUM/LYMPH NODES: No lymphadenopathy.    ABDOMINAL WALL: Within normal limits.        MEDICATIONS  (STANDING):  ALBUTerol/ipratropium for Nebulization 3 milliLiter(s) Nebulizer every 6 hours  chlorhexidine 4% Liquid 1 Application(s) Topical <User Schedule>  dextrose 10%. 1000 milliLiter(s) (50 mL/Hr) IV Continuous <Continuous>  levETIRAcetam  IVPB 750 milliGRAM(s) IV Intermittent every 12 hours    MEDICATIONS  (PRN):  HYDROmorphone  Injectable 0.2 milliGRAM(s) IV Push every 2 hours PRN dyspnea  HYDROmorphone  Injectable 0.2 milliGRAM(s) IV Push every 2 hours PRN mod-severe pain  LORazepam   Injectable 2 milliGRAM(s) IntraMuscular every 6 hours PRN Anxiety/respiratory distress  ondansetron Injectable 4 milliGRAM(s) IV Push every 6 hours PRN Nausea

## 2019-10-04 NOTE — PROGRESS NOTE ADULT - PROBLEM SELECTOR PLAN 5
d/w the patient's daughters (including her HCP) that indicated the patient was a really active person and that she may have never wanted to live with the level of disability she is having right now.   GOC are towards preventing and treating any distressful symptom at the EOL.

## 2019-10-04 NOTE — PROGRESS NOTE ADULT - SUBJECTIVE AND OBJECTIVE BOX
HPI: 74F with PMH of MDS here from 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 Has had waxing mental status; no plans for surgical intervention of SDH. Course c/b seizures on 9/27, now on keppra, with EEG showing potential foci in L frontal, L parieto-occipital, and R frontal regions. Had pulmonary edema overnight, requiring BiPAP and lasix, and has morphine for dyspnea. Currently off pressors. Also had HD on 10/1 for TLS, family now decided on no further HD. Was also on broad-spectrum abx for fever. Palliative called for GOC and possible transition to PCU care.    Subjective/Objective:  Patient was seen and examined with her daughters by her bedside. She was having agitation this AM and it improved with Ativan.     Overnight events: Agitation and labored breathing this am.     PERTINENT PM/SXH:   Spleen enlarged  MDS (myelodysplastic syndrome)    No significant past surgical history    FAMILY HISTORY:  Family history of CVA: mother    ITEMS NOT CHECKED ARE NOT PRESENT    SOCIAL HISTORY:   Significant other/partner:  [ ]    Children:  [ X]    Jainism/Spirituality:  Substance hx:  [ ]   Tobacco hx:  [ ]   Alcohol hx: [ ] Drug use hx: [ ]  Home Opioid hx:  [ ] ([employer] Reference No: [id number])    Rx Written	Rx Dispensed	Drug	Quantity	Days Supply	Prescriber Name  09/12/2019	09/20/2019	diazepam 5 mg tablet	30	30	Novoselac, Bel[place] V  06/12/2019	06/15/2019	diazepam 5 mg tablet	30	30	Novoselac, [place] V  04/12/2019	04/29/2019	diazepam 5 mg tablet	30	30	Novoselac, [place]ton V  01/30/2019	02/07/2019	diazepam 5 mg tablet	30	30	Novoselac, [place] V    Living Situation: [ ]Home  [ ]Long term care  [ ]Rehab [ ]Other  Occupation:    ADVANCE DIRECTIVES:    DNR [X ]  MOLST  [X ]    Living Will  [ ]   DECISION MAKER(s):  [X ] Health Care Proxy(s)  [ ] Surrogate(s)  [ ] Guardian           Name(s) and Contact(s): Sussy Ny MD (daughter - [phone])    BASELINE (I)ADL(s) (prior to admission):  [place]: [ x]Total  [ ] Moderate [ ]Dependent    Allergies    Alcohol (Unknown)  No Known Drug Allergies  shellfish (Unknown)    Intolerances    MEDICATIONS  (STANDING):  chlorhexidine 4% Liquid 1 Application(s) Topical <User Schedule>  levETIRAcetam  IVPB 750 milliGRAM(s) IV Intermittent every 12 hours  LORazepam   Injectable 0.5 milliGRAM(s) IV Push every 4 hours    MEDICATIONS  (PRN):  bisacodyl Suppository 10 milliGRAM(s) Rectal daily PRN Constipation  HYDROmorphone  Injectable 0.2 milliGRAM(s) IV Push every 1 hour PRN mod-severe pain  HYDROmorphone  Injectable 0.2 milliGRAM(s) IV Push every 1 hour PRN dyspnea  LORazepam   Injectable 2 milliGRAM(s) IV Push every 2 hours PRN Anxiety/respiratory distress or seizures  ondansetron Injectable 4 milliGRAM(s) IV Push every 6 hours PRN Nausea    PRESENT SYMPTOMS:   Source if other than patient:  [x ]Family   [ ]Team     Pain (Impact on QOL):    Location -         Minimal acceptable level (0-10 scale):                    Aggravating factors -  Quality -  Radiation -  Severity (0-10 scale) -    Timing -    PAIN AD Score: 2    [email or web address] (press ctrl +  left click to view)    Dyspnea:                           [ ]Mild [ ]Moderate [ ]Severe  Anxiety:                             [ ]Mild [ ]Moderate [ ]Severe  Fatigue:                             [ ]Mild [ ]Moderate [ ]Severe  Nausea:                             [ ]Mild [ ]Moderate [ ]Severe  Loss of appetite:              [ ]Mild [ ]Moderate [ ]Severe  Constipation:                    [ ]Mild [ ]Moderate [ ]Severe    Other Symptoms:  [ ]All other review of systems negative   [ X]Unable to obtain due to poor mentation     Karnofsky Performance Score/Palliative Performance Status Version 2:   <30%    PHYSICAL EXAM:  Vital Signs Last 24 Hrs  T(C): 36.4 (04 Oct 2019 07:39), Max: 37.5 (03 Oct 2019 22:00)  T(F): 97.6 (04 Oct 2019 07:39), Max: 99.5 (03 Oct 2019 22:00)  HR: 129 (04 Oct 2019 07:39) (117 - 129)  BP: 120/77 (04 Oct 2019 07:39) (106/56 - 120/77)  BP(mean): 88 (03 Oct 2019 22:00) (75 - 93)  RR: 20 (04 Oct 2019 07:39) (16 - 25)  SpO2: 93% (04 Oct 2019 07:39) (93% - 100%)    GENERAL:  [ ]Alert  [ ]Oriented x   [X ]Lethargic  [ ]Cachexia  [ ]Unarousable  [ ]Verbal  [X ]Non-Verbal    Behavioral:   [ ] Anxiety  [ ] Delirium [ ] Agitation [ ] Other    HEENT:  [ ]Normal   [ ]Dry mouth   [ ]ET Tube/Trach  [ ]Oral lesions    PULMONARY:   [X ]Clear [ ]Tachypnea  [ ]Audible excessive secretions   [ ]Rhonchi        [ ]Right [ ]Left [ ]Bilateral  [ ]Crackles        [ ]Right [ ]Left [ ]Bilateral  [ ]Wheezing     [ ]Right [ ]Left [ ]Bilateral    CARDIOVASCULAR:    [X ]Regular [ ]Irregular [ ]Tachy  [ ]Umesh [ ]Murmur [ ]Other    GASTROINTESTINAL:  [X ]Soft  [ ]Distended   [X ]+BS  [X ]Non tender [ ]Tender  [ ]PEG [ ]OGT/ NGT  Last BM: 10/26    GENITOURINARY:  [ ]Normal [ ] Incontinent   [ ]Oliguria/Anuria   [ ]Aj    MUSCULOSKELETAL:   [ ]Normal   [ ]Weakness  [X ]Bed/Wheelchair bound [ ]Edema    NEUROLOGIC:   [ ]No focal deficits  [X ] Cognitive impairment  [ ] Dysphagia [ ]Dysarthria [ ] Paresis [ ]Other     SKIN:   [ x]Normal   [ ]Pressure ulcer(s)  [ ]Rash    CRITICAL CARE:  [ ] Shock Present  [ ]Septic [ ]Cardiogenic [ ]Neurologic [ ]Hypovolemic  [ ]  Vasopressors [ ]  Inotropes   [ ] Respiratory failure present  [ ] Acute  [ ] Chronic [ ] Hypoxic  [ ] Hypercarbic [ ] Other  [ ] Other organ failure     LABS: reviewed                                            8.2    38.76 )-----------( 49       ( 03 Oct 2019 00:00 )             24.1   10-03    134<L>  |  96  |  24<H>  ----------------------------<  139<H>  2.9<LL>   |  23  |  1.51<H>    Ca    9.6      03 Oct 2019 00:00  Phos  1.7     10-03  Mg     1.4     10-03    TPro  6.4  /  Alb  3.5  /  TBili  1.0  /  DBili  x   /  AST  168<H>  /  ALT  166<H>  /  AlkPhos  111  10-03      RADIOLOGY & ADDITIONAL STUDIES:    CXR 10/2/19: Interval decrease in bilateral pleural effusions.     PROTEIN CALORIE MALNUTRITION:   [ ] PPSV2 < or = to 30% [ ] significant weight loss  [ ] poor nutritional intake [ ] catabolic state [ ] anasarca     Albumin, Serum: 3.5 g/dL (10-03-19 @ 00:00)  Artificial Nutrition [ ]     REFERRALS:   [ ]Chaplaincy  [ ] Hospice  [ ]Child Life  [ ]Social Work  [ ]Case management [ ]Holistic Therapy     Goals of Care Discussion Document:     ........ ....... ...... ..... .... ... .. .  Darin Rogers MD  Palliative Medicine  office: [phone] | [phone]  pager: [phone]

## 2019-10-04 NOTE — PROGRESS NOTE ADULT - ASSESSMENT
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 Has had waxing mental status; no plans for surgical intervention of SDH. Course c/b seizures on 9/27, now on keppra, with EEG showing potential foci in L frontal, L parieto-occipital, and R frontal regions. Had pulmonary edema overnight, requiring BiPAP and lasix, and has morphine for dyspnea. Currently off pressors. Also had HD on 10/1 for TLS, family now decided on no further HD. Was also on broad-spectrum abx for fever. Palliative called for GOC and possible transition to PCU care.

## 2019-10-05 NOTE — PROGRESS NOTE ADULT - PROBLEM SELECTOR PLAN 1
Likely EOL Delirium   Will start Ativan 0.5 mg IV q 4 ATC and will continue Ativan 2 mg IV q 2 PRN Likely EOL delirium, better controlled as patient progresses through the dying process  -c/w Ativan 0.5mg q4h ATC and Ativan 2mg IV q2h PRN

## 2019-10-05 NOTE — PROGRESS NOTE ADULT - ASSESSMENT
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 Has had waxing mental status; no plans for surgical intervention of SDH. Course c/b seizures on 9/27, now on keppra, with EEG showing potential foci in L frontal, L parieto-occipital, and R frontal regions. Had pulmonary edema overnight, requiring BiPAP and lasix, and has morphine for dyspnea. Currently off pressors. Also had HD on 10/1 for TLS, family now decided on no further HD. Was also on broad-spectrum abx for fever. Palliative called for GOC and possible transition to PCU care.      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 Course c/b seizures, encephalopathy, renal failure. Palliative consulted, goals are now comfort-oriented, and patient was transferred to PCU for symptom management.

## 2019-10-05 NOTE — PROGRESS NOTE ADULT - PROBLEM SELECTOR PLAN 6
Emotional support was provided. Updates provided to family, emotional support provided. Continue with supportive care as patient transitions through the EOL.

## 2019-10-05 NOTE — PROGRESS NOTE ADULT - PROBLEM SELECTOR PLAN 5
d/w the patient's daughters (including her HCP) that indicated the patient was a really active person and that she may have never wanted to live with the level of disability she is having right now.   GOC are towards preventing and treating any distressful symptom at the EOL. GOC established with family. Patient is DNR/DNI with MOLST in chart.

## 2019-10-05 NOTE — PROGRESS NOTE ADULT - SUBJECTIVE AND OBJECTIVE BOX
GAP TEAM PALLIATIVE CARE UNIT PROGRESS NOTE:      [] Patient on hospice program.    INDICATION FOR PALLIATIVE CARE UNIT SERVICES: Symptom Control, Comfort Measures, GOC    INTERVAL HPI/OVERNIGHT EVENTS: No acute events overnight. Required PRNs of  in 24hrs.    DNR on chart: Yes  Yes    Allergies    Alcohol (Unknown)  No Known Drug Allergies  shellfish (Unknown)    Intolerances    MEDICATIONS  (STANDING):  levETIRAcetam  IVPB 750 milliGRAM(s) IV Intermittent every 12 hours  LORazepam   Injectable 0.5 milliGRAM(s) IV Push every 4 hours    MEDICATIONS  (PRN):  bisacodyl Suppository 10 milliGRAM(s) Rectal daily PRN Constipation  HYDROmorphone  Injectable 0.2 milliGRAM(s) IV Push every 1 hour PRN mod-severe pain  HYDROmorphone  Injectable 0.2 milliGRAM(s) IV Push every 1 hour PRN dyspnea  LORazepam   Injectable 2 milliGRAM(s) IV Push every 2 hours PRN Anxiety/respiratory distress or seizures  ondansetron Injectable 4 milliGRAM(s) IV Push every 6 hours PRN Nausea    ITEMS UNCHECKED ARE NOT PRESENT    PRESENT SYMPTOMS: [x]Unable to obtain due to poor mentation   Source if other than patient:  [x]Family   [x]Team     Pain: [ ] yes [ ] no  QOL impact -   Location -                    Aggravating factors -  Quality -  Radiation -  Timing-  Severity (0-10 scale):  Minimal acceptable level (0-10 scale):     Dyspnea:                           []Mild []Moderate []Severe  Anxiety:                             []Mild []Moderate []Severe  Fatigue:                             []Mild []Moderate []Severe  Nausea:                             []Mild []Moderate []Severe  Loss of appetite:              []Mild []Moderate []Severe  Constipation:                    []Mild []Moderate []Severe    PAINAD Score:    http://geriatrictoolkit.[facility]/cog/painad.pdf  		  Other Symptoms:  [x]All other review of systems negative     Karnofsky Performance Score/Palliative Performance Status Version 2:         %  http://npcrc.org/files/news/palliative_performance_scale_ppsv2.pdf    PHYSICAL EXAM:  Vital Signs Last 24 Hrs  T(C): 36.9 (05 Oct 2019 08:29), Max: 36.9 (05 Oct 2019 08:29)  T(F): 98.4 (05 Oct 2019 08:29), Max: 98.4 (05 Oct 2019 08:29)  HR: 114 (05 Oct 2019 08:29) (114 - 114)  BP: 99/55 (05 Oct 2019 08:29) (99/55 - 99/55)  BP(mean): --  RR: 20 (05 Oct 2019 08:29) (20 - 20)  SpO2: 94% (05 Oct 2019 08:29) (94% - 94%) I&O's Summary    04 Oct 2019 07:01  -  05 Oct 2019 07:00  --------------------------------------------------------  IN: 100 mL / OUT: 300 mL / NET: -200 mL      GENERAL:  []Alert  []Oriented x   []Lethargic  []Cachexia  []Unarousable  []Verbal  []Non-Verbal  Behavioral:   [] Anxiety  [] Delirium [] Agitation [] Other  HEENT:  []Normal   []Dry mouth   []ET Tube/Trach  []Oral lesions  PULMONARY:   [x]Clear []Tachypnea  []Audible excessive secretions   []Rhonchi        []Right []Left []Bilateral  []Crackles        []Right []Left []Bilateral  []Wheezing     []Right []Left []Bilateral  CARDIOVASCULAR:    [x]Regular []Irregular []Tachy  []Umesh []Murmur []Other  GASTROINTESTINAL:  [x]Soft []Distended [x]+BS [x]Non tender []Tender []PEG []OGT/ NGT   Last BM:       GENITOURINARY:  []Normal [x] Incontinent   []Oliguria/Anuria   []Aj  MUSCULOSKELETAL:   []Normal  []Weakness  [x]Bed/Wheelchair bound []Edema  NEUROLOGIC:   []No focal deficits  [x]Cognitive impairment  []Dysphagia []Dysarthria []Paresis []Other   SKIN:   []Normal   []Pressure ulcer(s)  [x]Rash: Please refer to nursing notes for further documentation    CRITICAL CARE:  [ ] Shock Present  [ ]Septic [ ]Cardiogenic [ ]Neurologic [ ]Hypovolemic  [ ]  Vasopressors [ ]  Inotropes   [ ] Respiratory failure present [ ] Mechanical Ventilation [ ] Non-invasive ventilatory support [ ] High-Flow  [ ] Acute  [ ] Chronic [ ] Hypoxic  [ ] Hypercarbic [ ] Other  [ ] Other organ failure     LABS: None new          RADIOLOGY & ADDITIONAL STUDIES: None new    PROTEIN CALORIE MALNUTRITION:   [x] PPSV2 < or = 30% [] significant weight loss [] poor nutritional intake [] anasarca [] catabolic state   Albumin, Serum: 3.5 g/dL (10-03-19 @ 00:00)   Artificial Nutrition []     REFERRALS:   []Chaplaincy  []Hospice  []Child Life  [x]Social Work  []Case management []Holistic Therapy []Physical Therapy []Dietary   Goals of Care Document:   Care Coordination Document: Progress Notes - Care Coordination [C. Provider] (10-03-19 @ 13:53)                                       Progress Notes    PROGRESS NOTE  Date & Time of Note   2019-10-03 13:53    Notes    Notes: Social work continues to follow patient on MICU. Per chart, decision  made by patient's two daughters to pursue comfort care measures only. HD  discontinued. LCSW attempted visit, however no family present at bedside and  patient no longer able to participate in interview. No concerns identified at  this time. Daughters remain surrogate decision makers. Palliative consulted.  Social work to continue to remain available as needed.       Electronically signed by:  Kelsi Ga  Electronically signed on:  2019-10-03  13:53 GAP TEAM PALLIATIVE CARE UNIT PROGRESS NOTE:      [] Patient on hospice program.    INDICATION FOR PALLIATIVE CARE UNIT SERVICES: Symptom Control, Comfort Measures    INTERVAL HPI/OVERNIGHT EVENTS: No acute events overnight. Required PRNs of Dilaudid x3 in 24hrs.  This AM, noted to have bladder scan of 746cc, leon placed. continues to have intermittently labored breathing.    DNR on chart: Yes  Yes    Allergies    Alcohol (Unknown)  No Known Drug Allergies  shellfish (Unknown)    Intolerances    MEDICATIONS  (STANDING):  levETIRAcetam  IVPB 750 milliGRAM(s) IV Intermittent every 12 hours  LORazepam   Injectable 0.5 milliGRAM(s) IV Push every 4 hours    MEDICATIONS  (PRN):  bisacodyl Suppository 10 milliGRAM(s) Rectal daily PRN Constipation  HYDROmorphone  Injectable 0.2 milliGRAM(s) IV Push every 1 hour PRN mod-severe pain  HYDROmorphone  Injectable 0.2 milliGRAM(s) IV Push every 1 hour PRN dyspnea  LORazepam   Injectable 2 milliGRAM(s) IV Push every 2 hours PRN Anxiety/respiratory distress or seizures  ondansetron Injectable 4 milliGRAM(s) IV Push every 6 hours PRN Nausea    ITEMS UNCHECKED ARE NOT PRESENT    PRESENT SYMPTOMS: [x]Unable to obtain due to poor mentation   Source if other than patient:  []Family   [x]Team     Pain: [ ] yes [ ] no  QOL impact -   Location -                    Aggravating factors -  Quality -  Radiation -  Timing-  Severity (0-10 scale):  Minimal acceptable level (0-10 scale):     Dyspnea:                           []Mild [x]Moderate []Severe  Anxiety:                             [x]Mild []Moderate []Severe  Fatigue:                             []Mild []Moderate []Severe  Nausea:                             []Mild []Moderate []Severe  Loss of appetite:              []Mild []Moderate []Severe  Constipation:                    []Mild []Moderate []Severe    PAINAD Score: 3    http://geriatrictoolkit.[employer]/cog/painad.pdf  		  Other Symptoms:  [x]All other review of systems negative     Karnofsky Performance Score/Palliative Performance Status Version 2:         20%  http://npcrc.org/files/news/palliative_performance_scale_ppsv2.pdf    PHYSICAL EXAM:  Vital Signs Last 24 Hrs  T(C): 36.9 (05 Oct 2019 08:29), Max: 36.9 (05 Oct 2019 08:29)  T(F): 98.4 (05 Oct 2019 08:29), Max: 98.4 (05 Oct 2019 08:29)  HR: 114 (05 Oct 2019 08:29) (114 - 114)  BP: 99/55 (05 Oct 2019 08:29) (99/55 - 99/55)  BP(mean): --  RR: 20 (05 Oct 2019 08:29) (20 - 20)  SpO2: 94% (05 Oct 2019 08:29) (94% - 94%) I&O's Summary    04 Oct 2019 07:01  -  05 Oct 2019 07:00  --------------------------------------------------------  IN: 100 mL / OUT: 300 mL / NET: -200 mL      GENERAL:  []Alert  []Oriented x   [x]Lethargic  []Cachexia  []Unarousable  []Verbal  [x]Non-Verbal  Behavioral:   [] Anxiety  [x] Delirium [] Agitation [] Other  HEENT:  []Normal   [x]Dry mouth   []ET Tube/Trach  []Oral lesions  PULMONARY:   [x]Clear []Tachypnea  []Audible excessive secretions   []Rhonchi        []Right []Left []Bilateral  []Crackles        []Right []Left []Bilateral  []Wheezing     []Right []Left []Bilateral  CARDIOVASCULAR:    [x]Regular []Irregular []Tachy  []Umesh []Murmur []Other  GASTROINTESTINAL:  [x]Soft []Distended [x]+BS [x]Non tender []Tender []PEG []OGT/ NGT   Last BM:  9/26    GENITOURINARY:  []Normal [] Incontinent   []Oliguria/Anuria   [x]Leon  MUSCULOSKELETAL:   []Normal  [x]Weakness  [x]Bed/Wheelchair bound []Edema  NEUROLOGIC:   []No focal deficits  [x]Cognitive impairment  []Dysphagia []Dysarthria []Paresis []Other   SKIN:   []Normal   []Pressure ulcer(s)  [x]Rash: Please refer to nursing notes for further documentation    CRITICAL CARE:  [ ] Shock Present  [ ]Septic [ ]Cardiogenic [ ]Neurologic [ ]Hypovolemic  [ ]  Vasopressors [ ]  Inotropes   [ ] Respiratory failure present [ ] Mechanical Ventilation [ ] Non-invasive ventilatory support [ ] High-Flow  [ ] Acute  [ ] Chronic [ ] Hypoxic  [ ] Hypercarbic [ ] Other  [x] Other organ failure     LABS: None new          RADIOLOGY & ADDITIONAL STUDIES: None new    PROTEIN CALORIE MALNUTRITION:   [x] PPSV2 < or = 30% [] significant weight loss [] poor nutritional intake [] anasarca [] catabolic state   Albumin, Serum: 3.5 g/dL (10-03-19 @ 00:00)   Artificial Nutrition []     REFERRALS:   []Chaplaincy  []Hospice  []Child Life  [x]Social Work  []Case management []Holistic Therapy []Physical Therapy []Dietary   Goals of Care Document:   Care Coordination Document: Progress Notes - Care Coordination [C. Provider] (10-03-19 @ 13:53)                                       Progress Notes    PROGRESS NOTE  Date & Time of Note   2019-10-03 13:53    Notes    Notes: Social work continues to follow patient on MICU. Per chart, decision  made by patient's two daughters to pursue comfort care measures only. HD  discontinued. LCSW attempted visit, however no family present at bedside and  patient no longer able to participate in interview. No concerns identified at  this time. Daughters remain surrogate decision makers. Palliative consulted.  Social work to continue to remain available as needed.       Electronically signed by:  Kelsi Ga  Electronically signed on:  2019-10-03  13:53

## 2019-10-06 NOTE — PROGRESS NOTE ADULT - PROBLEM SELECTOR PROBLEM 5
Acute respiratory failure with hypoxia
Acute respiratory failure with hypoxia
Advanced care planning/counseling discussion

## 2019-10-06 NOTE — PROGRESS NOTE ADULT - PROBLEM SELECTOR PLAN 1
Likely EOL delirium, better controlled as patient progresses through the dying process  -c/w Ativan 0.5mg q4h ATC and Ativan 2mg IV q2h PRN

## 2019-10-06 NOTE — PROGRESS NOTE ADULT - PROVIDER SPECIALTY LIST ADULT
Heme/Onc
MICU
Nephrology
Neurology
Neurology
Neurosurgery
Neurosurgery
Palliative Care
Nephrology
Heme/Onc
Heme/Onc
Nephrology

## 2019-10-06 NOTE — PROGRESS NOTE ADULT - PROBLEM SELECTOR PROBLEM 2
Dyspnea, unspecified type
Hyperphosphatemia

## 2019-10-06 NOTE — PROGRESS NOTE ADULT - PROBLEM SELECTOR PROBLEM 3
Acidemia
Tumor lysis syndrome
Acidemia
Acidemia

## 2019-10-06 NOTE — PROGRESS NOTE ADULT - PROBLEM SELECTOR PLAN 2
-start Dilaudid 0.5mg q6h ATC  -increase PRN to Dilaudid 0.5mg q1h PRN -c/w Dilaudid 0.5mg q6h ATC  -c/w Dilaudid 0.5mg q1h PRN, minimal additional PRN use in past 24hrs

## 2019-10-06 NOTE — PROGRESS NOTE ADULT - ATTENDING COMMENTS
The patient was seen and examined  Annotations are embedded above  Predominant symptom(s) Pain/agitation  Relevant factors: None  Recommendations: maintain dosing  Action Items: Monitor for symptom evolution          In the event of newly developing, evolving, or worsening symptoms, please contact the Palliative Medicine team via pager (if the patient is at [facility] #[phone] or if the patient is at [facility] #[phone]) The Geriatric and Palliative Medicine service has coverage 24 hours a day/ 7 days a week to provide medical recommendations regarding symptom management needs        Srinivas Pierson MD   of Geriatric and Palliative Medicine  [facility]  Please contact the following number for clinical matters: [phone]

## 2019-10-06 NOTE — PROGRESS NOTE ADULT - SUBJECTIVE AND OBJECTIVE BOX
GAP TEAM PALLIATIVE CARE UNIT PROGRESS NOTE:      [] Patient on hospice program.    INDICATION FOR PALLIATIVE CARE UNIT SERVICES: Symptom Control, Comfort Measures, GOC    INTERVAL HPI/OVERNIGHT EVENTS: No acute events overnight. Required PRNs of  in 24hrs.    DNR on chart: Yes  Yes    Allergies    Alcohol (Unknown)  No Known Drug Allergies  shellfish (Unknown)    Intolerances    MEDICATIONS  (STANDING):  glycopyrrolate Injectable 0.4 milliGRAM(s) IV Push every 6 hours  HYDROmorphone  Injectable 0.5 milliGRAM(s) IV Push every 6 hours  levETIRAcetam  IVPB 750 milliGRAM(s) IV Intermittent every 12 hours  LORazepam   Injectable 0.5 milliGRAM(s) IV Push every 4 hours    MEDICATIONS  (PRN):  bisacodyl Suppository 10 milliGRAM(s) Rectal daily PRN Constipation  HYDROmorphone  Injectable 0.5 milliGRAM(s) IV Push every 1 hour PRN mod-severe pain  HYDROmorphone  Injectable 0.5 milliGRAM(s) IV Push every 1 hour PRN dyspnea  LORazepam   Injectable 2 milliGRAM(s) IV Push every 2 hours PRN Anxiety/respiratory distress or seizures  ondansetron Injectable 4 milliGRAM(s) IV Push every 6 hours PRN Nausea    ITEMS UNCHECKED ARE NOT PRESENT    PRESENT SYMPTOMS: [x]Unable to obtain due to poor mentation   Source if other than patient:  [x]Family   [x]Team     Pain: [ ] yes [ ] no  QOL impact -   Location -                    Aggravating factors -  Quality -  Radiation -  Timing-  Severity (0-10 scale):  Minimal acceptable level (0-10 scale):     Dyspnea:                           []Mild []Moderate []Severe  Anxiety:                             []Mild []Moderate []Severe  Fatigue:                             []Mild []Moderate []Severe  Nausea:                             []Mild []Moderate []Severe  Loss of appetite:              []Mild []Moderate []Severe  Constipation:                    []Mild []Moderate []Severe    PAINAD Score:    http://geriatrictoolkit.[employer].edu/cog/painad.pdf  		  Other Symptoms:  [x]All other review of systems negative     Karnofsky Performance Score/Palliative Performance Status Version 2:         %  http://npcrc.org/files/news/palliative_performance_scale_ppsv2.pdf    PHYSICAL EXAM:  Vital Signs Last 24 Hrs  T(C): 39 (06 Oct 2019 07:46), Max: 39 (06 Oct 2019 07:46)  T(F): 102.2 (06 Oct 2019 07:46), Max: 102.2 (06 Oct 2019 07:46)  HR: 133 (06 Oct 2019 07:46) (133 - 133)  BP: 79/45 (06 Oct 2019 07:46) (79/45 - 79/45)  BP(mean): --  RR: 26 (06 Oct 2019 07:46) (26 - 26)  SpO2: 85% (06 Oct 2019 07:46) (85% - 85%) I&O's Summary    05 Oct 2019 07:01  -  06 Oct 2019 07:00  --------------------------------------------------------  IN: 100 mL / OUT: 1200 mL / NET: -1100 mL      GENERAL:  []Alert  []Oriented x   []Lethargic  []Cachexia  []Unarousable  []Verbal  []Non-Verbal  Behavioral:   [] Anxiety  [] Delirium [] Agitation [] Other  HEENT:  []Normal   []Dry mouth   []ET Tube/Trach  []Oral lesions  PULMONARY:   [x]Clear []Tachypnea  []Audible excessive secretions   []Rhonchi        []Right []Left []Bilateral  []Crackles        []Right []Left []Bilateral  []Wheezing     []Right []Left []Bilateral  CARDIOVASCULAR:    [x]Regular []Irregular []Tachy  []Umesh []Murmur []Other  GASTROINTESTINAL:  [x]Soft []Distended [x]+BS [x]Non tender []Tender []PEG []OGT/ NGT   Last BM:       GENITOURINARY:  []Normal [x] Incontinent   []Oliguria/Anuria   []Aj  MUSCULOSKELETAL:   []Normal  []Weakness  [x]Bed/Wheelchair bound []Edema  NEUROLOGIC:   []No focal deficits  [x]Cognitive impairment  []Dysphagia []Dysarthria []Paresis []Other   SKIN:   []Normal   []Pressure ulcer(s)  [x]Rash: Please refer to nursing notes for further documentation    CRITICAL CARE:  [ ] Shock Present  [ ]Septic [ ]Cardiogenic [ ]Neurologic [ ]Hypovolemic  [ ]  Vasopressors [ ]  Inotropes   [ ] Respiratory failure present [ ] Mechanical Ventilation [ ] Non-invasive ventilatory support [ ] High-Flow  [ ] Acute  [ ] Chronic [ ] Hypoxic  [ ] Hypercarbic [ ] Other  [ ] Other organ failure     LABS: None new          RADIOLOGY & ADDITIONAL STUDIES: None new    PROTEIN CALORIE MALNUTRITION:   [x] PPSV2 < or = 30% [] significant weight loss [] poor nutritional intake [] anasarca [] catabolic state   Albumin, Serum: 3.5 g/dL (10-03-19 @ 00:00)   Artificial Nutrition []     REFERRALS:   []Chaplaincy  []Hospice  []Child Life  [x]Social Work  []Case management []Holistic Therapy []Physical Therapy []Dietary   Goals of Care Document:   Care Coordination Document: Progress Notes - Care Coordination [C. Provider] (10-03-19 @ 13:53)                                       Progress Notes    PROGRESS NOTE  Date & Time of Note   2019-10-03 13:53    Notes    Notes: Social work continues to follow patient on MICU. Per chart, decision  made by patient's two daughters to pursue comfort care measures only. HD  discontinued. LCSW attempted visit, however no family present at bedside and  patient no longer able to participate in interview. No concerns identified at  this time. Daughters remain surrogate decision makers. Palliative consulted.  Social work to continue to remain available as needed.       Electronically signed by:  Kelsi Ga  Electronically signed on:  2019-10-03  13:53 GAP TEAM PALLIATIVE CARE UNIT PROGRESS NOTE:      [] Patient on hospice program.    INDICATION FOR PALLIATIVE CARE UNIT SERVICES: Symptom Control, Comfort Measures    INTERVAL HPI/OVERNIGHT EVENTS: febrile this AM. Noted to have bloody oral secretions when oral care is performed. Appears more comfortable with ATC Dilaudid dosing. Required PRNs of Dilaudid x1 in 24hrs.    DNR on chart: Yes  Yes    Allergies    Alcohol (Unknown)  No Known Drug Allergies  shellfish (Unknown)    Intolerances    MEDICATIONS  (STANDING):  glycopyrrolate Injectable 0.4 milliGRAM(s) IV Push every 6 hours  HYDROmorphone  Injectable 0.5 milliGRAM(s) IV Push every 6 hours  levETIRAcetam  IVPB 750 milliGRAM(s) IV Intermittent every 12 hours  LORazepam   Injectable 0.5 milliGRAM(s) IV Push every 4 hours    MEDICATIONS  (PRN):  bisacodyl Suppository 10 milliGRAM(s) Rectal daily PRN Constipation  HYDROmorphone  Injectable 0.5 milliGRAM(s) IV Push every 1 hour PRN mod-severe pain  HYDROmorphone  Injectable 0.5 milliGRAM(s) IV Push every 1 hour PRN dyspnea  LORazepam   Injectable 2 milliGRAM(s) IV Push every 2 hours PRN Anxiety/respiratory distress or seizures  ondansetron Injectable 4 milliGRAM(s) IV Push every 6 hours PRN Nausea    ITEMS UNCHECKED ARE NOT PRESENT    PRESENT SYMPTOMS: [x]Unable to obtain due to poor mentation   Source if other than patient:  []Family   [x]Team     Pain: [ ] yes [ ] no  QOL impact -   Location -                    Aggravating factors -  Quality -  Radiation -  Timing-  Severity (0-10 scale):  Minimal acceptable level (0-10 scale):     Dyspnea:                           []Mild [x]Moderate []Severe  Anxiety:                             []Mild []Moderate []Severe  Fatigue:                             []Mild []Moderate []Severe  Nausea:                             []Mild []Moderate []Severe  Loss of appetite:              []Mild []Moderate []Severe  Constipation:                    []Mild []Moderate []Severe    PAINAD Score:    http://geriatrictoolkit.[place].[facility]/cog/painad.pdf  		  Other Symptoms:  [x]All other review of systems negative     Karnofsky Performance Score/Palliative Performance Status Version 2:        10%  http://npcrc.org/files/news/palliative_performance_scale_ppsv2.pdf    PHYSICAL EXAM:  Vital Signs Last 24 Hrs  T(C): 39 (06 Oct 2019 07:46), Max: 39 (06 Oct 2019 07:46)  T(F): 102.2 (06 Oct 2019 07:46), Max: 102.2 (06 Oct 2019 07:46)  HR: 133 (06 Oct 2019 07:46) (133 - 133)  BP: 79/45 (06 Oct 2019 07:46) (79/45 - 79/45)  BP(mean): --  RR: 26 (06 Oct 2019 07:46) (26 - 26)  SpO2: 85% (06 Oct 2019 07:46) (85% - 85%) I&O's Summary    05 Oct 2019 07:01  -  06 Oct 2019 07:00  --------------------------------------------------------  IN: 100 mL / OUT: 1200 mL / NET: -1100 mL      GENERAL:  []Alert  []Oriented x   [x]Lethargic  []Cachexia  []Unarousable  []Verbal  [x]Non-Verbal  Behavioral:   [] Anxiety  [x] Delirium [] Agitation [] Other  HEENT:  []Normal   [x]Dry mouth   []ET Tube/Trach  []Oral lesions  PULMONARY:   [x]Clear []Tachypnea  []Audible excessive secretions   []Rhonchi        []Right []Left []Bilateral  []Crackles        []Right []Left []Bilateral  []Wheezing     []Right []Left []Bilateral  CARDIOVASCULAR:    [x]Regular []Irregular []Tachy  []Umesh []Murmur []Other  GASTROINTESTINAL:  [x]Soft []Distended [x]+BS [x]Non tender []Tender []PEG []OGT/ NGT   Last BM:  9/26    GENITOURINARY:  []Normal [] Incontinent   []Oliguria/Anuria   [x]Aj  MUSCULOSKELETAL:   []Normal  [x]Weakness  [x]Bed/Wheelchair bound []Edema  NEUROLOGIC:   []No focal deficits  [x]Cognitive impairment  []Dysphagia []Dysarthria []Paresis []Other   SKIN:   []Normal   []Pressure ulcer(s)  [x]Rash: Please refer to nursing notes for further documentation    CRITICAL CARE:  [ ] Shock Present  [ ]Septic [ ]Cardiogenic [ ]Neurologic [ ]Hypovolemic  [ ]  Vasopressors [ ]  Inotropes   [ ] Respiratory failure present [ ] Mechanical Ventilation [ ] Non-invasive ventilatory support [ ] High-Flow  [ ] Acute  [ ] Chronic [ ] Hypoxic  [ ] Hypercarbic [ ] Other  [x] Other organ failure   LABS: None new          RADIOLOGY & ADDITIONAL STUDIES: None new    PROTEIN CALORIE MALNUTRITION:   [x] PPSV2 < or = 30% [] significant weight loss [x] poor nutritional intake [] anasarca [] catabolic state   Albumin, Serum: 3.5 g/dL (10-03-19 @ 00:00)   Artificial Nutrition []     REFERRALS:   []Chaplaincy  []Hospice  []Child Life  [x]Social Work  []Case management []Holistic Therapy []Physical Therapy []Dietary   Goals of Care Document:   Care Coordination Document: Progress Notes - Care Coordination [C. Provider] (10-03-19 @ 13:53)                                       Progress Notes    PROGRESS NOTE  Date & Time of Note   2019-10-03 13:53    Notes    Notes: Social work continues to follow patient on MICU. Per chart, decision  made by patient's two daughters to pursue comfort care measures only. HD  discontinued. LCSW attempted visit, however no family present at bedside and  patient no longer able to participate in interview. No concerns identified at  this time. Daughters remain surrogate decision makers. Palliative consulted.  Social work to continue to remain available as needed.       Electronically signed by:  Kelsi Ga  Electronically signed on:  2019-10-03  13:53

## 2019-10-06 NOTE — PROGRESS NOTE ADULT - PROBLEM SELECTOR PROBLEM 1
ADITHYA (acute kidney injury)
Agitation
ADITHYA (acute kidney injury)
ADITHYA (acute kidney injury)

## 2019-10-06 NOTE — PROGRESS NOTE ADULT - REASON FOR ADMISSION
syncope with b/l SDH
b/l SDH
syncope with b/l SDH
syncope with bilateral SDH; history of polycythemia vera RICA 2 positive
syncope with b/l SDH

## 2019-10-06 NOTE — PROGRESS NOTE ADULT - PROBLEM SELECTOR PROBLEM 4
Hypocalcemia
Subdural hematoma
Hypocalcemia
Hypocalcemia

## 2019-10-06 NOTE — PROGRESS NOTE ADULT - PROBLEM SELECTOR PLAN 6
Updates provided to family, emotional support provided. Continue with supportive care as patient transitions through the EOL. Updates provided to family, emotional support provided. Continue with supportive care as patient transitions through the EOL. Active dying

## 2019-10-06 NOTE — DISCHARGE NOTE FOR THE EXPIRED PATIENT - HOSPITAL COURSE
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 Upon arrival to the ED, pt's mental status was waxing and waning, baseline AAOx4. ED course was c/b T 101, hypoxemia to 83%, tachycardia to 122, tachypnea to 30s and temporarily placed on bipap.     While in the MICU, pt was started on BIPAP for respiratory distress and CVVHD 19 for tumor lysis syndrome. TLS labs were followed q6 hours with improvement. CVVHD was discontinued on 10/1/19, and pt was transitioned to NC. A few hours after discontinuation of CRRT, pt went into flash pulmonary edema and received HD, during which 2L were removed. During this time, pt's mental status declined A&Ox0, and pt was also placed back on BiPAP.     Pt had 1 episode of seizure in the MICU, and was started on Keppra BID. vEEG the following day showed no seizures. Neuro recommended continuing Keppra BID.    Pt received multiple units of PRBCs and platelets. Bone marrow biopsy and aspiration was performed and showed hypercellular marrow with myeloproliferation and myelodysplastic change. Peripheral blood flow cytometry : 5 % blasts. Heme/onc discussed with family that pt was not a candidate for hydroxyurea or chemo. Pt's 2 daughters spoke to the palliative team and agreed that at this time, their mother would be best served by comfort care.    Patient was transferred to PCU for ongoing symptom management. She  comfortably at 14:10 on 10/6/19 with family at bedside.

## 2019-10-06 NOTE — PROGRESS NOTE ADULT - ASSESSMENT
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 Course c/b seizures, encephalopathy, renal failure. Palliative consulted, goals are now comfort-oriented, and patient was transferred to PCU for symptom management.

## 2019-10-09 LAB — CHROM ANALY OVERALL INTERP SPEC-IMP: SIGNIFICANT CHANGE UP

## 2019-11-12 PROCEDURE — 84100 ASSAY OF PHOSPHORUS: CPT

## 2019-11-12 PROCEDURE — 88342 IMHCHEM/IMCYTCHM 1ST ANTB: CPT

## 2019-11-12 PROCEDURE — 88360 TUMOR IMMUNOHISTOCHEM/MANUAL: CPT

## 2019-11-12 PROCEDURE — 87040 BLOOD CULTURE FOR BACTERIA: CPT

## 2019-11-12 PROCEDURE — 88285 CHROMOSOME COUNT ADDITIONAL: CPT

## 2019-11-12 PROCEDURE — 94660 CPAP INITIATION&MGMT: CPT

## 2019-11-12 PROCEDURE — 84550 ASSAY OF BLOOD/URIC ACID: CPT

## 2019-11-12 PROCEDURE — 86900 BLOOD TYPING SEROLOGIC ABO: CPT

## 2019-11-12 PROCEDURE — 36430 TRANSFUSION BLD/BLD COMPNT: CPT

## 2019-11-12 PROCEDURE — 93005 ELECTROCARDIOGRAM TRACING: CPT | Mod: XU

## 2019-11-12 PROCEDURE — 87486 CHLMYD PNEUM DNA AMP PROBE: CPT

## 2019-11-12 PROCEDURE — 83605 ASSAY OF LACTIC ACID: CPT

## 2019-11-12 PROCEDURE — 95816 EEG AWAKE AND DROWSY: CPT

## 2019-11-12 PROCEDURE — 88184 FLOWCYTOMETRY/ TC 1 MARKER: CPT

## 2019-11-12 PROCEDURE — P9037: CPT

## 2019-11-12 PROCEDURE — 96375 TX/PRO/DX INJ NEW DRUG ADDON: CPT | Mod: XU

## 2019-11-12 PROCEDURE — 51702 INSERT TEMP BLADDER CATH: CPT

## 2019-11-12 PROCEDURE — 86923 COMPATIBILITY TEST ELECTRIC: CPT

## 2019-11-12 PROCEDURE — 85027 COMPLETE CBC AUTOMATED: CPT

## 2019-11-12 PROCEDURE — 80053 COMPREHEN METABOLIC PANEL: CPT

## 2019-11-12 PROCEDURE — 88280 CHROMOSOME KARYOTYPE STUDY: CPT

## 2019-11-12 PROCEDURE — 86901 BLOOD TYPING SEROLOGIC RH(D): CPT

## 2019-11-12 PROCEDURE — 86803 HEPATITIS C AB TEST: CPT

## 2019-11-12 PROCEDURE — 88237 TISSUE CULTURE BONE MARROW: CPT

## 2019-11-12 PROCEDURE — 84295 ASSAY OF SERUM SODIUM: CPT

## 2019-11-12 PROCEDURE — 88341 IMHCHEM/IMCYTCHM EA ADD ANTB: CPT

## 2019-11-12 PROCEDURE — 84484 ASSAY OF TROPONIN QUANT: CPT

## 2019-11-12 PROCEDURE — 88305 TISSUE EXAM BY PATHOLOGIST: CPT

## 2019-11-12 PROCEDURE — 85379 FIBRIN DEGRADATION QUANT: CPT

## 2019-11-12 PROCEDURE — 85014 HEMATOCRIT: CPT

## 2019-11-12 PROCEDURE — 81206 BCR/ABL1 GENE MAJOR BP: CPT

## 2019-11-12 PROCEDURE — 87798 DETECT AGENT NOS DNA AMP: CPT

## 2019-11-12 PROCEDURE — 87086 URINE CULTURE/COLONY COUNT: CPT

## 2019-11-12 PROCEDURE — P9059: CPT

## 2019-11-12 PROCEDURE — 81001 URINALYSIS AUTO W/SCOPE: CPT

## 2019-11-12 PROCEDURE — 82962 GLUCOSE BLOOD TEST: CPT

## 2019-11-12 PROCEDURE — 99291 CRITICAL CARE FIRST HOUR: CPT | Mod: 25

## 2019-11-12 PROCEDURE — 82330 ASSAY OF CALCIUM: CPT

## 2019-11-12 PROCEDURE — 80202 ASSAY OF VANCOMYCIN: CPT

## 2019-11-12 PROCEDURE — 82803 BLOOD GASES ANY COMBINATION: CPT

## 2019-11-12 PROCEDURE — 92610 EVALUATE SWALLOWING FUNCTION: CPT

## 2019-11-12 PROCEDURE — 95951: CPT

## 2019-11-12 PROCEDURE — 82435 ASSAY OF BLOOD CHLORIDE: CPT

## 2019-11-12 PROCEDURE — 81207 BCR/ABL1 GENE MINOR BP: CPT

## 2019-11-12 PROCEDURE — 93306 TTE W/DOPPLER COMPLETE: CPT

## 2019-11-12 PROCEDURE — P9040: CPT

## 2019-11-12 PROCEDURE — 87633 RESP VIRUS 12-25 TARGETS: CPT

## 2019-11-12 PROCEDURE — 83010 ASSAY OF HAPTOGLOBIN QUANT: CPT

## 2019-11-12 PROCEDURE — 74176 CT ABD & PELVIS W/O CONTRAST: CPT

## 2019-11-12 PROCEDURE — 81270 JAK2 GENE: CPT

## 2019-11-12 PROCEDURE — 83615 LACTATE (LD) (LDH) ENZYME: CPT

## 2019-11-12 PROCEDURE — 71250 CT THORAX DX C-: CPT

## 2019-11-12 PROCEDURE — 96374 THER/PROPH/DIAG INJ IV PUSH: CPT | Mod: XU

## 2019-11-12 PROCEDURE — 87581 M.PNEUMON DNA AMP PROBE: CPT

## 2019-11-12 PROCEDURE — 86706 HEP B SURFACE ANTIBODY: CPT

## 2019-11-12 PROCEDURE — 85610 PROTHROMBIN TIME: CPT

## 2019-11-12 PROCEDURE — 87186 SC STD MICRODIL/AGAR DIL: CPT

## 2019-11-12 PROCEDURE — 97166 OT EVAL MOD COMPLEX 45 MIN: CPT

## 2019-11-12 PROCEDURE — 86703 HIV-1/HIV-2 1 RESULT ANTBDY: CPT

## 2019-11-12 PROCEDURE — 85097 BONE MARROW INTERPRETATION: CPT

## 2019-11-12 PROCEDURE — 94640 AIRWAY INHALATION TREATMENT: CPT

## 2019-11-12 PROCEDURE — 87449 NOS EACH ORGANISM AG IA: CPT

## 2019-11-12 PROCEDURE — 85384 FIBRINOGEN ACTIVITY: CPT

## 2019-11-12 PROCEDURE — 83735 ASSAY OF MAGNESIUM: CPT

## 2019-11-12 PROCEDURE — C8929: CPT

## 2019-11-12 PROCEDURE — 87340 HEPATITIS B SURFACE AG IA: CPT

## 2019-11-12 PROCEDURE — 83880 ASSAY OF NATRIURETIC PEPTIDE: CPT

## 2019-11-12 PROCEDURE — 70450 CT HEAD/BRAIN W/O DYE: CPT

## 2019-11-12 PROCEDURE — 87205 SMEAR GRAM STAIN: CPT

## 2019-11-12 PROCEDURE — 88185 FLOWCYTOMETRY/TC ADD-ON: CPT

## 2019-11-12 PROCEDURE — 86850 RBC ANTIBODY SCREEN: CPT

## 2019-11-12 PROCEDURE — 82947 ASSAY GLUCOSE BLOOD QUANT: CPT

## 2019-11-12 PROCEDURE — 84132 ASSAY OF SERUM POTASSIUM: CPT

## 2019-11-12 PROCEDURE — 71045 X-RAY EXAM CHEST 1 VIEW: CPT

## 2019-11-12 PROCEDURE — 85730 THROMBOPLASTIN TIME PARTIAL: CPT

## 2019-11-12 PROCEDURE — 88313 SPECIAL STAINS GROUP 2: CPT

## 2019-11-12 PROCEDURE — 88264 CHROMOSOME ANALYSIS 20-25: CPT

## 2019-12-15 NOTE — PROGRESS NOTE ADULT - PROBLEM/PLAN-2
DISPLAY PLAN FREE TEXT
No

## 2020-03-10 NOTE — OCCUPATIONAL THERAPY INITIAL EVALUATION ADULT - PHYSICAL ASSIST/NONPHYSICAL ASSIST:DRESS LOWER BODY, OT EVAL
Patient had relief of symptoms after having a bowel movement this morning.  Denies any nausea, vomiting.  Had a watery bowel movement.  Stool studies, C diff ordered.  No surgical intervention at this time per surgery.  No evidence of obstruction or ileus.  Patient was seen by admitting provider this morning.    General: Patient resting comfortably in no acute distress. Appears as stated age. Calm  Eyes: EOM intact. No conjunctivae injection. No scleral icterus.  ENT: Hearing grossly intact. No discharge from ears. No nasal discharge.   CVS: RRR. No LE edema BL.  Lungs: CTA BL, no wheezing or crackles. Good breath sounds. No accessory muscle use. No acute respiratory distress  Ab: soft, NTND, +BS  Neuro: AOx3. GCS 15. Cranial nerves grossly intact. Moves all extremities equally. Follows commands. Responds appropriately    1 person assist/nonverbal cues (demo/gestures)/verbal cues

## 2021-09-29 NOTE — ED PROVIDER NOTE - ATTESTATION, MLM
Fall Prevention   AMBULATORY CARE:   Fall prevention  includes ways to make your home and other areas safer  It also includes ways you can move more carefully to prevent a fall  Health conditions that cause changes in your blood pressure, vision, or muscle strength and coordination may increase your risk for falls  Medicines may also increase your risk for falls if they make you dizzy, weak, or sleepy  Call 911 or have someone else call if:   · You have fallen and are unconscious  · You have fallen and cannot move part of your body  Contact your healthcare provider if:   · You have fallen and have pain or a headache  · You have questions or concerns about your condition or care  Fall prevention tips:   · Stand or sit up slowly  This may help you keep your balance and prevent falls  · Use assistive devices as directed  Your healthcare provider may suggest that you use a cane or walker to help you keep your balance  You may need to have grab bars put in your bathroom near the toilet or in the shower  · Wear shoes that fit well and have soles that   Wear shoes both inside and outside  Use slippers with good   Do not wear shoes with high heels  · Wear a personal alarm  This is a device that allows you to call 911 if you fall and need help  Ask your healthcare provider for more information  · Stay active  Exercise can help strengthen your muscles and improve your balance  Your healthcare provider may recommend water aerobics or walking  He or she may also recommend physical therapy to improve your coordination  Never start an exercise program without talking to your healthcare provider first          · Manage your medical conditions  Keep all appointments with your healthcare providers  Visit your eye doctor as directed  Home safety tips:       · Add items to prevent falls in the bathroom  Put nonslip strips on your bath or shower floor to prevent you from slipping   Use a bath mat if you do not have carpet in the bathroom  This will prevent you from falling when you step out of the bath or shower  Use a shower seat so you do not need to stand while you shower  Sit on the toilet or a chair in your bathroom to dry yourself and put on clothing  This will prevent you from losing your balance from drying or dressing yourself while you are standing  · Keep paths clear  Remove books, shoes, and other objects from walkways and stairs  Place cords for telephones and lamps out of the way so that you do not need to walk over them  Tape them down if you cannot move them  Remove small rugs  If you cannot remove a rug, secure it with double-sided tape  This will prevent you from tripping  · Install bright lights in your home  Use night lights to help light paths to the bathroom or kitchen  Always turn on the light before you start walking  · Keep items you use often on shelves within reach  Do not use a step stool to help you reach an item  · Paint or place reflective tape on the edges of your stairs  This will help you see the stairs better  Follow up with your healthcare provider as directed:  Write down your questions so you remember to ask them during your visits  © Copyright [employer] 2021 Information is for End User's use only and may not be sold, redistributed or otherwise used for commercial purposes  All illustrations and images included in CareNotes® are the copyrighted property of A D A M [employer]  or Vandana Meza  The above information is an  only  It is not intended as medical advice for individual conditions or treatments  Talk to your doctor, nurse or pharmacist before following any medical regimen to see if it is safe and effective for you  I have reviewed and confirmed nurses' notes for patient's medications, allergies, medical history, and surgical history.

## 2022-06-20 NOTE — DIETITIAN INITIAL EVALUATION ADULT. - PROBLEM SELECTOR PLAN 1
Repeat CT showed stable appearance of right > left holohemispheric subdural hematomas with minimal mass effect and minimal leftward midline shift (0.3 cm). Small falcine subdural hematoma unchanged. No acute infarcts. No depressed skull fracture.  - s/p 1U platelet and DDAVP given uremia. HOlding aspirin.   - neurosurgery recs appreciated; no plan for surgical intervention  - repeat CT in 24 hours  - monitor neuro checks  - monitor CBC, platelet goal >100  - neuro recs appreciated; will defer to day team to discuss with renal garding CTA of head and neck if Scr improved. Also can consider MRI without Baldomero.
4 = No assist / stand by assistance

## 2023-02-10 NOTE — ED PROVIDER NOTE - CONSULTANT FREE TEXT FOR MDM DISCUSSED CASE WITH QUESTION
Neuro=Bhachuwat Tazorac Counseling:  Patient advised that medication is irritating and drying.  Patient may need to apply sparingly and wash off after an hour before eventually leaving it on overnight.  The patient verbalized understanding of the proper use and possible adverse effects of tazorac.  All of the patient's questions and concerns were addressed.

## 2023-05-20 NOTE — ED PROVIDER NOTE - DATE/TIME 6
XR: Findings concerning for medial tibial plateau fracture. Recommending noncontrast CT of knee for confirmation. 26-Sep-2019 23:40
